# Patient Record
Sex: FEMALE | Race: WHITE | Employment: OTHER | URBAN - METROPOLITAN AREA
[De-identification: names, ages, dates, MRNs, and addresses within clinical notes are randomized per-mention and may not be internally consistent; named-entity substitution may affect disease eponyms.]

---

## 2017-01-05 ENCOUNTER — OFFICE VISIT (OUTPATIENT)
Dept: FAMILY MEDICINE CLINIC | Age: 72
End: 2017-01-05

## 2017-01-05 VITALS
HEIGHT: 60 IN | HEART RATE: 63 BPM | DIASTOLIC BLOOD PRESSURE: 82 MMHG | OXYGEN SATURATION: 90 % | SYSTOLIC BLOOD PRESSURE: 118 MMHG | WEIGHT: 170 LBS | BODY MASS INDEX: 33.38 KG/M2 | TEMPERATURE: 97.8 F

## 2017-01-05 DIAGNOSIS — I10 ESSENTIAL HYPERTENSION: Primary | ICD-10-CM

## 2017-01-05 DIAGNOSIS — M35.00 SJOGREN'S DISEASE (HCC): ICD-10-CM

## 2017-01-05 DIAGNOSIS — J06.9 UPPER RESPIRATORY TRACT INFECTION, UNSPECIFIED TYPE: ICD-10-CM

## 2017-01-05 DIAGNOSIS — B37.31 YEAST VAGINITIS: ICD-10-CM

## 2017-01-05 DIAGNOSIS — E55.9 VITAMIN D DEFICIENCY: ICD-10-CM

## 2017-01-05 DIAGNOSIS — E78.2 MIXED HYPERLIPIDEMIA: ICD-10-CM

## 2017-01-05 DIAGNOSIS — M17.12 PRIMARY OSTEOARTHRITIS OF LEFT KNEE: ICD-10-CM

## 2017-01-05 DIAGNOSIS — K21.9 GASTROESOPHAGEAL REFLUX DISEASE WITHOUT ESOPHAGITIS: ICD-10-CM

## 2017-01-05 PROCEDURE — 99214 OFFICE O/P EST MOD 30 MIN: CPT | Performed by: FAMILY MEDICINE

## 2017-01-05 RX ORDER — PANTOPRAZOLE SODIUM 40 MG/1
TABLET, DELAYED RELEASE ORAL
COMMUNITY
End: 2017-01-05 | Stop reason: SDUPTHER

## 2017-01-05 RX ORDER — FLUCONAZOLE 150 MG/1
TABLET ORAL
Qty: 2 TABLET | Refills: 1 | Status: SHIPPED | OUTPATIENT
Start: 2017-01-05 | End: 2017-10-26

## 2017-01-05 RX ORDER — AZITHROMYCIN 250 MG/1
TABLET, FILM COATED ORAL
Qty: 1 PACKET | Refills: 0 | Status: SHIPPED | OUTPATIENT
Start: 2017-01-05 | End: 2017-01-15

## 2017-01-05 RX ORDER — PANTOPRAZOLE SODIUM 40 MG/1
40 TABLET, DELAYED RELEASE ORAL DAILY
Qty: 90 TABLET | Refills: 1 | Status: SHIPPED | OUTPATIENT
Start: 2017-01-05 | End: 2019-11-15 | Stop reason: ALTCHOICE

## 2017-01-05 RX ORDER — ATORVASTATIN CALCIUM 10 MG/1
10 TABLET, FILM COATED ORAL NIGHTLY
Qty: 90 TABLET | Refills: 1 | Status: SHIPPED | OUTPATIENT
Start: 2017-01-05

## 2017-01-05 RX ORDER — LISINOPRIL AND HYDROCHLOROTHIAZIDE 20; 12.5 MG/1; MG/1
1 TABLET ORAL DAILY
Qty: 90 TABLET | Refills: 1 | Status: SHIPPED | OUTPATIENT
Start: 2017-01-05

## 2017-01-05 RX ORDER — CELECOXIB 200 MG/1
200 CAPSULE ORAL 2 TIMES DAILY
Qty: 180 CAPSULE | Refills: 1 | Status: SHIPPED | OUTPATIENT
Start: 2017-01-05 | End: 2018-11-14 | Stop reason: ALTCHOICE

## 2017-01-05 ASSESSMENT — ENCOUNTER SYMPTOMS
SORE THROAT: 1
GASTROINTESTINAL NEGATIVE: 1
COUGH: 1

## 2017-01-10 ENCOUNTER — HOSPITAL ENCOUNTER (OUTPATIENT)
Dept: LAB | Age: 72
Discharge: HOME | End: 2017-01-10
Attending: FAMILY MEDICINE | Admitting: FAMILY MEDICINE

## 2017-01-10 LAB
ALBUMIN SERPL-MCNC: 4 GM/DL (ref 3.4–5)
ALP BLD-CCNC: 73 IU/L (ref 40–129)
ALT SERPL-CCNC: 10 U/L (ref 10–40)
ANION GAP SERPL CALCULATED.3IONS-SCNC: 6 MMOL/L (ref 4–16)
AST SERPL-CCNC: 17 IU/L (ref 15–37)
BASOPHILS ABSOLUTE: 0 K/CU MM
BASOPHILS RELATIVE PERCENT: 1 % (ref 0–1)
BILIRUB SERPL-MCNC: 0.5 MG/DL (ref 0–1)
BUN BLDV-MCNC: 15 MG/DL (ref 6–23)
CALCIUM SERPL-MCNC: 9.5 MG/DL (ref 8.3–10.6)
CHLORIDE BLD-SCNC: 97 MMOL/L (ref 99–110)
CHOLESTEROL: 106 MG/DL
CO2: 33 MMOL/L (ref 21–32)
CREAT SERPL-MCNC: 0.7 MG/DL (ref 0.6–1.1)
DIFFERENTIAL TYPE: ABNORMAL
EOSINOPHILS ABSOLUTE: 0 K/CU MM
EOSINOPHILS RELATIVE PERCENT: 0 % (ref 0–3)
GFR AFRICAN AMERICAN: >60 ML/MIN/1.73M2
GFR NON-AFRICAN AMERICAN: >60 ML/MIN/1.73M2
GLUCOSE FASTING: 89 MG/DL (ref 70–99)
HCT VFR BLD CALC: 38.3 % (ref 37–47)
HDLC SERPL-MCNC: 47 MG/DL
HEMOGLOBIN: 12.5 GM/DL (ref 12.5–16)
IMMATURE NEUTROPHIL %: 0 % (ref 0–0.43)
LDL CHOLESTEROL DIRECT: 53 MG/DL
LYMPHOCYTES ABSOLUTE: 1.2 K/CU MM
LYMPHOCYTES RELATIVE PERCENT: 30.8 % (ref 24–44)
MCH RBC QN AUTO: 28.3 PG (ref 27–31)
MCHC RBC AUTO-ENTMCNC: 32.6 % (ref 32–36)
MCV RBC AUTO: 86.8 FL (ref 78–100)
MONOCYTES ABSOLUTE: 0.5 K/CU MM
MONOCYTES RELATIVE PERCENT: 11.6 % (ref 0–4)
PDW BLD-RTO: 14.4 % (ref 11.7–14.9)
PLATELET # BLD: 291 K/CU MM (ref 140–440)
PMV BLD AUTO: 8.7 FL (ref 7.5–11.1)
POTASSIUM SERPL-SCNC: 4 MMOL/L (ref 3.5–5.1)
RBC # BLD: 4.41 M/CU MM (ref 4.2–5.4)
SEGMENTED NEUTROPHILS ABSOLUTE COUNT: 2.2 K/CU MM
SEGMENTED NEUTROPHILS RELATIVE PERCENT: 56.6 % (ref 36–66)
SODIUM BLD-SCNC: 136 MMOL/L (ref 135–145)
TOTAL IMMATURE NEUTOROPHIL: 0 K/CU MM
TOTAL PROTEIN: 7.1 GM/DL (ref 6.4–8.2)
TRIGL SERPL-MCNC: 75 MG/DL
VITAMIN D 25-HYDROXY: 44.39 NG/ML
WBC # BLD: 4 K/CU MM (ref 4–10.5)

## 2017-03-31 ENCOUNTER — HOSPITAL ENCOUNTER (OUTPATIENT)
Dept: LAB | Age: 72
Discharge: HOME | End: 2017-03-31
Attending: INTERNAL MEDICINE | Admitting: INTERNAL MEDICINE

## 2017-03-31 LAB
ALBUMIN SERPL-MCNC: 3.8 GM/DL (ref 3.4–5)
ALP BLD-CCNC: 66 IU/L (ref 40–129)
ALT SERPL-CCNC: 11 U/L (ref 10–40)
ANION GAP SERPL CALCULATED.3IONS-SCNC: 7 MMOL/L (ref 4–16)
AST SERPL-CCNC: 19 IU/L (ref 15–37)
BASOPHILS ABSOLUTE: 0 K/CU MM
BASOPHILS RELATIVE PERCENT: 1 % (ref 0–1)
BILIRUB SERPL-MCNC: 0.7 MG/DL (ref 0–1)
BUN BLDV-MCNC: 13 MG/DL (ref 6–23)
CALCIUM SERPL-MCNC: 9.4 MG/DL (ref 8.3–10.6)
CHLORIDE BLD-SCNC: 101 MMOL/L (ref 99–110)
CO2: 31 MMOL/L (ref 21–32)
CREAT SERPL-MCNC: 0.7 MG/DL (ref 0.6–1.1)
DIFFERENTIAL TYPE: ABNORMAL
EOSINOPHILS ABSOLUTE: 0 K/CU MM
EOSINOPHILS RELATIVE PERCENT: 0 % (ref 0–3)
FERRITIN: 24 NG/ML (ref 15–150)
FOLATE: >20 NG/ML (ref 3.1–17.5)
GFR AFRICAN AMERICAN: >60 ML/MIN/1.73M2
GFR NON-AFRICAN AMERICAN: >60 ML/MIN/1.73M2
GLUCOSE BLD-MCNC: 89 MG/DL (ref 70–140)
HCT VFR BLD CALC: 36.2 % (ref 37–47)
HEMOGLOBIN: 11.9 GM/DL (ref 12.5–16)
IGA: 203 MG/DL (ref 69–382)
IGG,SERUM: 921 MG/DL (ref 723–1685)
IGM,SERUM: 100 MG/DL (ref 62–277)
IMMATURE NEUTROPHIL %: 0.3 % (ref 0–0.43)
IRON: 75 UG/DL (ref 37–145)
LACTATE DEHYDROGENASE: 176 IU/L (ref 120–246)
LYMPHOCYTES ABSOLUTE: 1.5 K/CU MM
LYMPHOCYTES RELATIVE PERCENT: 37.3 % (ref 24–44)
MCH RBC QN AUTO: 29.5 PG (ref 27–31)
MCHC RBC AUTO-ENTMCNC: 32.9 % (ref 32–36)
MCV RBC AUTO: 89.8 FL (ref 78–100)
MONOCYTES ABSOLUTE: 0.4 K/CU MM
MONOCYTES RELATIVE PERCENT: 10.2 % (ref 0–4)
PCT TRANSFERRIN: 26 % (ref 10–44)
PDW BLD-RTO: 13.1 % (ref 11.7–14.9)
PLATELET # BLD: 227 K/CU MM (ref 140–440)
PMV BLD AUTO: 10.1 FL (ref 7.5–11.1)
POTASSIUM SERPL-SCNC: 3.8 MMOL/L (ref 3.5–5.1)
RBC # BLD: 4.03 M/CU MM (ref 4.2–5.4)
RETICULOCYTE COUNT PCT: 1.6 % (ref 0.2–2.2)
SEGMENTED NEUTROPHILS ABSOLUTE COUNT: 2 K/CU MM
SEGMENTED NEUTROPHILS RELATIVE PERCENT: 51.2 % (ref 36–66)
SODIUM BLD-SCNC: 139 MMOL/L (ref 135–145)
TOTAL IMMATURE NEUTOROPHIL: 0.01 K/CU MM
TOTAL IRON BINDING CAPACITY: 290 UG/DL (ref 250–450)
TOTAL PROTEIN: 6.8 GM/DL (ref 6.4–8.2)
UNSATURATED IRON BINDING CAPACITY: 215 UG/DL (ref 110–370)
VITAMIN B-12: 378.6 PG/ML (ref 211–911)
WBC # BLD: 3.9 K/CU MM (ref 4–10.5)

## 2017-04-03 LAB
ALBUMIN ELP: 3.6 GM/DL (ref 3.2–5.6)
ALPHA-1-GLOBULIN: 0.2 GM/DL (ref 0.1–0.4)
ALPHA-2-GLOBULIN: 0.8 GM/DL (ref 0.4–1.2)
BETA GLOBULIN: 1 GM/DL (ref 0.5–1.3)
GAMMA GLOBULIN: 1.2 GM/DL (ref 0.5–1.6)
IMMUNOFIXATION ELECTROPHORESIS 1: NORMAL
TOTAL PROTEIN: 6.8 GM/DL (ref 6.4–8.2)

## 2017-05-11 ENCOUNTER — HOSPITAL ENCOUNTER (OUTPATIENT)
Dept: OTHER | Age: 72
Discharge: HOME | End: 2017-05-11
Attending: INTERNAL MEDICINE | Admitting: INTERNAL MEDICINE

## 2017-05-11 LAB
HEMOCCULT SP1 STL QL: NEGATIVE
OCCULT BLOOD 2: NEGATIVE
OCCULT BLOOD 3: NEGATIVE

## 2017-06-13 ENCOUNTER — TELEPHONE (OUTPATIENT)
Dept: PHYSICAL MEDICINE AND REHAB | Age: 72
End: 2017-06-13

## 2017-06-14 ENCOUNTER — HOSPITAL ENCOUNTER (OUTPATIENT)
Dept: LAB | Age: 72
Discharge: HOME | End: 2017-06-14
Attending: INTERNAL MEDICINE | Admitting: INTERNAL MEDICINE

## 2017-06-14 ENCOUNTER — OFFICE VISIT (OUTPATIENT)
Dept: PHYSICAL MEDICINE AND REHAB | Age: 72
End: 2017-06-14

## 2017-06-14 DIAGNOSIS — M79.601 PARESTHESIA AND PAIN OF BOTH UPPER EXTREMITIES: ICD-10-CM

## 2017-06-14 DIAGNOSIS — M79.602 PARESTHESIA AND PAIN OF BOTH UPPER EXTREMITIES: ICD-10-CM

## 2017-06-14 DIAGNOSIS — G56.03 BILATERAL CARPAL TUNNEL SYNDROME: Primary | ICD-10-CM

## 2017-06-14 DIAGNOSIS — G56.22 ULNAR NEUROPATHY AT WRIST, LEFT: ICD-10-CM

## 2017-06-14 DIAGNOSIS — R20.2 PARESTHESIA AND PAIN OF BOTH UPPER EXTREMITIES: ICD-10-CM

## 2017-06-14 LAB
ALBUMIN SERPL-MCNC: 3.9 GM/DL (ref 3.4–5)
ALP BLD-CCNC: 65 IU/L (ref 40–129)
ALT SERPL-CCNC: 10 U/L (ref 10–40)
ANION GAP SERPL CALCULATED.3IONS-SCNC: 8 MMOL/L (ref 4–16)
AST SERPL-CCNC: 17 IU/L (ref 15–37)
BASOPHILS ABSOLUTE: 0 K/CU MM
BASOPHILS RELATIVE PERCENT: 1 % (ref 0–1)
BILIRUB SERPL-MCNC: 0.7 MG/DL (ref 0–1)
BUN BLDV-MCNC: 15 MG/DL (ref 6–23)
C-REACTIVE PROTEIN, HIGH SENSITIVITY: 4.7 MG/L
CALCIUM SERPL-MCNC: 9.2 MG/DL (ref 8.3–10.6)
CHLORIDE BLD-SCNC: 101 MMOL/L (ref 99–110)
CO2: 30 MMOL/L (ref 21–32)
CREAT SERPL-MCNC: 0.8 MG/DL (ref 0.6–1.1)
DIFFERENTIAL TYPE: ABNORMAL
EOSINOPHILS ABSOLUTE: 0 K/CU MM
EOSINOPHILS RELATIVE PERCENT: 0 % (ref 0–3)
ERYTHROCYTE SEDIMENTATION RATE: 23 MM/HR (ref 0–30)
FERRITIN: 28 NG/ML (ref 15–150)
FOLATE: >20 NG/ML (ref 3.1–17.5)
GAMMA GLUTAMYL TRANSFERASE: 22 IU/L (ref 5–36)
GFR AFRICAN AMERICAN: >60 ML/MIN/1.73M2
GFR NON-AFRICAN AMERICAN: >60 ML/MIN/1.73M2
GLUCOSE BLD-MCNC: 97 MG/DL (ref 70–140)
HCT VFR BLD CALC: 37.9 % (ref 37–47)
HEMOGLOBIN: 12.3 GM/DL (ref 12.5–16)
IMMATURE NEUTROPHIL %: 0.2 % (ref 0–0.43)
IRON: 84 UG/DL (ref 37–145)
LACTATE DEHYDROGENASE: 181 IU/L (ref 120–246)
LYMPHOCYTES ABSOLUTE: 1.5 K/CU MM
LYMPHOCYTES RELATIVE PERCENT: 35.3 % (ref 24–44)
MCH RBC QN AUTO: 29 PG (ref 27–31)
MCHC RBC AUTO-ENTMCNC: 32.5 % (ref 32–36)
MCV RBC AUTO: 89.4 FL (ref 78–100)
MONOCYTES ABSOLUTE: 0.4 K/CU MM
MONOCYTES RELATIVE PERCENT: 10.3 % (ref 0–4)
PCT TRANSFERRIN: 30 % (ref 10–44)
PDW BLD-RTO: 13.3 % (ref 11.7–14.9)
PLATELET # BLD: 210 K/CU MM (ref 140–440)
PMV BLD AUTO: 9.2 FL (ref 7.5–11.1)
POTASSIUM SERPL-SCNC: 4 MMOL/L (ref 3.5–5.1)
RBC # BLD: 4.24 M/CU MM (ref 4.2–5.4)
RETICULOCYTE COUNT PCT: 1.3 % (ref 0.2–2.2)
RHEUMATOID FACTOR: NEGATIVE
SEGMENTED NEUTROPHILS ABSOLUTE COUNT: 2.2 K/CU MM
SEGMENTED NEUTROPHILS RELATIVE PERCENT: 53.2 % (ref 36–66)
SODIUM BLD-SCNC: 139 MMOL/L (ref 135–145)
TOTAL IMMATURE NEUTOROPHIL: 0.01 K/CU MM
TOTAL IRON BINDING CAPACITY: 283 UG/DL (ref 250–450)
TOTAL PROTEIN: 6.7 GM/DL (ref 6.4–8.2)
UNSATURATED IRON BINDING CAPACITY: 199 UG/DL (ref 110–370)
VITAMIN B-12: 372.4 PG/ML (ref 211–911)
WBC # BLD: 4.2 K/CU MM (ref 4–10.5)

## 2017-06-14 PROCEDURE — 95910 NRV CNDJ TEST 7-8 STUDIES: CPT | Performed by: PHYSICAL MEDICINE & REHABILITATION

## 2017-06-14 PROCEDURE — 95886 MUSC TEST DONE W/N TEST COMP: CPT | Performed by: PHYSICAL MEDICINE & REHABILITATION

## 2017-06-14 PROCEDURE — 1036F TOBACCO NON-USER: CPT | Performed by: PHYSICAL MEDICINE & REHABILITATION

## 2017-06-16 LAB
ANA TITER: NORMAL
ANTI-NUCLEAR ANTIBODY (ANA): DETECTED
RHEUMATOID FACTOR: NEGATIVE

## 2017-09-18 ENCOUNTER — HOSPITAL ENCOUNTER (OUTPATIENT)
Dept: LAB | Age: 72
Discharge: HOME | End: 2017-09-18
Attending: FAMILY MEDICINE | Admitting: FAMILY MEDICINE

## 2017-09-18 LAB
ALBUMIN SERPL-MCNC: 3.8 GM/DL (ref 3.4–5)
ALP BLD-CCNC: 59 IU/L (ref 40–129)
ALT SERPL-CCNC: 9 U/L (ref 10–40)
ANION GAP SERPL CALCULATED.3IONS-SCNC: 7 MMOL/L (ref 4–16)
AST SERPL-CCNC: 17 IU/L (ref 15–37)
BASOPHILS ABSOLUTE: 0 K/CU MM
BASOPHILS RELATIVE PERCENT: 1 % (ref 0–1)
BILIRUB SERPL-MCNC: 0.7 MG/DL (ref 0–1)
BUN BLDV-MCNC: 12 MG/DL (ref 6–23)
CALCIUM SERPL-MCNC: 9.1 MG/DL (ref 8.3–10.6)
CHLORIDE BLD-SCNC: 100 MMOL/L (ref 99–110)
CO2: 33 MMOL/L (ref 21–32)
CREAT SERPL-MCNC: 0.7 MG/DL (ref 0.6–1.1)
DIFFERENTIAL TYPE: ABNORMAL
EOSINOPHILS ABSOLUTE: 0 K/CU MM
EOSINOPHILS RELATIVE PERCENT: 0 % (ref 0–3)
GFR AFRICAN AMERICAN: >60 ML/MIN/1.73M2
GFR NON-AFRICAN AMERICAN: >60 ML/MIN/1.73M2
GLUCOSE FASTING: 93 MG/DL (ref 70–99)
HCT VFR BLD CALC: 37.7 % (ref 37–47)
HEMOGLOBIN: 12.6 GM/DL (ref 12.5–16)
IMMATURE NEUTROPHIL %: 0 % (ref 0–0.43)
LYMPHOCYTES ABSOLUTE: 1.4 K/CU MM
LYMPHOCYTES RELATIVE PERCENT: 35.8 % (ref 24–44)
MCH RBC QN AUTO: 30.4 PG (ref 27–31)
MCHC RBC AUTO-ENTMCNC: 33.4 % (ref 32–36)
MCV RBC AUTO: 90.8 FL (ref 78–100)
MONOCYTES ABSOLUTE: 0.4 K/CU MM
MONOCYTES RELATIVE PERCENT: 10.9 % (ref 0–4)
PDW BLD-RTO: 13 % (ref 11.7–14.9)
PLATELET # BLD: 209 K/CU MM (ref 140–440)
PMV BLD AUTO: 9.1 FL (ref 7.5–11.1)
POTASSIUM SERPL-SCNC: 4.1 MMOL/L (ref 3.5–5.1)
RBC # BLD: 4.15 M/CU MM (ref 4.2–5.4)
SEGMENTED NEUTROPHILS ABSOLUTE COUNT: 2 K/CU MM
SEGMENTED NEUTROPHILS RELATIVE PERCENT: 52.3 % (ref 36–66)
SODIUM BLD-SCNC: 140 MMOL/L (ref 135–145)
TOTAL IMMATURE NEUTOROPHIL: 0 K/CU MM
TOTAL PROTEIN: 6.8 GM/DL (ref 6.4–8.2)
WBC # BLD: 3.9 K/CU MM (ref 4–10.5)

## 2017-10-09 ENCOUNTER — HOSPITAL ENCOUNTER (OUTPATIENT)
Dept: GENERAL RADIOLOGY | Age: 72
Discharge: HOME | End: 2017-10-09
Attending: ORTHOPAEDIC SURGERY | Admitting: ORTHOPAEDIC SURGERY

## 2017-10-09 DIAGNOSIS — R52 PAIN: ICD-10-CM

## 2017-10-26 ENCOUNTER — HOSPITAL ENCOUNTER (OUTPATIENT)
Dept: INFUSION THERAPY | Age: 72
Discharge: HOME | End: 2017-10-26
Attending: FAMILY MEDICINE | Admitting: FAMILY MEDICINE

## 2017-10-26 VITALS
SYSTOLIC BLOOD PRESSURE: 123 MMHG | TEMPERATURE: 98 F | WEIGHT: 170 LBS | DIASTOLIC BLOOD PRESSURE: 66 MMHG | BODY MASS INDEX: 33.38 KG/M2 | HEIGHT: 60 IN | RESPIRATION RATE: 16 BRPM | HEART RATE: 72 BPM

## 2017-10-26 RX ORDER — FERROUS SULFATE 325(65) MG
325 TABLET ORAL
COMMUNITY
End: 2020-01-01

## 2017-10-26 RX ORDER — ZOLEDRONIC ACID 5 MG/100ML
5 INJECTION, SOLUTION INTRAVENOUS ONCE
Status: COMPLETED | OUTPATIENT
Start: 2017-10-26 | End: 2017-10-26

## 2017-10-26 RX ORDER — SODIUM CHLORIDE 0.9 % (FLUSH) 0.9 %
10 SYRINGE (ML) INJECTION PRN
Status: DISCONTINUED | OUTPATIENT
Start: 2017-10-26 | End: 2017-10-27 | Stop reason: HOSPADM

## 2017-10-26 RX ADMIN — ZOLEDRONIC ACID 5 MG: 5 INJECTION, SOLUTION INTRAVENOUS at 11:45

## 2017-10-26 RX ADMIN — Medication 10 ML: at 11:45

## 2017-10-26 RX ADMIN — Medication 10 ML: at 12:15

## 2017-10-26 ASSESSMENT — PAIN SCALES - GENERAL: PAINLEVEL_OUTOF10: 0

## 2017-10-26 NOTE — LETTER
[facility] INFUSION UNIT      Dear Liborio Lovett,     Your patient Tennille Courser,  1945, received Reclast on 10/26/2017 at the Infusion Unit.     Thank You,        Ernst García  TEAM  [phone]

## 2017-10-26 NOTE — PLAN OF CARE
Arrived at Outpatient Infusion Unit for Reclast. This is her 4th time. Oriented to room # 3 with understanding. Plan of care discussed and understood.

## 2017-10-26 NOTE — DISCHARGE SUMMARY
Tolerated Reclast well. Home instructions given with understanding. Discharged walking per self to the front entrance to leave per private auto.

## 2018-01-26 ENCOUNTER — HOSPITAL ENCOUNTER (OUTPATIENT)
Dept: LAB | Age: 73
Discharge: HOME | End: 2018-01-26
Attending: INTERNAL MEDICINE | Admitting: INTERNAL MEDICINE

## 2018-01-26 LAB
ALBUMIN SERPL-MCNC: 4 GM/DL (ref 3.4–5)
ALP BLD-CCNC: 64 IU/L (ref 40–129)
ALT SERPL-CCNC: 15 U/L (ref 10–40)
ANION GAP SERPL CALCULATED.3IONS-SCNC: 15 MMOL/L (ref 4–16)
AST SERPL-CCNC: 18 IU/L (ref 15–37)
BASOPHILS ABSOLUTE: 0.1 K/CU MM
BASOPHILS RELATIVE PERCENT: 0.6 % (ref 0–1)
BILIRUB SERPL-MCNC: 0.9 MG/DL (ref 0–1)
BUN BLDV-MCNC: 17 MG/DL (ref 6–23)
CALCIUM SERPL-MCNC: 9 MG/DL (ref 8.3–10.6)
CHLORIDE BLD-SCNC: 99 MMOL/L (ref 99–110)
CO2: 25 MMOL/L (ref 21–32)
CREAT SERPL-MCNC: 0.7 MG/DL (ref 0.6–1.1)
DIFFERENTIAL TYPE: ABNORMAL
EOSINOPHILS ABSOLUTE: 0 K/CU MM
EOSINOPHILS RELATIVE PERCENT: 0 % (ref 0–3)
FERRITIN: 54 NG/ML (ref 15–150)
FOLATE: >20 NG/ML (ref 3.1–17.5)
GFR AFRICAN AMERICAN: >60 ML/MIN/1.73M2
GFR NON-AFRICAN AMERICAN: >60 ML/MIN/1.73M2
GLUCOSE FASTING: 123 MG/DL (ref 70–99)
HCT VFR BLD CALC: 36.2 % (ref 37–47)
HEMOGLOBIN: 12.3 GM/DL (ref 12.5–16)
IMMATURE NEUTROPHIL %: 0.3 % (ref 0–0.43)
IRON: 19 UG/DL (ref 37–145)
LYMPHOCYTES ABSOLUTE: 1.2 K/CU MM
LYMPHOCYTES RELATIVE PERCENT: 15 % (ref 24–44)
MCH RBC QN AUTO: 30.7 PG (ref 27–31)
MCHC RBC AUTO-ENTMCNC: 34 % (ref 32–36)
MCV RBC AUTO: 90.3 FL (ref 78–100)
MONOCYTES ABSOLUTE: 0.5 K/CU MM
MONOCYTES RELATIVE PERCENT: 6.7 % (ref 0–4)
PCT TRANSFERRIN: 8 % (ref 10–44)
PDW BLD-RTO: 12.8 % (ref 11.7–14.9)
PLATELET # BLD: 219 K/CU MM (ref 140–440)
PMV BLD AUTO: 9.5 FL (ref 7.5–11.1)
POTASSIUM SERPL-SCNC: 4.1 MMOL/L (ref 3.5–5.1)
RBC # BLD: 4.01 M/CU MM (ref 4.2–5.4)
SEGMENTED NEUTROPHILS ABSOLUTE COUNT: 6 K/CU MM
SEGMENTED NEUTROPHILS RELATIVE PERCENT: 77.4 % (ref 36–66)
SODIUM BLD-SCNC: 139 MMOL/L (ref 135–145)
TOTAL IMMATURE NEUTOROPHIL: 0.02 K/CU MM
TOTAL IRON BINDING CAPACITY: 242 UG/DL (ref 250–450)
TOTAL PROTEIN: 6.8 GM/DL (ref 6.4–8.2)
UNSATURATED IRON BINDING CAPACITY: 223 UG/DL (ref 110–370)
VITAMIN B-12: 377.5 PG/ML (ref 211–911)
WBC # BLD: 7.8 K/CU MM (ref 4–10.5)

## 2018-03-16 ENCOUNTER — HOSPITAL ENCOUNTER (OUTPATIENT)
Dept: NUCLEAR MEDICINE | Age: 73
Discharge: HOME | End: 2018-03-16
Attending: ORTHOPAEDIC SURGERY | Admitting: ORTHOPAEDIC SURGERY

## 2018-03-16 DIAGNOSIS — M17.12 ARTHRITIS OF KNEE, LEFT: ICD-10-CM

## 2018-03-16 DIAGNOSIS — M17.12 PRIMARY OSTEOARTHRITIS OF LEFT KNEE: ICD-10-CM

## 2018-03-16 RX ORDER — TC 99M MEDRONATE 20 MG/10ML
25 INJECTION, POWDER, LYOPHILIZED, FOR SOLUTION INTRAVENOUS
Status: COMPLETED | OUTPATIENT
Start: 2018-03-16 | End: 2018-03-16

## 2018-03-16 RX ADMIN — TC 99M MEDRONATE 25 MILLICURIE: 20 INJECTION, POWDER, LYOPHILIZED, FOR SOLUTION INTRAVENOUS at 09:35

## 2018-03-30 ENCOUNTER — HOSPITAL ENCOUNTER (OUTPATIENT)
Dept: LAB | Age: 73
Discharge: HOME | End: 2018-03-30
Attending: FAMILY MEDICINE | Admitting: FAMILY MEDICINE

## 2018-03-30 LAB
ALBUMIN SERPL-MCNC: 4 GM/DL (ref 3.4–5)
ALP BLD-CCNC: 56 IU/L (ref 40–129)
ALT SERPL-CCNC: 14 U/L (ref 10–40)
ANION GAP SERPL CALCULATED.3IONS-SCNC: 11 MMOL/L (ref 4–16)
AST SERPL-CCNC: 17 IU/L (ref 15–37)
BASOPHILS ABSOLUTE: 0 K/CU MM
BASOPHILS RELATIVE PERCENT: 0.8 % (ref 0–1)
BILIRUB SERPL-MCNC: 0.8 MG/DL (ref 0–1)
BUN BLDV-MCNC: 20 MG/DL (ref 6–23)
CALCIUM SERPL-MCNC: 9.2 MG/DL (ref 8.3–10.6)
CHLORIDE BLD-SCNC: 100 MMOL/L (ref 99–110)
CHOLESTEROL, FASTING: 114 MG/DL
CO2: 30 MMOL/L (ref 21–32)
CREAT SERPL-MCNC: 0.8 MG/DL (ref 0.6–1.1)
DIFFERENTIAL TYPE: ABNORMAL
EOSINOPHILS ABSOLUTE: 0 K/CU MM
EOSINOPHILS RELATIVE PERCENT: 0 % (ref 0–3)
GFR AFRICAN AMERICAN: >60 ML/MIN/1.73M2
GFR NON-AFRICAN AMERICAN: >60 ML/MIN/1.73M2
GLUCOSE FASTING: 94 MG/DL (ref 70–99)
HCT VFR BLD CALC: 39.9 % (ref 37–47)
HDLC SERPL-MCNC: 64 MG/DL
HEMOGLOBIN: 13 GM/DL (ref 12.5–16)
IMMATURE NEUTROPHIL %: 0.3 % (ref 0–0.43)
IRON: 95 UG/DL (ref 37–145)
LDL CHOLESTEROL DIRECT: 52 MG/DL
LYMPHOCYTES ABSOLUTE: 1.2 K/CU MM
LYMPHOCYTES RELATIVE PERCENT: 31.8 % (ref 24–44)
MCH RBC QN AUTO: 30 PG (ref 27–31)
MCHC RBC AUTO-ENTMCNC: 32.6 % (ref 32–36)
MCV RBC AUTO: 92.1 FL (ref 78–100)
MONOCYTES ABSOLUTE: 0.4 K/CU MM
MONOCYTES RELATIVE PERCENT: 9.7 % (ref 0–4)
PDW BLD-RTO: 13.2 % (ref 11.7–14.9)
PLATELET # BLD: 214 K/CU MM (ref 140–440)
PMV BLD AUTO: 9.8 FL (ref 7.5–11.1)
POTASSIUM SERPL-SCNC: 4.4 MMOL/L (ref 3.5–5.1)
RBC # BLD: 4.33 M/CU MM (ref 4.2–5.4)
SEGMENTED NEUTROPHILS ABSOLUTE COUNT: 2.2 K/CU MM
SEGMENTED NEUTROPHILS RELATIVE PERCENT: 57.4 % (ref 36–66)
SODIUM BLD-SCNC: 141 MMOL/L (ref 135–145)
TOTAL IMMATURE NEUTOROPHIL: 0.01 K/CU MM
TOTAL PROTEIN: 6.5 GM/DL (ref 6.4–8.2)
TRIGLYCERIDE, FASTING: 49 MG/DL
WBC # BLD: 3.8 K/CU MM (ref 4–10.5)

## 2018-04-01 LAB
VITAMIN D 1,25-DIHYDROXY: 30.9
VITAMIN D 25-HYDROXY: 43.78 NG/ML

## 2018-07-10 ENCOUNTER — HOSPITAL ENCOUNTER (OUTPATIENT)
Dept: LAB | Age: 73
Discharge: HOME | End: 2018-07-10
Attending: FAMILY MEDICINE | Admitting: FAMILY MEDICINE

## 2018-07-10 LAB
ALBUMIN SERPL-MCNC: 4 GM/DL (ref 3.4–5)
ALP BLD-CCNC: 62 IU/L (ref 40–129)
ALT SERPL-CCNC: 16 U/L (ref 10–40)
ANION GAP SERPL CALCULATED.3IONS-SCNC: 11 MMOL/L (ref 4–16)
AST SERPL-CCNC: 20 IU/L (ref 15–37)
BASOPHILS ABSOLUTE: 0.1 K/CU MM
BASOPHILS RELATIVE PERCENT: 2.3 % (ref 0–1)
BILIRUB SERPL-MCNC: 0.5 MG/DL (ref 0–1)
BUN BLDV-MCNC: 16 MG/DL (ref 6–23)
CALCIUM SERPL-MCNC: 9.9 MG/DL (ref 8.3–10.6)
CHLORIDE BLD-SCNC: 100 MMOL/L (ref 99–110)
CHOLESTEROL, FASTING: 112 MG/DL
CO2: 30 MMOL/L (ref 21–32)
CREAT SERPL-MCNC: 0.7 MG/DL (ref 0.6–1.1)
DIFFERENTIAL TYPE: ABNORMAL
EOSINOPHILS ABSOLUTE: 0 K/CU MM
EOSINOPHILS RELATIVE PERCENT: 0 % (ref 0–3)
GFR AFRICAN AMERICAN: >60 ML/MIN/1.73M2
GFR NON-AFRICAN AMERICAN: >60 ML/MIN/1.73M2
GLUCOSE FASTING: 95 MG/DL (ref 70–99)
HCT VFR BLD CALC: 40 % (ref 37–47)
HDLC SERPL-MCNC: 58 MG/DL
HEMOGLOBIN: 13.2 GM/DL (ref 12.5–16)
IMMATURE NEUTROPHIL %: 0.3 % (ref 0–0.43)
IRON: 91 UG/DL (ref 37–145)
LDL CHOLESTEROL DIRECT: 54 MG/DL
LYMPHOCYTES ABSOLUTE: 1.1 K/CU MM
LYMPHOCYTES RELATIVE PERCENT: 34.1 % (ref 24–44)
MCH RBC QN AUTO: 30.8 PG (ref 27–31)
MCHC RBC AUTO-ENTMCNC: 33 % (ref 32–36)
MCV RBC AUTO: 93.2 FL (ref 78–100)
MONOCYTES ABSOLUTE: 0.4 K/CU MM
MONOCYTES RELATIVE PERCENT: 11.3 % (ref 0–4)
PDW BLD-RTO: 12.2 % (ref 11.7–14.9)
PLATELET # BLD: 218 K/CU MM (ref 140–440)
PMV BLD AUTO: 9.3 FL (ref 7.5–11.1)
POTASSIUM SERPL-SCNC: 4.4 MMOL/L (ref 3.5–5.1)
RBC # BLD: 4.29 M/CU MM (ref 4.2–5.4)
SEGMENTED NEUTROPHILS ABSOLUTE COUNT: 1.6 K/CU MM
SEGMENTED NEUTROPHILS RELATIVE PERCENT: 52 % (ref 36–66)
SODIUM BLD-SCNC: 141 MMOL/L (ref 135–145)
TOTAL IMMATURE NEUTOROPHIL: 0.01 K/CU MM
TOTAL PROTEIN: 6.5 GM/DL (ref 6.4–8.2)
TRIGLYCERIDE, FASTING: 67 MG/DL
VITAMIN D 25-HYDROXY: 46.97 NG/ML
WBC # BLD: 3.1 K/CU MM (ref 4–10.5)

## 2018-08-01 ENCOUNTER — HOSPITAL ENCOUNTER (OUTPATIENT)
Dept: LAB | Age: 73
Discharge: HOME | End: 2018-08-01

## 2018-08-01 LAB
ALBUMIN SERPL-MCNC: 4.1 GM/DL (ref 3.4–5)
ALP BLD-CCNC: 65 IU/L (ref 40–129)
ALT SERPL-CCNC: 15 U/L (ref 10–40)
ANION GAP SERPL CALCULATED.3IONS-SCNC: 12 MMOL/L (ref 4–16)
AST SERPL-CCNC: 18 IU/L (ref 15–37)
BACTERIA: ABNORMAL /HPF
BASOPHILS ABSOLUTE: 0 K/CU MM
BASOPHILS RELATIVE PERCENT: 0.9 % (ref 0–1)
BILIRUB SERPL-MCNC: 0.6 MG/DL (ref 0–1)
BILIRUBIN URINE: NEGATIVE MG/DL
BLOOD, URINE: NEGATIVE
BUN BLDV-MCNC: 17 MG/DL (ref 6–23)
CALCIUM SERPL-MCNC: 9.5 MG/DL (ref 8.3–10.6)
CAST TYPE: ABNORMAL /HPF
CHLORIDE BLD-SCNC: 98 MMOL/L (ref 99–110)
CLARITY: CLEAR
CO2: 30 MMOL/L (ref 21–32)
COLOR: YELLOW
CREAT SERPL-MCNC: 0.7 MG/DL (ref 0.6–1.1)
CRYSTAL TYPE: ABNORMAL /HPF
DIFFERENTIAL TYPE: ABNORMAL
EOSINOPHILS ABSOLUTE: 0 K/CU MM
EOSINOPHILS RELATIVE PERCENT: 0 % (ref 0–3)
EPITHELIAL CELLS, UA: ABNORMAL /HPF
FERRITIN: 391 NG/ML (ref 15–150)
FOLATE: >20 NG/ML (ref 3.1–17.5)
GFR AFRICAN AMERICAN: >60 ML/MIN/1.73M2
GFR NON-AFRICAN AMERICAN: >60 ML/MIN/1.73M2
GLUCOSE FASTING: 100 MG/DL (ref 70–99)
GLUCOSE, URINE: NEGATIVE MG/DL
HCT VFR BLD CALC: 41.4 % (ref 37–47)
HEMOCCULT SP1 STL QL: NEGATIVE
HEMOGLOBIN: 13.5 GM/DL (ref 12.5–16)
IMMATURE NEUTROPHIL %: 0.2 % (ref 0–0.43)
IRON: 77 UG/DL (ref 37–145)
KETONES, URINE: NEGATIVE MG/DL
LEUKOCYTE ESTERASE, URINE: NEGATIVE
LYMPHOCYTES ABSOLUTE: 1.1 K/CU MM
LYMPHOCYTES RELATIVE PERCENT: 24.3 % (ref 24–44)
MCH RBC QN AUTO: 30.8 PG (ref 27–31)
MCHC RBC AUTO-ENTMCNC: 32.6 % (ref 32–36)
MCV RBC AUTO: 94.3 FL (ref 78–100)
MONOCYTES ABSOLUTE: 0.6 K/CU MM
MONOCYTES RELATIVE PERCENT: 11.9 % (ref 0–4)
MUCUS: NEGATIVE HPF
NITRITE URINE, QUANTITATIVE: NEGATIVE
OCCULT BLOOD 2: NEGATIVE
OCCULT BLOOD 3: NEGATIVE
PCT TRANSFERRIN: 34 % (ref 10–44)
PDW BLD-RTO: 12.5 % (ref 11.7–14.9)
PH, URINE: 7 (ref 5–8)
PLATELET # BLD: 206 K/CU MM (ref 140–440)
PMV BLD AUTO: 9.5 FL (ref 7.5–11.1)
POTASSIUM SERPL-SCNC: 3.7 MMOL/L (ref 3.5–5.1)
PROTEIN UA: NEGATIVE MG/DL
RBC # BLD: 4.39 M/CU MM (ref 4.2–5.4)
RBC URINE: ABNORMAL /HPF (ref 0–6)
SEGMENTED NEUTROPHILS ABSOLUTE COUNT: 2.9 K/CU MM
SEGMENTED NEUTROPHILS RELATIVE PERCENT: 62.7 % (ref 36–66)
SODIUM BLD-SCNC: 140 MMOL/L (ref 135–145)
SPECIFIC GRAVITY UA: 1.01 (ref 1–1.03)
TOTAL IMMATURE NEUTOROPHIL: 0.01 K/CU MM
TOTAL IRON BINDING CAPACITY: 226 UG/DL (ref 250–450)
TOTAL PROTEIN: 6.8 GM/DL (ref 6.4–8.2)
UNSATURATED IRON BINDING CAPACITY: 149 UG/DL (ref 110–370)
UROBILINOGEN, URINE: 0.2 MG/DL (ref 0.2–1)
VITAMIN B-12: 1253 PG/ML (ref 211–911)
VOLUME, (UVOL): 12 ML (ref 10–12)
WBC # BLD: 4.6 K/CU MM (ref 4–10.5)
WBC UA: ABNORMAL /HPF (ref 0–5)

## 2018-11-14 ENCOUNTER — HOSPITAL ENCOUNTER (OUTPATIENT)
Dept: INFUSION THERAPY | Age: 73
Setting detail: INFUSION SERIES
Discharge: HOME | End: 2018-11-14
Payer: MEDICARE

## 2018-11-14 VITALS
HEIGHT: 59 IN | SYSTOLIC BLOOD PRESSURE: 147 MMHG | DIASTOLIC BLOOD PRESSURE: 72 MMHG | HEART RATE: 70 BPM | WEIGHT: 165 LBS | BODY MASS INDEX: 33.26 KG/M2 | TEMPERATURE: 98.4 F | RESPIRATION RATE: 16 BRPM

## 2018-11-14 PROCEDURE — 99211 OFF/OP EST MAY X REQ PHY/QHP: CPT

## 2018-11-14 PROCEDURE — 2580000003 HC RX 258: Performed by: FAMILY MEDICINE

## 2018-11-14 PROCEDURE — 96365 THER/PROPH/DIAG IV INF INIT: CPT

## 2018-11-14 PROCEDURE — 6360000002 HC RX W HCPCS: Performed by: FAMILY MEDICINE

## 2018-11-14 RX ORDER — 0.9 % SODIUM CHLORIDE 0.9 %
10 VIAL (ML) INJECTION PRN
Status: DISCONTINUED | OUTPATIENT
Start: 2018-11-14 | End: 2018-11-15 | Stop reason: HOSPADM

## 2018-11-14 RX ORDER — ZOLEDRONIC ACID 5 MG/100ML
5 INJECTION, SOLUTION INTRAVENOUS ONCE
Status: COMPLETED | OUTPATIENT
Start: 2018-11-14 | End: 2018-11-14

## 2018-11-14 RX ADMIN — SODIUM CHLORIDE, PRESERVATIVE FREE 10 ML: 5 INJECTION INTRAVENOUS at 11:35

## 2018-11-14 RX ADMIN — ZOLEDRONIC ACID 5 MG: 5 INJECTION, SOLUTION INTRAVENOUS at 11:05

## 2018-11-14 RX ADMIN — SODIUM CHLORIDE, PRESERVATIVE FREE 10 ML: 5 INJECTION INTRAVENOUS at 11:05

## 2018-11-14 NOTE — PLAN OF CARE
Ambulatory to unit room 1 for Reclast.Orientated to unit. Procedure and plan of care explained. Questions answered. Understanding verbalized.

## 2018-11-14 NOTE — PLAN OF CARE
Tolerated Reclast well. Discharge instructions explained written copy given. Understanding verbalized. Discharged home. Down to private auto per self.

## 2019-01-11 ENCOUNTER — HOSPITAL ENCOUNTER (OUTPATIENT)
Age: 74
Discharge: HOME | End: 2019-01-11
Payer: MEDICARE

## 2019-01-11 LAB
ALBUMIN SERPL-MCNC: 4.2 GM/DL (ref 3.4–5)
ALP BLD-CCNC: 57 IU/L (ref 40–129)
ALT SERPL-CCNC: 28 U/L (ref 10–40)
ANION GAP SERPL CALCULATED.3IONS-SCNC: 9 MMOL/L (ref 4–16)
AST SERPL-CCNC: 24 IU/L (ref 15–37)
BASOPHILS ABSOLUTE: 0 K/CU MM
BASOPHILS RELATIVE PERCENT: 0.8 % (ref 0–1)
BILIRUB SERPL-MCNC: 0.7 MG/DL (ref 0–1)
BUN BLDV-MCNC: 12 MG/DL (ref 6–23)
CALCIUM SERPL-MCNC: 9.9 MG/DL (ref 8.3–10.6)
CHLORIDE BLD-SCNC: 100 MMOL/L (ref 99–110)
CO2: 31 MMOL/L (ref 21–32)
CREAT SERPL-MCNC: 0.7 MG/DL (ref 0.6–1.1)
DIFFERENTIAL TYPE: ABNORMAL
EOSINOPHILS ABSOLUTE: 0 K/CU MM
EOSINOPHILS RELATIVE PERCENT: 0 % (ref 0–3)
ERYTHROCYTE SEDIMENTATION RATE: 11 MM/HR (ref 0–30)
GFR AFRICAN AMERICAN: >60 ML/MIN/1.73M2
GFR NON-AFRICAN AMERICAN: >60 ML/MIN/1.73M2
GLUCOSE FASTING: 97 MG/DL (ref 70–99)
HCT VFR BLD CALC: 39.4 % (ref 37–47)
HEMOGLOBIN: 13 GM/DL (ref 12.5–16)
HIGH SENSITIVE C-REACTIVE PROTEIN: 2.5 MG/L
IMMATURE NEUTROPHIL %: 0 % (ref 0–0.43)
LYMPHOCYTES ABSOLUTE: 1.2 K/CU MM
LYMPHOCYTES RELATIVE PERCENT: 34 % (ref 24–44)
MCH RBC QN AUTO: 30.7 PG (ref 27–31)
MCHC RBC AUTO-ENTMCNC: 33 % (ref 32–36)
MCV RBC AUTO: 93.1 FL (ref 78–100)
MONOCYTES ABSOLUTE: 0.3 K/CU MM
MONOCYTES RELATIVE PERCENT: 9.1 % (ref 0–4)
PDW BLD-RTO: 13.2 % (ref 11.7–14.9)
PLATELET # BLD: 216 K/CU MM (ref 140–440)
PMV BLD AUTO: 8.8 FL (ref 7.5–11.1)
POTASSIUM SERPL-SCNC: 3.8 MMOL/L (ref 3.5–5.1)
RBC # BLD: 4.23 M/CU MM (ref 4.2–5.4)
SEGMENTED NEUTROPHILS ABSOLUTE COUNT: 2 K/CU MM
SEGMENTED NEUTROPHILS RELATIVE PERCENT: 56.1 % (ref 36–66)
SODIUM BLD-SCNC: 140 MMOL/L (ref 135–145)
TOTAL IMMATURE NEUTOROPHIL: 0 K/CU MM
TOTAL PROTEIN: 6.5 GM/DL (ref 6.4–8.2)
WBC # BLD: 3.6 K/CU MM (ref 4–10.5)

## 2019-01-11 PROCEDURE — 85025 COMPLETE CBC W/AUTO DIFF WBC: CPT

## 2019-01-11 PROCEDURE — 80053 COMPREHEN METABOLIC PANEL: CPT

## 2019-01-11 PROCEDURE — 36415 COLL VENOUS BLD VENIPUNCTURE: CPT

## 2019-01-11 PROCEDURE — 85652 RBC SED RATE AUTOMATED: CPT

## 2019-01-11 PROCEDURE — 86141 C-REACTIVE PROTEIN HS: CPT

## 2019-02-12 ENCOUNTER — HOSPITAL ENCOUNTER (OUTPATIENT)
Age: 74
Discharge: HOME | End: 2019-02-12
Payer: MEDICARE

## 2019-02-12 LAB
ALBUMIN SERPL-MCNC: 4.2 GM/DL (ref 3.4–5)
ALP BLD-CCNC: 52 IU/L (ref 40–129)
ALT SERPL-CCNC: 13 U/L (ref 10–40)
ANION GAP SERPL CALCULATED.3IONS-SCNC: 10 MMOL/L (ref 4–16)
AST SERPL-CCNC: 17 IU/L (ref 15–37)
BASOPHILS ABSOLUTE: 0 K/CU MM
BASOPHILS RELATIVE PERCENT: 1 % (ref 0–1)
BILIRUB SERPL-MCNC: 0.6 MG/DL (ref 0–1)
BUN BLDV-MCNC: 14 MG/DL (ref 6–23)
CALCIUM SERPL-MCNC: 9.5 MG/DL (ref 8.3–10.6)
CHLORIDE BLD-SCNC: 100 MMOL/L (ref 99–110)
CO2: 31 MMOL/L (ref 21–32)
CREAT SERPL-MCNC: 0.7 MG/DL (ref 0.6–1.1)
DIFFERENTIAL TYPE: ABNORMAL
EOSINOPHILS ABSOLUTE: 0 K/CU MM
EOSINOPHILS RELATIVE PERCENT: 0 % (ref 0–3)
FOLATE: >20 NG/ML (ref 3.1–17.5)
GFR AFRICAN AMERICAN: >60 ML/MIN/1.73M2
GFR NON-AFRICAN AMERICAN: >60 ML/MIN/1.73M2
GLUCOSE FASTING: 90 MG/DL (ref 70–99)
HCT VFR BLD CALC: 38.9 % (ref 37–47)
HEMOGLOBIN: 12.8 GM/DL (ref 12.5–16)
IMMATURE NEUTROPHIL %: 0 % (ref 0–0.43)
IRON: 90 UG/DL (ref 37–145)
LACTATE DEHYDROGENASE: 174 IU/L (ref 120–246)
LYMPHOCYTES ABSOLUTE: 0.9 K/CU MM
LYMPHOCYTES RELATIVE PERCENT: 32.8 % (ref 24–44)
MCH RBC QN AUTO: 31.3 PG (ref 27–31)
MCHC RBC AUTO-ENTMCNC: 32.9 % (ref 32–36)
MCV RBC AUTO: 95.1 FL (ref 78–100)
MONOCYTES ABSOLUTE: 0.3 K/CU MM
MONOCYTES RELATIVE PERCENT: 11.5 % (ref 0–4)
PCT TRANSFERRIN: 44 % (ref 10–44)
PDW BLD-RTO: 13.7 % (ref 11.7–14.9)
PLATELET # BLD: 201 K/CU MM (ref 140–440)
PMV BLD AUTO: 9.3 FL (ref 7.5–11.1)
POTASSIUM SERPL-SCNC: 4.2 MMOL/L (ref 3.5–5.1)
RBC # BLD: 4.09 M/CU MM (ref 4.2–5.4)
SEGMENTED NEUTROPHILS ABSOLUTE COUNT: 1.6 K/CU MM
SEGMENTED NEUTROPHILS RELATIVE PERCENT: 54.7 % (ref 36–66)
SODIUM BLD-SCNC: 141 MMOL/L (ref 135–145)
TOTAL IMMATURE NEUTOROPHIL: 0 K/CU MM
TOTAL IRON BINDING CAPACITY: 203 UG/DL (ref 250–450)
TOTAL PROTEIN: 6.7 GM/DL (ref 6.4–8.2)
UNSATURATED IRON BINDING CAPACITY: 113 UG/DL (ref 110–370)
VITAMIN B-12: 931.2 PG/ML (ref 211–911)
WBC # BLD: 2.9 K/CU MM (ref 4–10.5)

## 2019-02-12 PROCEDURE — 82746 ASSAY OF FOLIC ACID SERUM: CPT

## 2019-02-12 PROCEDURE — 80053 COMPREHEN METABOLIC PANEL: CPT

## 2019-02-12 PROCEDURE — 85025 COMPLETE CBC W/AUTO DIFF WBC: CPT

## 2019-02-12 PROCEDURE — 82607 VITAMIN B-12: CPT

## 2019-02-12 PROCEDURE — 83550 IRON BINDING TEST: CPT

## 2019-02-12 PROCEDURE — 36415 COLL VENOUS BLD VENIPUNCTURE: CPT

## 2019-02-12 PROCEDURE — 83615 LACTATE (LD) (LDH) ENZYME: CPT

## 2019-02-12 PROCEDURE — 83540 ASSAY OF IRON: CPT

## 2019-03-27 ENCOUNTER — HOSPITAL ENCOUNTER (OUTPATIENT)
Age: 74
Discharge: HOME | End: 2019-03-27
Payer: MEDICARE

## 2019-03-27 LAB
ALBUMIN SERPL-MCNC: 4.1 GM/DL (ref 3.4–5)
ALP BLD-CCNC: 53 IU/L (ref 40–129)
ALT SERPL-CCNC: 17 U/L (ref 10–40)
ANION GAP SERPL CALCULATED.3IONS-SCNC: 8 MMOL/L (ref 4–16)
AST SERPL-CCNC: 18 IU/L (ref 15–37)
BASOPHILS ABSOLUTE: 0 K/CU MM
BASOPHILS RELATIVE PERCENT: 1 % (ref 0–1)
BILIRUB SERPL-MCNC: 0.7 MG/DL (ref 0–1)
BUN BLDV-MCNC: 19 MG/DL (ref 6–23)
CALCIUM SERPL-MCNC: 9.3 MG/DL (ref 8.3–10.6)
CHLORIDE BLD-SCNC: 100 MMOL/L (ref 99–110)
CHOLESTEROL, FASTING: 102 MG/DL
CO2: 33 MMOL/L (ref 21–32)
CREAT SERPL-MCNC: 0.8 MG/DL (ref 0.6–1.1)
DIFFERENTIAL TYPE: ABNORMAL
EOSINOPHILS ABSOLUTE: 0 K/CU MM
EOSINOPHILS RELATIVE PERCENT: 0 % (ref 0–3)
FOLATE: >20 NG/ML (ref 3.1–17.5)
GFR AFRICAN AMERICAN: >60 ML/MIN/1.73M2
GFR NON-AFRICAN AMERICAN: >60 ML/MIN/1.73M2
GLUCOSE FASTING: 88 MG/DL (ref 70–99)
HCT VFR BLD CALC: 36.4 % (ref 37–47)
HDLC SERPL-MCNC: 53 MG/DL
HEMOGLOBIN: 11.9 GM/DL (ref 12.5–16)
IMMATURE NEUTROPHIL %: 0.3 % (ref 0–0.43)
IRON: 84 UG/DL (ref 37–145)
LDL CHOLESTEROL DIRECT: 51 MG/DL
LYMPHOCYTES ABSOLUTE: 1 K/CU MM
LYMPHOCYTES RELATIVE PERCENT: 32.4 % (ref 24–44)
MCH RBC QN AUTO: 31.6 PG (ref 27–31)
MCHC RBC AUTO-ENTMCNC: 32.7 % (ref 32–36)
MCV RBC AUTO: 96.6 FL (ref 78–100)
MONOCYTES ABSOLUTE: 0.3 K/CU MM
MONOCYTES RELATIVE PERCENT: 10.8 % (ref 0–4)
PDW BLD-RTO: 13.1 % (ref 11.7–14.9)
PLATELET # BLD: 177 K/CU MM (ref 140–440)
PMV BLD AUTO: 9.6 FL (ref 7.5–11.1)
POTASSIUM SERPL-SCNC: 4.1 MMOL/L (ref 3.5–5.1)
RBC # BLD: 3.77 M/CU MM (ref 4.2–5.4)
SEGMENTED NEUTROPHILS ABSOLUTE COUNT: 1.7 K/CU MM
SEGMENTED NEUTROPHILS RELATIVE PERCENT: 55.5 % (ref 36–66)
SODIUM BLD-SCNC: 141 MMOL/L (ref 135–145)
TOTAL IMMATURE NEUTOROPHIL: 0.01 K/CU MM
TOTAL PROTEIN: 6.4 GM/DL (ref 6.4–8.2)
TRIGLYCERIDE, FASTING: 49 MG/DL
VITAMIN B-12: 820.3 PG/ML (ref 211–911)
VITAMIN D 25-HYDROXY: 38.84 NG/ML
WBC # BLD: 3.1 K/CU MM (ref 4–10.5)

## 2019-03-27 PROCEDURE — 82306 VITAMIN D 25 HYDROXY: CPT

## 2019-03-27 PROCEDURE — 80053 COMPREHEN METABOLIC PANEL: CPT

## 2019-03-27 PROCEDURE — 83540 ASSAY OF IRON: CPT

## 2019-03-27 PROCEDURE — 36415 COLL VENOUS BLD VENIPUNCTURE: CPT

## 2019-03-27 PROCEDURE — 82607 VITAMIN B-12: CPT

## 2019-03-27 PROCEDURE — 80061 LIPID PANEL: CPT

## 2019-03-27 PROCEDURE — 85025 COMPLETE CBC W/AUTO DIFF WBC: CPT

## 2019-03-27 PROCEDURE — 82746 ASSAY OF FOLIC ACID SERUM: CPT

## 2019-04-17 ENCOUNTER — HOSPITAL ENCOUNTER (OUTPATIENT)
Age: 74
Discharge: HOME | End: 2019-04-17
Payer: MEDICARE

## 2019-04-17 LAB
ALBUMIN SERPL-MCNC: 4.2 GM/DL (ref 3.4–5)
ALP BLD-CCNC: 54 IU/L (ref 40–129)
ALT SERPL-CCNC: 13 U/L (ref 10–40)
ANION GAP SERPL CALCULATED.3IONS-SCNC: 10 MMOL/L (ref 4–16)
AST SERPL-CCNC: 18 IU/L (ref 15–37)
BASOPHILS ABSOLUTE: 0 K/CU MM
BASOPHILS RELATIVE PERCENT: 1.1 % (ref 0–1)
BILIRUB SERPL-MCNC: 0.7 MG/DL (ref 0–1)
BUN BLDV-MCNC: 14 MG/DL (ref 6–23)
CALCIUM SERPL-MCNC: 9.5 MG/DL (ref 8.3–10.6)
CHLORIDE BLD-SCNC: 99 MMOL/L (ref 99–110)
CO2: 30 MMOL/L (ref 21–32)
CREAT SERPL-MCNC: 0.8 MG/DL (ref 0.6–1.1)
DIFFERENTIAL TYPE: ABNORMAL
EOSINOPHILS ABSOLUTE: 0 K/CU MM
EOSINOPHILS RELATIVE PERCENT: 0.4 % (ref 0–3)
GFR AFRICAN AMERICAN: >60 ML/MIN/1.73M2
GFR NON-AFRICAN AMERICAN: >60 ML/MIN/1.73M2
GLUCOSE FASTING: 92 MG/DL (ref 70–99)
HCT VFR BLD CALC: 38.4 % (ref 37–47)
HEMOGLOBIN: 12.8 GM/DL (ref 12.5–16)
IMMATURE NEUTROPHIL %: 0 % (ref 0–0.43)
LYMPHOCYTES ABSOLUTE: 0.8 K/CU MM
LYMPHOCYTES RELATIVE PERCENT: 28.8 % (ref 24–44)
MCH RBC QN AUTO: 32.3 PG (ref 27–31)
MCHC RBC AUTO-ENTMCNC: 33.3 % (ref 32–36)
MCV RBC AUTO: 97 FL (ref 78–100)
MONOCYTES ABSOLUTE: 0.3 K/CU MM
MONOCYTES RELATIVE PERCENT: 11.9 % (ref 0–4)
PDW BLD-RTO: 13 % (ref 11.7–14.9)
PLATELET # BLD: 193 K/CU MM (ref 140–440)
PMV BLD AUTO: 9.5 FL (ref 7.5–11.1)
POTASSIUM SERPL-SCNC: 3.9 MMOL/L (ref 3.5–5.1)
RBC # BLD: 3.96 M/CU MM (ref 4.2–5.4)
SEGMENTED NEUTROPHILS ABSOLUTE COUNT: 1.7 K/CU MM
SEGMENTED NEUTROPHILS RELATIVE PERCENT: 57.8 % (ref 36–66)
SODIUM BLD-SCNC: 139 MMOL/L (ref 135–145)
TOTAL IMMATURE NEUTOROPHIL: 0 K/CU MM
TOTAL PROTEIN: 6.6 GM/DL (ref 6.4–8.2)
WBC # BLD: 2.9 K/CU MM (ref 4–10.5)

## 2019-04-17 PROCEDURE — 80053 COMPREHEN METABOLIC PANEL: CPT

## 2019-04-17 PROCEDURE — 85025 COMPLETE CBC W/AUTO DIFF WBC: CPT

## 2019-04-17 PROCEDURE — 36415 COLL VENOUS BLD VENIPUNCTURE: CPT

## 2019-06-15 ENCOUNTER — HOSPITAL ENCOUNTER (OUTPATIENT)
Age: 74
Discharge: HOME | End: 2019-06-15
Payer: MEDICARE

## 2019-06-15 LAB
ANION GAP SERPL CALCULATED.3IONS-SCNC: 2 MMOL/L (ref 4–16)
BUN BLDV-MCNC: 16 MG/DL (ref 6–23)
CALCIUM SERPL-MCNC: 9.6 MG/DL (ref 8.3–10.6)
CHLORIDE BLD-SCNC: 102 MMOL/L (ref 99–110)
CO2: 37 MMOL/L (ref 21–32)
CREAT SERPL-MCNC: 0.7 MG/DL (ref 0.6–1.1)
GFR AFRICAN AMERICAN: >60 ML/MIN/1.73M2
GFR NON-AFRICAN AMERICAN: >60 ML/MIN/1.73M2
GLUCOSE FASTING: 102 MG/DL (ref 70–99)
POTASSIUM SERPL-SCNC: 4.2 MMOL/L (ref 3.5–5.1)
SODIUM BLD-SCNC: 141 MMOL/L (ref 135–145)

## 2019-06-15 PROCEDURE — 36415 COLL VENOUS BLD VENIPUNCTURE: CPT

## 2019-06-15 PROCEDURE — 80048 BASIC METABOLIC PNL TOTAL CA: CPT

## 2019-06-27 ENCOUNTER — HOSPITAL ENCOUNTER (OUTPATIENT)
Age: 74
Discharge: HOME | End: 2019-06-27
Payer: MEDICARE

## 2019-06-27 LAB
ALBUMIN SERPL-MCNC: 4.3 GM/DL (ref 3.4–5)
ALP BLD-CCNC: 61 IU/L (ref 40–129)
ALT SERPL-CCNC: 15 U/L (ref 10–40)
ANION GAP SERPL CALCULATED.3IONS-SCNC: 9 MMOL/L (ref 4–16)
AST SERPL-CCNC: 18 IU/L (ref 15–37)
BASOPHILS ABSOLUTE: 0 K/CU MM
BASOPHILS RELATIVE PERCENT: 0.5 % (ref 0–1)
BILIRUB SERPL-MCNC: 0.7 MG/DL (ref 0–1)
BUN BLDV-MCNC: 17 MG/DL (ref 6–23)
CALCIUM SERPL-MCNC: 9.8 MG/DL (ref 8.3–10.6)
CHLORIDE BLD-SCNC: 99 MMOL/L (ref 99–110)
CHOLESTEROL, FASTING: 121 MG/DL
CO2: 32 MMOL/L (ref 21–32)
CREAT SERPL-MCNC: 0.7 MG/DL (ref 0.6–1.1)
DIFFERENTIAL TYPE: ABNORMAL
EOSINOPHILS ABSOLUTE: 0 K/CU MM
EOSINOPHILS RELATIVE PERCENT: 0.7 % (ref 0–3)
FERRITIN: 331 NG/ML (ref 15–150)
FOLATE: >20 NG/ML (ref 3.1–17.5)
GFR AFRICAN AMERICAN: >60 ML/MIN/1.73M2
GFR NON-AFRICAN AMERICAN: >60 ML/MIN/1.73M2
GLUCOSE FASTING: 103 MG/DL (ref 70–99)
HCT VFR BLD CALC: 40 % (ref 37–47)
HDLC SERPL-MCNC: 58 MG/DL
HEMOGLOBIN: 13.3 GM/DL (ref 12.5–16)
IMMATURE NEUTROPHIL %: 0 % (ref 0–0.43)
IRON: 140 UG/DL (ref 37–145)
LDL CHOLESTEROL DIRECT: 55 MG/DL
LYMPHOCYTES ABSOLUTE: 1.3 K/CU MM
LYMPHOCYTES RELATIVE PERCENT: 30.8 % (ref 24–44)
MCH RBC QN AUTO: 32 PG (ref 27–31)
MCHC RBC AUTO-ENTMCNC: 33.3 % (ref 32–36)
MCV RBC AUTO: 96.4 FL (ref 78–100)
MONOCYTES ABSOLUTE: 0.3 K/CU MM
MONOCYTES RELATIVE PERCENT: 7.5 % (ref 0–4)
PCT TRANSFERRIN: 56 % (ref 10–44)
PDW BLD-RTO: 13.1 % (ref 11.7–14.9)
PLATELET # BLD: 215 K/CU MM (ref 140–440)
PMV BLD AUTO: 9.6 FL (ref 7.5–11.1)
POTASSIUM SERPL-SCNC: 3.7 MMOL/L (ref 3.5–5.1)
RBC # BLD: 4.15 M/CU MM (ref 4.2–5.4)
RETICULOCYTE COUNT PCT: 2.2 % (ref 0.2–2.2)
SEGMENTED NEUTROPHILS ABSOLUTE COUNT: 2.5 K/CU MM
SEGMENTED NEUTROPHILS RELATIVE PERCENT: 60.5 % (ref 36–66)
SODIUM BLD-SCNC: 140 MMOL/L (ref 135–145)
T3 FREE: 3 PG/ML (ref 2.3–4.2)
T4 FREE: 1.65 NG/DL (ref 0.9–1.8)
TOTAL IMMATURE NEUTOROPHIL: 0 K/CU MM
TOTAL IRON BINDING CAPACITY: 251 UG/DL (ref 250–450)
TOTAL PROTEIN: 7 GM/DL (ref 6.4–8.2)
TRIGLYCERIDE, FASTING: 69 MG/DL
TSH HIGH SENSITIVITY: 1.22 UIU/ML (ref 0.27–4.2)
UNSATURATED IRON BINDING CAPACITY: 111 UG/DL (ref 110–370)
VITAMIN B-12: 1059 PG/ML (ref 211–911)
VITAMIN D 25-HYDROXY: 42.09 NG/ML
WBC # BLD: 4.2 K/CU MM (ref 4–10.5)

## 2019-06-27 PROCEDURE — 80061 LIPID PANEL: CPT

## 2019-06-27 PROCEDURE — 82746 ASSAY OF FOLIC ACID SERUM: CPT

## 2019-06-27 PROCEDURE — 84439 ASSAY OF FREE THYROXINE: CPT

## 2019-06-27 PROCEDURE — 82607 VITAMIN B-12: CPT

## 2019-06-27 PROCEDURE — 82728 ASSAY OF FERRITIN: CPT

## 2019-06-27 PROCEDURE — 85025 COMPLETE CBC W/AUTO DIFF WBC: CPT

## 2019-06-27 PROCEDURE — 85045 AUTOMATED RETICULOCYTE COUNT: CPT

## 2019-06-27 PROCEDURE — 84481 FREE ASSAY (FT-3): CPT

## 2019-06-27 PROCEDURE — 84443 ASSAY THYROID STIM HORMONE: CPT

## 2019-06-27 PROCEDURE — 82306 VITAMIN D 25 HYDROXY: CPT

## 2019-06-27 PROCEDURE — 83540 ASSAY OF IRON: CPT

## 2019-06-27 PROCEDURE — 83550 IRON BINDING TEST: CPT

## 2019-06-27 PROCEDURE — 80053 COMPREHEN METABOLIC PANEL: CPT

## 2019-06-27 PROCEDURE — 36415 COLL VENOUS BLD VENIPUNCTURE: CPT

## 2019-08-09 ENCOUNTER — HOSPITAL ENCOUNTER (OUTPATIENT)
Age: 74
Discharge: HOME | End: 2019-08-09
Payer: MEDICARE

## 2019-08-09 LAB
ALBUMIN SERPL-MCNC: 4 GM/DL (ref 3.4–5)
ALP BLD-CCNC: 63 IU/L (ref 40–129)
ALT SERPL-CCNC: 17 U/L (ref 10–40)
ANION GAP SERPL CALCULATED.3IONS-SCNC: 9 MMOL/L (ref 4–16)
AST SERPL-CCNC: 21 IU/L (ref 15–37)
BASOPHILS ABSOLUTE: 0 K/CU MM
BASOPHILS RELATIVE PERCENT: 0.7 % (ref 0–1)
BILIRUB SERPL-MCNC: 0.7 MG/DL (ref 0–1)
BUN BLDV-MCNC: 14 MG/DL (ref 6–23)
CALCIUM SERPL-MCNC: 9.7 MG/DL (ref 8.3–10.6)
CHLORIDE BLD-SCNC: 102 MMOL/L (ref 99–110)
CO2: 29 MMOL/L (ref 21–32)
CREAT SERPL-MCNC: 0.8 MG/DL (ref 0.6–1.1)
DIFFERENTIAL TYPE: ABNORMAL
EOSINOPHILS ABSOLUTE: 0.2 K/CU MM
EOSINOPHILS RELATIVE PERCENT: 3.7 % (ref 0–3)
FOLATE: 18.5 NG/ML (ref 3.1–17.5)
GFR AFRICAN AMERICAN: >60 ML/MIN/1.73M2
GFR NON-AFRICAN AMERICAN: >60 ML/MIN/1.73M2
GLUCOSE FASTING: 85 MG/DL (ref 70–99)
HCT VFR BLD CALC: 36 % (ref 37–47)
HEMOGLOBIN: 11.7 GM/DL (ref 12.5–16)
IMMATURE NEUTROPHIL %: 0.2 % (ref 0–0.43)
IRON: 77 UG/DL (ref 37–145)
LACTATE DEHYDROGENASE: 217 IU/L (ref 120–246)
LYMPHOCYTES ABSOLUTE: 0.9 K/CU MM
LYMPHOCYTES RELATIVE PERCENT: 23.3 % (ref 24–44)
MCH RBC QN AUTO: 31.6 PG (ref 27–31)
MCHC RBC AUTO-ENTMCNC: 32.5 % (ref 32–36)
MCV RBC AUTO: 97.3 FL (ref 78–100)
MONOCYTES ABSOLUTE: 0.5 K/CU MM
MONOCYTES RELATIVE PERCENT: 11.9 % (ref 0–4)
PCT TRANSFERRIN: 36 % (ref 10–44)
PDW BLD-RTO: 13.4 % (ref 11.7–14.9)
PLATELET # BLD: 210 K/CU MM (ref 140–440)
PMV BLD AUTO: 8.6 FL (ref 7.5–11.1)
POTASSIUM SERPL-SCNC: 4.4 MMOL/L (ref 3.5–5.1)
RBC # BLD: 3.7 M/CU MM (ref 4.2–5.4)
SEGMENTED NEUTROPHILS ABSOLUTE COUNT: 2.4 K/CU MM
SEGMENTED NEUTROPHILS RELATIVE PERCENT: 60.2 % (ref 36–66)
SODIUM BLD-SCNC: 140 MMOL/L (ref 135–145)
TOTAL IMMATURE NEUTOROPHIL: 0.01 K/CU MM
TOTAL IRON BINDING CAPACITY: 215 UG/DL (ref 250–450)
TOTAL PROTEIN: 6.7 GM/DL (ref 6.4–8.2)
UNSATURATED IRON BINDING CAPACITY: 138 UG/DL (ref 110–370)
VITAMIN B-12: 1359 PG/ML (ref 211–911)
WBC # BLD: 4 K/CU MM (ref 4–10.5)

## 2019-08-09 PROCEDURE — 88185 FLOWCYTOMETRY/TC ADD-ON: CPT

## 2019-08-09 PROCEDURE — 88182 CELL MARKER STUDY: CPT

## 2019-08-09 PROCEDURE — 85025 COMPLETE CBC W/AUTO DIFF WBC: CPT

## 2019-08-09 PROCEDURE — 83615 LACTATE (LD) (LDH) ENZYME: CPT

## 2019-08-09 PROCEDURE — 36415 COLL VENOUS BLD VENIPUNCTURE: CPT

## 2019-08-09 PROCEDURE — 88184 FLOWCYTOMETRY/ TC 1 MARKER: CPT

## 2019-08-09 PROCEDURE — 80053 COMPREHEN METABOLIC PANEL: CPT

## 2019-08-09 PROCEDURE — 83540 ASSAY OF IRON: CPT

## 2019-08-09 PROCEDURE — 82607 VITAMIN B-12: CPT

## 2019-08-09 PROCEDURE — 83550 IRON BINDING TEST: CPT

## 2019-08-09 PROCEDURE — 82746 ASSAY OF FOLIC ACID SERUM: CPT

## 2019-08-13 LAB — COMMENT: NORMAL

## 2019-10-29 ENCOUNTER — HOSPITAL ENCOUNTER (OUTPATIENT)
Age: 74
Discharge: HOME | End: 2019-10-29
Payer: MEDICARE

## 2019-10-29 LAB
ALBUMIN SERPL-MCNC: 4 GM/DL (ref 3.4–5)
ALP BLD-CCNC: 53 IU/L (ref 40–129)
ALT SERPL-CCNC: 17 U/L (ref 10–40)
ANION GAP SERPL CALCULATED.3IONS-SCNC: 8 MMOL/L (ref 4–16)
AST SERPL-CCNC: 18 IU/L (ref 15–37)
BASOPHILS ABSOLUTE: 0 K/CU MM
BASOPHILS RELATIVE PERCENT: 1.1 % (ref 0–1)
BILIRUB SERPL-MCNC: 0.5 MG/DL (ref 0–1)
BUN BLDV-MCNC: 19 MG/DL (ref 6–23)
CALCIUM SERPL-MCNC: 9.4 MG/DL (ref 8.3–10.6)
CHLORIDE BLD-SCNC: 100 MMOL/L (ref 99–110)
CHOLESTEROL, FASTING: 101 MG/DL
CO2: 32 MMOL/L (ref 21–32)
CREAT SERPL-MCNC: 0.7 MG/DL (ref 0.6–1.1)
DIFFERENTIAL TYPE: ABNORMAL
EOSINOPHILS ABSOLUTE: 0.3 K/CU MM
EOSINOPHILS RELATIVE PERCENT: 7.9 % (ref 0–3)
FERRITIN: 262 NG/ML (ref 15–150)
GFR AFRICAN AMERICAN: >60 ML/MIN/1.73M2
GFR NON-AFRICAN AMERICAN: >60 ML/MIN/1.73M2
GLUCOSE FASTING: 88 MG/DL (ref 70–99)
HCT VFR BLD CALC: 37.4 % (ref 37–47)
HDLC SERPL-MCNC: 53 MG/DL
HEMOGLOBIN: 12.3 GM/DL (ref 12.5–16)
IMMATURE NEUTROPHIL %: 0 % (ref 0–0.43)
IRON: 53 UG/DL (ref 37–145)
LDL CHOLESTEROL DIRECT: 45 MG/DL
LYMPHOCYTES ABSOLUTE: 1.1 K/CU MM
LYMPHOCYTES RELATIVE PERCENT: 28.8 % (ref 24–44)
MCH RBC QN AUTO: 32.5 PG (ref 27–31)
MCHC RBC AUTO-ENTMCNC: 32.9 % (ref 32–36)
MCV RBC AUTO: 98.9 FL (ref 78–100)
MONOCYTES ABSOLUTE: 0.4 K/CU MM
MONOCYTES RELATIVE PERCENT: 11.3 % (ref 0–4)
PCT TRANSFERRIN: 21 % (ref 10–44)
PDW BLD-RTO: 12.7 % (ref 11.7–14.9)
PLATELET # BLD: 185 K/CU MM (ref 140–440)
PMV BLD AUTO: 9.1 FL (ref 7.5–11.1)
POTASSIUM SERPL-SCNC: 4.2 MMOL/L (ref 3.5–5.1)
RBC # BLD: 3.78 M/CU MM (ref 4.2–5.4)
RETICULOCYTE COUNT PCT: 1.9 % (ref 0.2–2.2)
SEGMENTED NEUTROPHILS ABSOLUTE COUNT: 1.9 K/CU MM
SEGMENTED NEUTROPHILS RELATIVE PERCENT: 50.9 % (ref 36–66)
SODIUM BLD-SCNC: 140 MMOL/L (ref 135–145)
TOTAL IMMATURE NEUTOROPHIL: 0 K/CU MM
TOTAL IRON BINDING CAPACITY: 247 UG/DL (ref 250–450)
TOTAL PROTEIN: 6.5 GM/DL (ref 6.4–8.2)
TRANSFERRIN: 191.5 MG/DL (ref 200–360)
TRIGLYCERIDE, FASTING: 63 MG/DL
TSH HIGH SENSITIVITY: 0.98 UIU/ML (ref 0.27–4.2)
UNSATURATED IRON BINDING CAPACITY: 194 UG/DL (ref 110–370)
VITAMIN D 25-HYDROXY: 34.15 NG/ML
WBC # BLD: 3.8 K/CU MM (ref 4–10.5)

## 2019-10-29 PROCEDURE — 84466 ASSAY OF TRANSFERRIN: CPT

## 2019-10-29 PROCEDURE — 36415 COLL VENOUS BLD VENIPUNCTURE: CPT

## 2019-10-29 PROCEDURE — 85025 COMPLETE CBC W/AUTO DIFF WBC: CPT

## 2019-10-29 PROCEDURE — 80053 COMPREHEN METABOLIC PANEL: CPT

## 2019-10-29 PROCEDURE — 85045 AUTOMATED RETICULOCYTE COUNT: CPT

## 2019-10-29 PROCEDURE — 84443 ASSAY THYROID STIM HORMONE: CPT

## 2019-10-29 PROCEDURE — 80061 LIPID PANEL: CPT

## 2019-10-29 PROCEDURE — 82607 VITAMIN B-12: CPT

## 2019-10-29 PROCEDURE — 83540 ASSAY OF IRON: CPT

## 2019-10-29 PROCEDURE — 82746 ASSAY OF FOLIC ACID SERUM: CPT

## 2019-10-29 PROCEDURE — 82306 VITAMIN D 25 HYDROXY: CPT

## 2019-10-29 PROCEDURE — 82728 ASSAY OF FERRITIN: CPT

## 2019-10-31 LAB
FOLATE: >20 NG/ML (ref 3.1–17.5)
VITAMIN B-12: 962.9 PG/ML (ref 211–911)

## 2019-11-15 ENCOUNTER — HOSPITAL ENCOUNTER (OUTPATIENT)
Dept: INFUSION THERAPY | Age: 74
Setting detail: INFUSION SERIES
Discharge: HOME | End: 2019-11-15
Payer: MEDICARE

## 2019-11-15 VITALS
HEIGHT: 59 IN | RESPIRATION RATE: 16 BRPM | WEIGHT: 164 LBS | TEMPERATURE: 98.4 F | BODY MASS INDEX: 33.06 KG/M2 | DIASTOLIC BLOOD PRESSURE: 64 MMHG | SYSTOLIC BLOOD PRESSURE: 139 MMHG | HEART RATE: 72 BPM | OXYGEN SATURATION: 97 %

## 2019-11-15 PROCEDURE — 99211 OFF/OP EST MAY X REQ PHY/QHP: CPT

## 2019-11-15 PROCEDURE — 6360000002 HC RX W HCPCS: Performed by: FAMILY MEDICINE

## 2019-11-15 PROCEDURE — 96365 THER/PROPH/DIAG IV INF INIT: CPT

## 2019-11-15 RX ORDER — FOLIC ACID 1 MG/1
1 TABLET ORAL DAILY
COMMUNITY
End: 2020-01-01

## 2019-11-15 RX ORDER — ZOLEDRONIC ACID 5 MG/100ML
5 INJECTION, SOLUTION INTRAVENOUS ONCE
Status: COMPLETED | OUTPATIENT
Start: 2019-11-15 | End: 2019-11-15

## 2019-11-15 RX ADMIN — ZOLEDRONIC ACID 5 MG: 5 INJECTION INTRAVENOUS at 10:30

## 2020-01-01 ENCOUNTER — HOSPITAL ENCOUNTER (OUTPATIENT)
Age: 75
Discharge: HOME | End: 2020-02-26
Payer: MEDICARE

## 2020-01-01 ENCOUNTER — APPOINTMENT (OUTPATIENT)
Dept: GENERAL RADIOLOGY | Age: 75
DRG: 329 | End: 2020-01-01
Payer: MEDICARE

## 2020-01-01 ENCOUNTER — HOSPITAL ENCOUNTER (OUTPATIENT)
Dept: INFUSION THERAPY | Age: 75
Setting detail: INFUSION SERIES
Discharge: HOME | End: 2020-12-29
Payer: MEDICARE

## 2020-01-01 ENCOUNTER — HOSPITAL ENCOUNTER (OUTPATIENT)
Age: 75
Discharge: HOME | End: 2020-06-02
Payer: MEDICARE

## 2020-01-01 ENCOUNTER — ANESTHESIA (OUTPATIENT)
Dept: OPERATING ROOM | Age: 75
DRG: 329 | End: 2020-01-01
Payer: MEDICARE

## 2020-01-01 ENCOUNTER — ANESTHESIA EVENT (OUTPATIENT)
Dept: OPERATING ROOM | Age: 75
DRG: 329 | End: 2020-01-01
Payer: MEDICARE

## 2020-01-01 ENCOUNTER — HOSPITAL ENCOUNTER (INPATIENT)
Age: 75
LOS: 7 days | DRG: 329 | End: 2021-01-06
Attending: INTERNAL MEDICINE | Admitting: INTERNAL MEDICINE
Payer: MEDICARE

## 2020-01-01 ENCOUNTER — HOSPITAL ENCOUNTER (OUTPATIENT)
Age: 75
Discharge: HOME | End: 2020-11-12
Payer: MEDICARE

## 2020-01-01 ENCOUNTER — APPOINTMENT (OUTPATIENT)
Dept: CT IMAGING | Age: 75
DRG: 329 | End: 2020-01-01
Payer: MEDICARE

## 2020-01-01 ENCOUNTER — HOSPITAL ENCOUNTER (OUTPATIENT)
Age: 75
Discharge: HOME | End: 2020-06-25
Payer: MEDICARE

## 2020-01-01 VITALS
OXYGEN SATURATION: 100 % | SYSTOLIC BLOOD PRESSURE: 134 MMHG | DIASTOLIC BLOOD PRESSURE: 92 MMHG | RESPIRATION RATE: 20 BRPM | TEMPERATURE: 98.6 F

## 2020-01-01 DIAGNOSIS — R11.2 NON-INTRACTABLE VOMITING WITH NAUSEA, UNSPECIFIED VOMITING TYPE: ICD-10-CM

## 2020-01-01 DIAGNOSIS — M81.0 AGE-RELATED OSTEOPOROSIS WITHOUT CURRENT PATHOLOGICAL FRACTURE: Primary | ICD-10-CM

## 2020-01-01 DIAGNOSIS — R10.30 LOWER ABDOMINAL PAIN: Primary | ICD-10-CM

## 2020-01-01 DIAGNOSIS — D72.829 LEUKOCYTOSIS, UNSPECIFIED TYPE: ICD-10-CM

## 2020-01-01 LAB
ALBUMIN SERPL-MCNC: 4 GM/DL (ref 3.4–5)
ALBUMIN SERPL-MCNC: 4.1 GM/DL (ref 3.4–5)
ALBUMIN SERPL-MCNC: 4.1 GM/DL (ref 3.4–5)
ALBUMIN SERPL-MCNC: 4.2 GM/DL (ref 3.4–5)
ALP BLD-CCNC: 58 IU/L (ref 40–129)
ALP BLD-CCNC: 65 IU/L (ref 40–129)
ALP BLD-CCNC: 68 IU/L (ref 40–128)
ALP BLD-CCNC: 74 IU/L (ref 40–129)
ALT SERPL-CCNC: 12 U/L (ref 10–40)
ALT SERPL-CCNC: 13 U/L (ref 10–40)
ALT SERPL-CCNC: 17 U/L (ref 10–40)
ALT SERPL-CCNC: 17 U/L (ref 10–40)
ANION GAP SERPL CALCULATED.3IONS-SCNC: 10 MMOL/L (ref 4–16)
ANION GAP SERPL CALCULATED.3IONS-SCNC: 11 MMOL/L (ref 4–16)
ANION GAP SERPL CALCULATED.3IONS-SCNC: 11 MMOL/L (ref 4–16)
ANION GAP SERPL CALCULATED.3IONS-SCNC: 12 MMOL/L (ref 4–16)
ANION GAP SERPL CALCULATED.3IONS-SCNC: 5 MMOL/L (ref 4–16)
ANION GAP SERPL CALCULATED.3IONS-SCNC: 8 MMOL/L (ref 4–16)
AST SERPL-CCNC: 19 IU/L (ref 15–37)
AST SERPL-CCNC: 23 IU/L (ref 15–37)
BACTERIA: NEGATIVE /HPF
BASOPHILS ABSOLUTE: 0 K/CU MM
BASOPHILS ABSOLUTE: 0.1 K/CU MM
BASOPHILS ABSOLUTE: 0.1 K/CU MM
BASOPHILS RELATIVE PERCENT: 0.1 % (ref 0–1)
BASOPHILS RELATIVE PERCENT: 0.2 % (ref 0–1)
BASOPHILS RELATIVE PERCENT: 0.2 % (ref 0–1)
BASOPHILS RELATIVE PERCENT: 0.7 % (ref 0–1)
BASOPHILS RELATIVE PERCENT: 0.9 % (ref 0–1)
BASOPHILS RELATIVE PERCENT: 1.2 % (ref 0–1)
BASOPHILS RELATIVE PERCENT: 1.2 % (ref 0–1)
BILIRUB SERPL-MCNC: 0.5 MG/DL (ref 0–1)
BILIRUB SERPL-MCNC: 0.6 MG/DL (ref 0–1)
BILIRUB SERPL-MCNC: 0.7 MG/DL (ref 0–1)
BILIRUB SERPL-MCNC: 0.9 MG/DL (ref 0–1)
BILIRUBIN URINE: NEGATIVE MG/DL
BLOOD, URINE: ABNORMAL
BUN BLDV-MCNC: 13 MG/DL (ref 6–23)
BUN BLDV-MCNC: 14 MG/DL (ref 6–23)
BUN BLDV-MCNC: 15 MG/DL (ref 6–23)
BUN BLDV-MCNC: 16 MG/DL (ref 6–23)
BUN BLDV-MCNC: 18 MG/DL (ref 6–23)
BUN BLDV-MCNC: 22 MG/DL (ref 6–23)
CALCIUM SERPL-MCNC: 8.3 MG/DL (ref 8.3–10.6)
CALCIUM SERPL-MCNC: 8.3 MG/DL (ref 8.3–10.6)
CALCIUM SERPL-MCNC: 9.2 MG/DL (ref 8.3–10.6)
CALCIUM SERPL-MCNC: 9.4 MG/DL (ref 8.3–10.6)
CALCIUM SERPL-MCNC: 9.4 MG/DL (ref 8.3–10.6)
CALCIUM SERPL-MCNC: 9.8 MG/DL (ref 8.3–10.6)
CHLORIDE BLD-SCNC: 100 MMOL/L (ref 99–110)
CHLORIDE BLD-SCNC: 101 MMOL/L (ref 99–110)
CHLORIDE BLD-SCNC: 102 MMOL/L (ref 99–110)
CHLORIDE BLD-SCNC: 104 MMOL/L (ref 99–110)
CHLORIDE BLD-SCNC: 98 MMOL/L (ref 99–110)
CHLORIDE BLD-SCNC: 99 MMOL/L (ref 99–110)
CHOLESTEROL, FASTING: 108 MG/DL
CHOLESTEROL, FASTING: 113 MG/DL
CLARITY: CLEAR
CO2: 24 MMOL/L (ref 21–32)
CO2: 24 MMOL/L (ref 21–32)
CO2: 25 MMOL/L (ref 21–32)
CO2: 30 MMOL/L (ref 21–32)
CO2: 31 MMOL/L (ref 21–32)
CO2: 34 MMOL/L (ref 21–32)
COLOR: YELLOW
CREAT SERPL-MCNC: 0.6 MG/DL (ref 0.6–1.1)
CREAT SERPL-MCNC: 0.7 MG/DL (ref 0.6–1.1)
DIFFERENTIAL TYPE: ABNORMAL
EKG ATRIAL RATE: 82 BPM
EKG DIAGNOSIS: NORMAL
EKG P AXIS: 47 DEGREES
EKG P-R INTERVAL: 164 MS
EKG Q-T INTERVAL: 394 MS
EKG QRS DURATION: 92 MS
EKG QTC CALCULATION (BAZETT): 460 MS
EKG R AXIS: 3 DEGREES
EKG T AXIS: 36 DEGREES
EKG VENTRICULAR RATE: 82 BPM
EOSINOPHILS ABSOLUTE: 0 K/CU MM
EOSINOPHILS ABSOLUTE: 0.3 K/CU MM
EOSINOPHILS RELATIVE PERCENT: 0 % (ref 0–3)
EOSINOPHILS RELATIVE PERCENT: 0.1 % (ref 0–3)
EOSINOPHILS RELATIVE PERCENT: 0.1 % (ref 0–3)
EOSINOPHILS RELATIVE PERCENT: 5.9 % (ref 0–3)
EOSINOPHILS RELATIVE PERCENT: 6.8 % (ref 0–3)
EOSINOPHILS RELATIVE PERCENT: 7.5 % (ref 0–3)
EOSINOPHILS RELATIVE PERCENT: 7.8 % (ref 0–3)
ERYTHROCYTE SEDIMENTATION RATE: 17 MM/HR (ref 0–30)
FERRITIN: 233 NG/ML (ref 15–150)
FERRITIN: 255 NG/ML (ref 15–150)
FERRITIN: 261 NG/ML (ref 15–150)
FOLATE: 19.7 NG/ML (ref 3.1–17.5)
FOLATE: >20 NG/ML (ref 3.1–17.5)
GFR AFRICAN AMERICAN: >60 ML/MIN/1.73M2
GFR NON-AFRICAN AMERICAN: >60 ML/MIN/1.73M2
GLUCOSE BLD-MCNC: 129 MG/DL (ref 70–99)
GLUCOSE BLD-MCNC: 219 MG/DL (ref 70–99)
GLUCOSE BLD-MCNC: 98 MG/DL (ref 70–99)
GLUCOSE FASTING: 101 MG/DL (ref 70–99)
GLUCOSE FASTING: 92 MG/DL (ref 70–99)
GLUCOSE FASTING: 98 MG/DL (ref 70–99)
GLUCOSE, URINE: NEGATIVE MG/DL
HCT VFR BLD CALC: 35.5 % (ref 37–47)
HCT VFR BLD CALC: 36.7 % (ref 37–47)
HCT VFR BLD CALC: 39.2 % (ref 37–47)
HCT VFR BLD CALC: 40.2 % (ref 37–47)
HCT VFR BLD CALC: 40.9 % (ref 37–47)
HCT VFR BLD CALC: 40.9 % (ref 37–47)
HCT VFR BLD CALC: 43.9 % (ref 37–47)
HDLC SERPL-MCNC: 55 MG/DL
HDLC SERPL-MCNC: 62 MG/DL
HEMOGLOBIN: 11.7 GM/DL (ref 12.5–16)
HEMOGLOBIN: 12.3 GM/DL (ref 12.5–16)
HEMOGLOBIN: 12.6 GM/DL (ref 12.5–16)
HEMOGLOBIN: 12.9 GM/DL (ref 12.5–16)
HEMOGLOBIN: 13.1 GM/DL (ref 12.5–16)
HEMOGLOBIN: 13.1 GM/DL (ref 12.5–16)
HEMOGLOBIN: 14.2 GM/DL (ref 12.5–16)
HIGH SENSITIVE C-REACTIVE PROTEIN: 439.6 MG/L
IMMATURE NEUTROPHIL %: 0 % (ref 0–0.43)
IMMATURE NEUTROPHIL %: 0.3 % (ref 0–0.43)
IMMATURE NEUTROPHIL %: 0.5 % (ref 0–0.43)
IMMATURE NEUTROPHIL %: 0.7 % (ref 0–0.43)
IRON: 57 UG/DL (ref 37–145)
IRON: 80 UG/DL (ref 37–145)
IRON: 98 UG/DL (ref 37–145)
KETONES, URINE: NEGATIVE MG/DL
LACTATE DEHYDROGENASE: 184 IU/L (ref 120–246)
LACTATE: 1.6 MMOL/L (ref 0.4–2)
LACTIC ACID, SEPSIS: 0.7 MMOL/L (ref 0.5–1.9)
LDL CHOLESTEROL DIRECT: 46 MG/DL
LDL CHOLESTEROL DIRECT: 49 MG/DL
LEUKOCYTE ESTERASE, URINE: NEGATIVE
LIPASE: 40 IU/L (ref 13–60)
LYMPHOCYTES ABSOLUTE: 0.5 K/CU MM
LYMPHOCYTES ABSOLUTE: 0.7 K/CU MM
LYMPHOCYTES ABSOLUTE: 1.1 K/CU MM
LYMPHOCYTES ABSOLUTE: 1.1 K/CU MM
LYMPHOCYTES ABSOLUTE: 1.2 K/CU MM
LYMPHOCYTES ABSOLUTE: 1.3 K/CU MM
LYMPHOCYTES ABSOLUTE: 1.3 K/CU MM
LYMPHOCYTES RELATIVE PERCENT: 2.5 % (ref 24–44)
LYMPHOCYTES RELATIVE PERCENT: 26.2 % (ref 24–44)
LYMPHOCYTES RELATIVE PERCENT: 27.5 % (ref 24–44)
LYMPHOCYTES RELATIVE PERCENT: 28.8 % (ref 24–44)
LYMPHOCYTES RELATIVE PERCENT: 30.8 % (ref 24–44)
LYMPHOCYTES RELATIVE PERCENT: 4.6 % (ref 24–44)
LYMPHOCYTES RELATIVE PERCENT: 7.8 % (ref 24–44)
MAGNESIUM: 1.8 MG/DL (ref 1.8–2.4)
MAGNESIUM: 1.9 MG/DL (ref 1.8–2.4)
MCH RBC QN AUTO: 31.3 PG (ref 27–31)
MCH RBC QN AUTO: 31.3 PG (ref 27–31)
MCH RBC QN AUTO: 31.5 PG (ref 27–31)
MCH RBC QN AUTO: 31.8 PG (ref 27–31)
MCH RBC QN AUTO: 32.2 PG (ref 27–31)
MCH RBC QN AUTO: 32.3 PG (ref 27–31)
MCH RBC QN AUTO: 32.3 PG (ref 27–31)
MCHC RBC AUTO-ENTMCNC: 32 % (ref 32–36)
MCHC RBC AUTO-ENTMCNC: 32 % (ref 32–36)
MCHC RBC AUTO-ENTMCNC: 32.1 % (ref 32–36)
MCHC RBC AUTO-ENTMCNC: 32.1 % (ref 32–36)
MCHC RBC AUTO-ENTMCNC: 32.3 % (ref 32–36)
MCHC RBC AUTO-ENTMCNC: 33 % (ref 32–36)
MCHC RBC AUTO-ENTMCNC: 33.5 % (ref 32–36)
MCV RBC AUTO: 96.3 FL (ref 78–100)
MCV RBC AUTO: 97.3 FL (ref 78–100)
MCV RBC AUTO: 97.6 FL (ref 78–100)
MCV RBC AUTO: 97.8 FL (ref 78–100)
MCV RBC AUTO: 98.3 FL (ref 78–100)
MCV RBC AUTO: 99 FL (ref 78–100)
MCV RBC AUTO: 99.8 FL (ref 78–100)
MONOCYTES ABSOLUTE: 0.4 K/CU MM
MONOCYTES ABSOLUTE: 0.4 K/CU MM
MONOCYTES ABSOLUTE: 0.5 K/CU MM
MONOCYTES ABSOLUTE: 0.5 K/CU MM
MONOCYTES ABSOLUTE: 0.6 K/CU MM
MONOCYTES ABSOLUTE: 0.7 K/CU MM
MONOCYTES ABSOLUTE: 0.8 K/CU MM
MONOCYTES RELATIVE PERCENT: 10.9 % (ref 0–4)
MONOCYTES RELATIVE PERCENT: 11.1 % (ref 0–4)
MONOCYTES RELATIVE PERCENT: 3.7 % (ref 0–4)
MONOCYTES RELATIVE PERCENT: 4.2 % (ref 0–4)
MONOCYTES RELATIVE PERCENT: 5.1 % (ref 0–4)
MONOCYTES RELATIVE PERCENT: 8.4 % (ref 0–4)
MONOCYTES RELATIVE PERCENT: 8.8 % (ref 0–4)
NITRITE URINE, QUANTITATIVE: NEGATIVE
NUCLEATED RBC %: 0 %
PCT TRANSFERRIN: 23 % (ref 10–44)
PCT TRANSFERRIN: 41 % (ref 10–44)
PDW BLD-RTO: 12.9 % (ref 11.7–14.9)
PDW BLD-RTO: 13 % (ref 11.7–14.9)
PDW BLD-RTO: 13.2 % (ref 11.7–14.9)
PDW BLD-RTO: 13.2 % (ref 11.7–14.9)
PDW BLD-RTO: 13.5 % (ref 11.7–14.9)
PDW BLD-RTO: 13.5 % (ref 11.7–14.9)
PDW BLD-RTO: 13.6 % (ref 11.7–14.9)
PH, URINE: 5 (ref 5–8)
PLATELET # BLD: 178 K/CU MM (ref 140–440)
PLATELET # BLD: 192 K/CU MM (ref 140–440)
PLATELET # BLD: 200 K/CU MM (ref 140–440)
PLATELET # BLD: 207 K/CU MM (ref 140–440)
PLATELET # BLD: 212 K/CU MM (ref 140–440)
PLATELET # BLD: 222 K/CU MM (ref 140–440)
PLATELET # BLD: 263 K/CU MM (ref 140–440)
PMV BLD AUTO: 8.8 FL (ref 7.5–11.1)
PMV BLD AUTO: 9.2 FL (ref 7.5–11.1)
PMV BLD AUTO: 9.4 FL (ref 7.5–11.1)
POTASSIUM SERPL-SCNC: 3.4 MMOL/L (ref 3.5–5.1)
POTASSIUM SERPL-SCNC: 3.4 MMOL/L (ref 3.5–5.1)
POTASSIUM SERPL-SCNC: 3.8 MMOL/L (ref 3.5–5.1)
POTASSIUM SERPL-SCNC: 3.8 MMOL/L (ref 3.5–5.1)
POTASSIUM SERPL-SCNC: 4 MMOL/L (ref 3.5–5.1)
POTASSIUM SERPL-SCNC: 4.1 MMOL/L (ref 3.5–5.1)
PRO-BNP: 172.4 PG/ML
PROTEIN UA: NEGATIVE MG/DL
RBC # BLD: 3.63 M/CU MM (ref 4.2–5.4)
RBC # BLD: 3.81 M/CU MM (ref 4.2–5.4)
RBC # BLD: 4.03 M/CU MM (ref 4.2–5.4)
RBC # BLD: 4.06 M/CU MM (ref 4.2–5.4)
RBC # BLD: 4.16 M/CU MM (ref 4.2–5.4)
RBC # BLD: 4.19 M/CU MM (ref 4.2–5.4)
RBC # BLD: 4.4 M/CU MM (ref 4.2–5.4)
RBC URINE: 3 /HPF (ref 0–6)
RETICULOCYTE COUNT PCT: 1.6 % (ref 0.2–2.2)
RETICULOCYTE COUNT PCT: 1.9 % (ref 0.2–2.2)
SARS-COV-2, NAAT: NOT DETECTED
SEGMENTED NEUTROPHILS ABSOLUTE COUNT: 11.9 K/CU MM
SEGMENTED NEUTROPHILS ABSOLUTE COUNT: 13.8 K/CU MM
SEGMENTED NEUTROPHILS ABSOLUTE COUNT: 18.5 K/CU MM
SEGMENTED NEUTROPHILS ABSOLUTE COUNT: 2.3 K/CU MM
SEGMENTED NEUTROPHILS ABSOLUTE COUNT: 2.4 K/CU MM
SEGMENTED NEUTROPHILS RELATIVE PERCENT: 52.8 % (ref 36–66)
SEGMENTED NEUTROPHILS RELATIVE PERCENT: 53.7 % (ref 36–66)
SEGMENTED NEUTROPHILS RELATIVE PERCENT: 54 % (ref 36–66)
SEGMENTED NEUTROPHILS RELATIVE PERCENT: 55 % (ref 36–66)
SEGMENTED NEUTROPHILS RELATIVE PERCENT: 86.1 % (ref 36–66)
SEGMENTED NEUTROPHILS RELATIVE PERCENT: 91.3 % (ref 36–66)
SEGMENTED NEUTROPHILS RELATIVE PERCENT: 92.5 % (ref 36–66)
SODIUM BLD-SCNC: 133 MMOL/L (ref 135–145)
SODIUM BLD-SCNC: 137 MMOL/L (ref 135–145)
SODIUM BLD-SCNC: 139 MMOL/L (ref 135–145)
SODIUM BLD-SCNC: 140 MMOL/L (ref 135–145)
SOURCE: NORMAL
SPECIFIC GRAVITY UA: 1.03 (ref 1–1.03)
SQUAMOUS EPITHELIAL: 7 /HPF
T3 FREE: 3.6 PG/ML (ref 2.3–4.2)
T4 FREE: 1.51 NG/DL (ref 0.9–1.8)
TOTAL IMMATURE NEUTOROPHIL: 0 K/CU MM
TOTAL IMMATURE NEUTOROPHIL: 0.05 K/CU MM
TOTAL IMMATURE NEUTOROPHIL: 0.09 K/CU MM
TOTAL IMMATURE NEUTOROPHIL: 0.1 K/CU MM
TOTAL IRON BINDING CAPACITY: 238 UG/DL (ref 250–450)
TOTAL IRON BINDING CAPACITY: 245 UG/DL (ref 250–450)
TOTAL NUCLEATED RBC: 0 K/CU MM
TOTAL PROTEIN: 6.6 GM/DL (ref 6.4–8.2)
TOTAL PROTEIN: 6.6 GM/DL (ref 6.4–8.2)
TOTAL PROTEIN: 6.9 GM/DL (ref 6.4–8.2)
TOTAL PROTEIN: 6.9 GM/DL (ref 6.4–8.2)
TRANSFERRIN: 190.8 MG/DL (ref 200–360)
TRANSITIONAL EPITHELIAL: <1 /HPF
TRICHOMONAS: ABNORMAL /HPF
TRIGLYCERIDE, FASTING: 52 MG/DL
TRIGLYCERIDE, FASTING: 61 MG/DL
TROPONIN T: <0.01 NG/ML
TSH HIGH SENSITIVITY: 0.88 UIU/ML (ref 0.27–4.2)
UNSATURATED IRON BINDING CAPACITY: 140 UG/DL (ref 110–370)
UNSATURATED IRON BINDING CAPACITY: 188 UG/DL (ref 110–370)
UROBILINOGEN, URINE: NORMAL MG/DL (ref 0.2–1)
VITAMIN B-12: 1032 PG/ML (ref 211–911)
VITAMIN B-12: 1041 PG/ML (ref 211–911)
VITAMIN B-12: 1095 PG/ML (ref 211–911)
VITAMIN B-12: 708.5 PG/ML (ref 211–911)
VITAMIN D 25-HYDROXY: 29 NG/ML
VITAMIN D 25-HYDROXY: 30.68 NG/ML
VITAMIN D 25-HYDROXY: 38.97 NG/ML
WBC # BLD: 13.8 K/CU MM (ref 4–10.5)
WBC # BLD: 15.1 K/CU MM (ref 4–10.5)
WBC # BLD: 20 K/CU MM (ref 4–10.5)
WBC # BLD: 4.2 K/CU MM (ref 4–10.5)
WBC # BLD: 4.3 K/CU MM (ref 4–10.5)
WBC # BLD: 4.3 K/CU MM (ref 4–10.5)
WBC # BLD: 4.4 K/CU MM (ref 4–10.5)
WBC UA: 1 /HPF (ref 0–5)

## 2020-01-01 PROCEDURE — 84481 FREE ASSAY (FT-3): CPT

## 2020-01-01 PROCEDURE — 6370000000 HC RX 637 (ALT 250 FOR IP): Performed by: INTERNAL MEDICINE

## 2020-01-01 PROCEDURE — 96374 THER/PROPH/DIAG INJ IV PUSH: CPT

## 2020-01-01 PROCEDURE — 6370000000 HC RX 637 (ALT 250 FOR IP): Performed by: SURGERY

## 2020-01-01 PROCEDURE — 2700000000 HC OXYGEN THERAPY PER DAY

## 2020-01-01 PROCEDURE — 2580000003 HC RX 258: Performed by: SURGERY

## 2020-01-01 PROCEDURE — 82306 VITAMIN D 25 HYDROXY: CPT

## 2020-01-01 PROCEDURE — 80061 LIPID PANEL: CPT

## 2020-01-01 PROCEDURE — 82746 ASSAY OF FOLIC ACID SERUM: CPT

## 2020-01-01 PROCEDURE — 97116 GAIT TRAINING THERAPY: CPT

## 2020-01-01 PROCEDURE — 84443 ASSAY THYROID STIM HORMONE: CPT

## 2020-01-01 PROCEDURE — 82728 ASSAY OF FERRITIN: CPT

## 2020-01-01 PROCEDURE — 1200000000 HC SEMI PRIVATE

## 2020-01-01 PROCEDURE — 97530 THERAPEUTIC ACTIVITIES: CPT

## 2020-01-01 PROCEDURE — 36415 COLL VENOUS BLD VENIPUNCTURE: CPT

## 2020-01-01 PROCEDURE — 3600000003 HC SURGERY LEVEL 3 BASE: Performed by: SURGERY

## 2020-01-01 PROCEDURE — 74177 CT ABD & PELVIS W/CONTRAST: CPT

## 2020-01-01 PROCEDURE — 93010 ELECTROCARDIOGRAM REPORT: CPT | Performed by: INTERNAL MEDICINE

## 2020-01-01 PROCEDURE — 99024 POSTOP FOLLOW-UP VISIT: CPT | Performed by: SURGERY

## 2020-01-01 PROCEDURE — 83690 ASSAY OF LIPASE: CPT

## 2020-01-01 PROCEDURE — 96375 TX/PRO/DX INJ NEW DRUG ADDON: CPT

## 2020-01-01 PROCEDURE — 83540 ASSAY OF IRON: CPT

## 2020-01-01 PROCEDURE — 2580000003 HC RX 258: Performed by: INTERNAL MEDICINE

## 2020-01-01 PROCEDURE — 82607 VITAMIN B-12: CPT

## 2020-01-01 PROCEDURE — 85025 COMPLETE CBC W/AUTO DIFF WBC: CPT

## 2020-01-01 PROCEDURE — 80053 COMPREHEN METABOLIC PANEL: CPT

## 2020-01-01 PROCEDURE — 6360000002 HC RX W HCPCS: Performed by: NURSE ANESTHETIST, CERTIFIED REGISTERED

## 2020-01-01 PROCEDURE — G0378 HOSPITAL OBSERVATION PER HR: HCPCS

## 2020-01-01 PROCEDURE — 83880 ASSAY OF NATRIURETIC PEPTIDE: CPT

## 2020-01-01 PROCEDURE — 6360000002 HC RX W HCPCS

## 2020-01-01 PROCEDURE — 85652 RBC SED RATE AUTOMATED: CPT

## 2020-01-01 PROCEDURE — 6360000002 HC RX W HCPCS: Performed by: SURGERY

## 2020-01-01 PROCEDURE — 85045 AUTOMATED RETICULOCYTE COUNT: CPT

## 2020-01-01 PROCEDURE — 44145 PARTIAL REMOVAL OF COLON: CPT | Performed by: NURSE PRACTITIONER

## 2020-01-01 PROCEDURE — 80048 BASIC METABOLIC PNL TOTAL CA: CPT

## 2020-01-01 PROCEDURE — 3700000000 HC ANESTHESIA ATTENDED CARE: Performed by: SURGERY

## 2020-01-01 PROCEDURE — 6360000002 HC RX W HCPCS: Performed by: PHYSICIAN ASSISTANT

## 2020-01-01 PROCEDURE — 6360000002 HC RX W HCPCS: Performed by: ANESTHESIOLOGY

## 2020-01-01 PROCEDURE — 97162 PT EVAL MOD COMPLEX 30 MIN: CPT

## 2020-01-01 PROCEDURE — C9113 INJ PANTOPRAZOLE SODIUM, VIA: HCPCS | Performed by: SURGERY

## 2020-01-01 PROCEDURE — 99218 PR INITIAL OBSERVATION CARE/DAY 30 MINUTES: CPT | Performed by: SURGERY

## 2020-01-01 PROCEDURE — 96366 THER/PROPH/DIAG IV INF ADDON: CPT

## 2020-01-01 PROCEDURE — 83550 IRON BINDING TEST: CPT

## 2020-01-01 PROCEDURE — 6360000004 HC RX CONTRAST MEDICATION: Performed by: PHYSICIAN ASSISTANT

## 2020-01-01 PROCEDURE — 2720000010 HC SURG SUPPLY STERILE: Performed by: SURGERY

## 2020-01-01 PROCEDURE — 2500000003 HC RX 250 WO HCPCS: Performed by: SURGERY

## 2020-01-01 PROCEDURE — 6360000002 HC RX W HCPCS: Performed by: INTERNAL MEDICINE

## 2020-01-01 PROCEDURE — 83615 LACTATE (LD) (LDH) ENZYME: CPT

## 2020-01-01 PROCEDURE — 51702 INSERT TEMP BLADDER CATH: CPT

## 2020-01-01 PROCEDURE — 86141 C-REACTIVE PROTEIN HS: CPT

## 2020-01-01 PROCEDURE — 2709999900 HC NON-CHARGEABLE SUPPLY: Performed by: SURGERY

## 2020-01-01 PROCEDURE — 99225 PR SBSQ OBSERVATION CARE/DAY 25 MINUTES: CPT | Performed by: SURGERY

## 2020-01-01 PROCEDURE — 51798 US URINE CAPACITY MEASURE: CPT

## 2020-01-01 PROCEDURE — 96376 TX/PRO/DX INJ SAME DRUG ADON: CPT

## 2020-01-01 PROCEDURE — 97166 OT EVAL MOD COMPLEX 45 MIN: CPT

## 2020-01-01 PROCEDURE — 7100000001 HC PACU RECOVERY - ADDTL 15 MIN: Performed by: SURGERY

## 2020-01-01 PROCEDURE — U0002 COVID-19 LAB TEST NON-CDC: HCPCS

## 2020-01-01 PROCEDURE — 84439 ASSAY OF FREE THYROXINE: CPT

## 2020-01-01 PROCEDURE — 81001 URINALYSIS AUTO W/SCOPE: CPT

## 2020-01-01 PROCEDURE — 3600000013 HC SURGERY LEVEL 3 ADDTL 15MIN: Performed by: SURGERY

## 2020-01-01 PROCEDURE — 74019 RADEX ABDOMEN 2 VIEWS: CPT

## 2020-01-01 PROCEDURE — 84484 ASSAY OF TROPONIN QUANT: CPT

## 2020-01-01 PROCEDURE — 96365 THER/PROPH/DIAG IV INF INIT: CPT

## 2020-01-01 PROCEDURE — 83605 ASSAY OF LACTIC ACID: CPT

## 2020-01-01 PROCEDURE — 99283 EMERGENCY DEPT VISIT LOW MDM: CPT

## 2020-01-01 PROCEDURE — 2500000003 HC RX 250 WO HCPCS: Performed by: NURSE ANESTHETIST, CERTIFIED REGISTERED

## 2020-01-01 PROCEDURE — 2580000003 HC RX 258: Performed by: PHYSICIAN ASSISTANT

## 2020-01-01 PROCEDURE — 88307 TISSUE EXAM BY PATHOLOGIST: CPT | Performed by: PATHOLOGY

## 2020-01-01 PROCEDURE — 96372 THER/PROPH/DIAG INJ SC/IM: CPT

## 2020-01-01 PROCEDURE — 83735 ASSAY OF MAGNESIUM: CPT

## 2020-01-01 PROCEDURE — 3700000001 HC ADD 15 MINUTES (ANESTHESIA): Performed by: SURGERY

## 2020-01-01 PROCEDURE — 93005 ELECTROCARDIOGRAM TRACING: CPT | Performed by: PHYSICIAN ASSISTANT

## 2020-01-01 PROCEDURE — 84145 PROCALCITONIN (PCT): CPT

## 2020-01-01 PROCEDURE — 7100000000 HC PACU RECOVERY - FIRST 15 MIN: Performed by: SURGERY

## 2020-01-01 PROCEDURE — 2500000003 HC RX 250 WO HCPCS

## 2020-01-01 PROCEDURE — 94761 N-INVAS EAR/PLS OXIMETRY MLT: CPT

## 2020-01-01 PROCEDURE — 84466 ASSAY OF TRANSFERRIN: CPT

## 2020-01-01 PROCEDURE — 0DTN0ZZ RESECTION OF SIGMOID COLON, OPEN APPROACH: ICD-10-PCS | Performed by: SURGERY

## 2020-01-01 PROCEDURE — 44145 PARTIAL REMOVAL OF COLON: CPT | Performed by: SURGERY

## 2020-01-01 PROCEDURE — C9113 INJ PANTOPRAZOLE SODIUM, VIA: HCPCS | Performed by: INTERNAL MEDICINE

## 2020-01-01 PROCEDURE — 71045 X-RAY EXAM CHEST 1 VIEW: CPT

## 2020-01-01 RX ORDER — 0.9 % SODIUM CHLORIDE 0.9 %
1000 INTRAVENOUS SOLUTION INTRAVENOUS ONCE
Status: COMPLETED | OUTPATIENT
Start: 2020-01-01 | End: 2020-01-01

## 2020-01-01 RX ORDER — ATORVASTATIN CALCIUM 10 MG/1
10 TABLET, FILM COATED ORAL NIGHTLY
Status: DISCONTINUED | OUTPATIENT
Start: 2020-01-01 | End: 2021-01-01

## 2020-01-01 RX ORDER — HYDROCODONE BITARTRATE AND ACETAMINOPHEN 5; 325 MG/1; MG/1
1 TABLET ORAL PRN
Status: DISCONTINUED | OUTPATIENT
Start: 2020-01-01 | End: 2020-01-01 | Stop reason: HOSPADM

## 2020-01-01 RX ORDER — SODIUM CHLORIDE 9 MG/ML
INJECTION, SOLUTION INTRAVENOUS CONTINUOUS
Status: DISCONTINUED | OUTPATIENT
Start: 2020-01-01 | End: 2021-01-01

## 2020-01-01 RX ORDER — SODIUM CHLORIDE 0.9 % (FLUSH) 0.9 %
10 SYRINGE (ML) INJECTION 2 TIMES DAILY
Status: DISCONTINUED | OUTPATIENT
Start: 2020-01-01 | End: 2021-01-01 | Stop reason: HOSPADM

## 2020-01-01 RX ORDER — MAGNESIUM HYDROXIDE 1200 MG/15ML
LIQUID ORAL CONTINUOUS PRN
Status: COMPLETED | OUTPATIENT
Start: 2020-01-01 | End: 2020-01-01

## 2020-01-01 RX ORDER — ROCURONIUM BROMIDE 10 MG/ML
INJECTION, SOLUTION INTRAVENOUS PRN
Status: DISCONTINUED | OUTPATIENT
Start: 2020-01-01 | End: 2020-01-01 | Stop reason: SDUPTHER

## 2020-01-01 RX ORDER — FENTANYL CITRATE 50 UG/ML
INJECTION, SOLUTION INTRAMUSCULAR; INTRAVENOUS PRN
Status: DISCONTINUED | OUTPATIENT
Start: 2020-01-01 | End: 2020-01-01 | Stop reason: SDUPTHER

## 2020-01-01 RX ORDER — POTASSIUM CHLORIDE 7.45 MG/ML
10 INJECTION INTRAVENOUS PRN
Status: DISCONTINUED | OUTPATIENT
Start: 2020-01-01 | End: 2021-01-01 | Stop reason: HOSPADM

## 2020-01-01 RX ORDER — FAMOTIDINE 40 MG/1
40 TABLET, FILM COATED ORAL DAILY
COMMUNITY

## 2020-01-01 RX ORDER — 0.9 % SODIUM CHLORIDE 0.9 %
500 INTRAVENOUS SOLUTION INTRAVENOUS
Status: DISCONTINUED | OUTPATIENT
Start: 2020-01-01 | End: 2020-01-01 | Stop reason: HOSPADM

## 2020-01-01 RX ORDER — OYSTER SHELL CALCIUM WITH VITAMIN D 500; 200 MG/1; [IU]/1
2 TABLET, FILM COATED ORAL DAILY
Status: DISCONTINUED | OUTPATIENT
Start: 2020-01-01 | End: 2021-01-01

## 2020-01-01 RX ORDER — PROMETHAZINE HYDROCHLORIDE 25 MG/ML
6.25 INJECTION, SOLUTION INTRAMUSCULAR; INTRAVENOUS
Status: DISCONTINUED | OUTPATIENT
Start: 2020-01-01 | End: 2020-01-01 | Stop reason: HOSPADM

## 2020-01-01 RX ORDER — DIPHENHYDRAMINE HYDROCHLORIDE 50 MG/ML
50 INJECTION INTRAMUSCULAR; INTRAVENOUS ONCE
Status: CANCELLED | OUTPATIENT
Start: 2020-01-01

## 2020-01-01 RX ORDER — ONDANSETRON 2 MG/ML
4 INJECTION INTRAMUSCULAR; INTRAVENOUS
Status: COMPLETED | OUTPATIENT
Start: 2020-01-01 | End: 2020-01-01

## 2020-01-01 RX ORDER — ACETAMINOPHEN 650 MG/1
650 SUPPOSITORY RECTAL EVERY 6 HOURS PRN
Status: DISCONTINUED | OUTPATIENT
Start: 2020-01-01 | End: 2021-01-01 | Stop reason: HOSPADM

## 2020-01-01 RX ORDER — SODIUM CHLORIDE 0.9 % (FLUSH) 0.9 %
10 SYRINGE (ML) INJECTION PRN
Status: DISCONTINUED | OUTPATIENT
Start: 2020-01-01 | End: 2020-01-01

## 2020-01-01 RX ORDER — FENTANYL CITRATE 50 UG/ML
50 INJECTION, SOLUTION INTRAMUSCULAR; INTRAVENOUS EVERY 5 MIN PRN
Status: DISCONTINUED | OUTPATIENT
Start: 2020-01-01 | End: 2020-01-01 | Stop reason: HOSPADM

## 2020-01-01 RX ORDER — LISINOPRIL AND HYDROCHLOROTHIAZIDE 20; 12.5 MG/1; MG/1
1 TABLET ORAL DAILY
Status: DISCONTINUED | OUTPATIENT
Start: 2020-01-01 | End: 2021-01-01 | Stop reason: HOSPADM

## 2020-01-01 RX ORDER — SODIUM CHLORIDE 0.9 % (FLUSH) 0.9 %
10 SYRINGE (ML) INJECTION PRN
Status: DISCONTINUED | OUTPATIENT
Start: 2020-01-01 | End: 2021-01-01 | Stop reason: HOSPADM

## 2020-01-01 RX ORDER — FENTANYL CITRATE 50 UG/ML
50 INJECTION, SOLUTION INTRAMUSCULAR; INTRAVENOUS
Status: DISCONTINUED | OUTPATIENT
Start: 2020-01-01 | End: 2020-01-01

## 2020-01-01 RX ORDER — MORPHINE SULFATE 4 MG/ML
4 INJECTION, SOLUTION INTRAMUSCULAR; INTRAVENOUS
Status: DISCONTINUED | OUTPATIENT
Start: 2020-01-01 | End: 2021-01-01 | Stop reason: HOSPADM

## 2020-01-01 RX ORDER — ONDANSETRON 4 MG/1
4 TABLET, ORALLY DISINTEGRATING ORAL EVERY 6 HOURS
Qty: 20 TABLET | Refills: 0 | Status: SHIPPED | OUTPATIENT
Start: 2020-01-01

## 2020-01-01 RX ORDER — EPINEPHRINE 1 MG/ML
0.3 INJECTION, SOLUTION, CONCENTRATE INTRAVENOUS PRN
Status: CANCELLED | OUTPATIENT
Start: 2020-01-01

## 2020-01-01 RX ORDER — METHYLPREDNISOLONE SODIUM SUCCINATE 125 MG/2ML
125 INJECTION, POWDER, LYOPHILIZED, FOR SOLUTION INTRAMUSCULAR; INTRAVENOUS ONCE
Status: CANCELLED | OUTPATIENT
Start: 2020-01-01

## 2020-01-01 RX ORDER — ONDANSETRON 2 MG/ML
4 INJECTION INTRAMUSCULAR; INTRAVENOUS EVERY 6 HOURS PRN
Status: CANCELLED | OUTPATIENT
Start: 2020-01-01

## 2020-01-01 RX ORDER — SODIUM CHLORIDE 9 MG/ML
INJECTION, SOLUTION INTRAVENOUS CONTINUOUS
Status: CANCELLED | OUTPATIENT
Start: 2020-01-01

## 2020-01-01 RX ORDER — POTASSIUM CHLORIDE 20 MEQ/1
40 TABLET, EXTENDED RELEASE ORAL PRN
Status: DISCONTINUED | OUTPATIENT
Start: 2020-01-01 | End: 2021-01-01 | Stop reason: HOSPADM

## 2020-01-01 RX ORDER — MEPERIDINE HYDROCHLORIDE 25 MG/ML
12.5 INJECTION INTRAMUSCULAR; INTRAVENOUS; SUBCUTANEOUS EVERY 5 MIN PRN
Status: DISCONTINUED | OUTPATIENT
Start: 2020-01-01 | End: 2020-01-01 | Stop reason: HOSPADM

## 2020-01-01 RX ORDER — MORPHINE SULFATE 4 MG/ML
4 INJECTION, SOLUTION INTRAMUSCULAR; INTRAVENOUS EVERY 30 MIN PRN
Status: DISCONTINUED | OUTPATIENT
Start: 2020-01-01 | End: 2020-01-01

## 2020-01-01 RX ORDER — ONDANSETRON 2 MG/ML
4 INJECTION INTRAMUSCULAR; INTRAVENOUS ONCE
Status: COMPLETED | OUTPATIENT
Start: 2020-01-01 | End: 2020-01-01

## 2020-01-01 RX ORDER — ACETAMINOPHEN 325 MG/1
650 TABLET ORAL EVERY 6 HOURS PRN
Status: DISCONTINUED | OUTPATIENT
Start: 2020-01-01 | End: 2021-01-01 | Stop reason: HOSPADM

## 2020-01-01 RX ORDER — DIPHENHYDRAMINE HYDROCHLORIDE 50 MG/ML
50 INJECTION INTRAMUSCULAR; INTRAVENOUS ONCE
Status: CANCELLED | OUTPATIENT
Start: 2020-01-01 | End: 2020-01-01

## 2020-01-01 RX ORDER — OXYCODONE HYDROCHLORIDE AND ACETAMINOPHEN 5; 325 MG/1; MG/1
1 TABLET ORAL EVERY 4 HOURS PRN
Status: DISCONTINUED | OUTPATIENT
Start: 2020-01-01 | End: 2021-01-01 | Stop reason: HOSPADM

## 2020-01-01 RX ORDER — OMEGA-3-ACID ETHYL ESTERS 1 G/1
1000 CAPSULE, LIQUID FILLED ORAL DAILY
Status: DISCONTINUED | OUTPATIENT
Start: 2020-01-01 | End: 2021-01-01 | Stop reason: HOSPADM

## 2020-01-01 RX ORDER — DICYCLOMINE HYDROCHLORIDE 10 MG/ML
20 INJECTION INTRAMUSCULAR ONCE
Status: DISCONTINUED | OUTPATIENT
Start: 2020-01-01 | End: 2020-01-01

## 2020-01-01 RX ORDER — ONDANSETRON 2 MG/ML
4 INJECTION INTRAMUSCULAR; INTRAVENOUS EVERY 30 MIN PRN
Status: DISCONTINUED | OUTPATIENT
Start: 2020-01-01 | End: 2021-01-01

## 2020-01-01 RX ORDER — ZOLEDRONIC ACID 5 MG/100ML
5 INJECTION, SOLUTION INTRAVENOUS ONCE
Status: CANCELLED | OUTPATIENT
Start: 2020-01-01

## 2020-01-01 RX ORDER — LABETALOL HYDROCHLORIDE 5 MG/ML
5 INJECTION, SOLUTION INTRAVENOUS EVERY 10 MIN PRN
Status: DISCONTINUED | OUTPATIENT
Start: 2020-01-01 | End: 2020-01-01 | Stop reason: HOSPADM

## 2020-01-01 RX ORDER — PANTOPRAZOLE SODIUM 40 MG/10ML
40 INJECTION, POWDER, LYOPHILIZED, FOR SOLUTION INTRAVENOUS DAILY
Status: DISCONTINUED | OUTPATIENT
Start: 2020-01-01 | End: 2021-01-01

## 2020-01-01 RX ORDER — HYDROCODONE BITARTRATE AND ACETAMINOPHEN 5; 325 MG/1; MG/1
2 TABLET ORAL PRN
Status: DISCONTINUED | OUTPATIENT
Start: 2020-01-01 | End: 2020-01-01 | Stop reason: HOSPADM

## 2020-01-01 RX ORDER — MINOCYCLINE HYDROCHLORIDE 50 MG/1
50 CAPSULE ORAL NIGHTLY
Status: DISCONTINUED | OUTPATIENT
Start: 2020-01-01 | End: 2021-01-01

## 2020-01-01 RX ORDER — MORPHINE SULFATE 4 MG/ML
4 INJECTION, SOLUTION INTRAMUSCULAR; INTRAVENOUS ONCE
Status: COMPLETED | OUTPATIENT
Start: 2020-01-01 | End: 2020-01-01

## 2020-01-01 RX ORDER — HYDROMORPHONE HCL 110MG/55ML
0.5 PATIENT CONTROLLED ANALGESIA SYRINGE INTRAVENOUS EVERY 5 MIN PRN
Status: DISCONTINUED | OUTPATIENT
Start: 2020-01-01 | End: 2020-01-01 | Stop reason: HOSPADM

## 2020-01-01 RX ORDER — ASPIRIN 81 MG/1
81 TABLET, CHEWABLE ORAL 2 TIMES DAILY
Status: DISCONTINUED | OUTPATIENT
Start: 2020-01-01 | End: 2020-01-01

## 2020-01-01 RX ORDER — DIPHENHYDRAMINE HYDROCHLORIDE 50 MG/ML
12.5 INJECTION INTRAMUSCULAR; INTRAVENOUS
Status: DISCONTINUED | OUTPATIENT
Start: 2020-01-01 | End: 2020-01-01 | Stop reason: HOSPADM

## 2020-01-01 RX ORDER — SODIUM CHLORIDE 0.9 % (FLUSH) 0.9 %
10 SYRINGE (ML) INJECTION EVERY 12 HOURS SCHEDULED
Status: DISCONTINUED | OUTPATIENT
Start: 2020-01-01 | End: 2021-01-01 | Stop reason: HOSPADM

## 2020-01-01 RX ORDER — METHYLPREDNISOLONE SODIUM SUCCINATE 125 MG/2ML
125 INJECTION, POWDER, LYOPHILIZED, FOR SOLUTION INTRAMUSCULAR; INTRAVENOUS ONCE
Status: CANCELLED | OUTPATIENT
Start: 2020-01-01 | End: 2020-01-01

## 2020-01-01 RX ORDER — SODIUM CHLORIDE 0.9 % (FLUSH) 0.9 %
10 SYRINGE (ML) INJECTION PRN
Status: CANCELLED | OUTPATIENT
Start: 2020-01-01

## 2020-01-01 RX ORDER — DOCUSATE SODIUM 100 MG/1
100 CAPSULE, LIQUID FILLED ORAL 2 TIMES DAILY
Status: DISCONTINUED | OUTPATIENT
Start: 2020-01-01 | End: 2021-01-01

## 2020-01-01 RX ORDER — ZOLEDRONIC ACID 5 MG/100ML
5 INJECTION, SOLUTION INTRAVENOUS ONCE
Status: DISCONTINUED | OUTPATIENT
Start: 2020-01-01 | End: 2020-01-01

## 2020-01-01 RX ORDER — MORPHINE SULFATE 4 MG/ML
4 INJECTION, SOLUTION INTRAMUSCULAR; INTRAVENOUS EVERY 4 HOURS PRN
Status: DISCONTINUED | OUTPATIENT
Start: 2020-01-01 | End: 2020-01-01

## 2020-01-01 RX ORDER — PROMETHAZINE HYDROCHLORIDE 25 MG/1
12.5 TABLET ORAL EVERY 6 HOURS PRN
Status: DISCONTINUED | OUTPATIENT
Start: 2020-01-01 | End: 2021-01-01

## 2020-01-01 RX ORDER — ZOLEDRONIC ACID 5 MG/100ML
5 INJECTION, SOLUTION INTRAVENOUS ONCE
Status: CANCELLED | OUTPATIENT
Start: 2020-01-01 | End: 2020-01-01

## 2020-01-01 RX ORDER — ASPIRIN 81 MG/1
81 TABLET, CHEWABLE ORAL 2 TIMES DAILY
Status: DISCONTINUED | OUTPATIENT
Start: 2020-01-01 | End: 2021-01-01

## 2020-01-01 RX ORDER — PROPOFOL 10 MG/ML
INJECTION, EMULSION INTRAVENOUS PRN
Status: DISCONTINUED | OUTPATIENT
Start: 2020-01-01 | End: 2020-01-01 | Stop reason: SDUPTHER

## 2020-01-01 RX ORDER — HYDROXYCHLOROQUINE SULFATE 200 MG/1
200 TABLET, FILM COATED ORAL 2 TIMES DAILY
Status: DISCONTINUED | OUTPATIENT
Start: 2020-01-01 | End: 2021-01-01

## 2020-01-01 RX ORDER — LIDOCAINE HYDROCHLORIDE 20 MG/ML
INJECTION, SOLUTION INTRAVENOUS PRN
Status: DISCONTINUED | OUTPATIENT
Start: 2020-01-01 | End: 2020-01-01 | Stop reason: SDUPTHER

## 2020-01-01 RX ORDER — POLYETHYLENE GLYCOL 3350 17 G/17G
17 POWDER, FOR SOLUTION ORAL DAILY PRN
Status: DISCONTINUED | OUTPATIENT
Start: 2020-01-01 | End: 2021-01-01 | Stop reason: HOSPADM

## 2020-01-01 RX ORDER — ONDANSETRON 2 MG/ML
INJECTION INTRAMUSCULAR; INTRAVENOUS PRN
Status: DISCONTINUED | OUTPATIENT
Start: 2020-01-01 | End: 2020-01-01 | Stop reason: SDUPTHER

## 2020-01-01 RX ORDER — DEXAMETHASONE SODIUM PHOSPHATE 4 MG/ML
INJECTION, SOLUTION INTRA-ARTICULAR; INTRALESIONAL; INTRAMUSCULAR; INTRAVENOUS; SOFT TISSUE PRN
Status: DISCONTINUED | OUTPATIENT
Start: 2020-01-01 | End: 2020-01-01 | Stop reason: SDUPTHER

## 2020-01-01 RX ORDER — ONDANSETRON 2 MG/ML
4 INJECTION INTRAMUSCULAR; INTRAVENOUS EVERY 6 HOURS PRN
Status: DISCONTINUED | OUTPATIENT
Start: 2020-01-01 | End: 2021-01-01

## 2020-01-01 RX ORDER — HYDRALAZINE HYDROCHLORIDE 20 MG/ML
5 INJECTION INTRAMUSCULAR; INTRAVENOUS EVERY 10 MIN PRN
Status: DISCONTINUED | OUTPATIENT
Start: 2020-01-01 | End: 2020-01-01 | Stop reason: HOSPADM

## 2020-01-01 RX ADMIN — FENTANYL CITRATE 50 MCG: 50 INJECTION INTRAMUSCULAR; INTRAVENOUS at 12:21

## 2020-01-01 RX ADMIN — ATORVASTATIN CALCIUM 10 MG: 10 TABLET, FILM COATED ORAL at 20:24

## 2020-01-01 RX ADMIN — MINOCYCLINE HYDROCHLORIDE 50 MG: 50 CAPSULE ORAL at 21:32

## 2020-01-01 RX ADMIN — FENTANYL CITRATE 50 MCG: 50 INJECTION INTRAMUSCULAR; INTRAVENOUS at 11:50

## 2020-01-01 RX ADMIN — MORPHINE SULFATE 4 MG: 4 INJECTION, SOLUTION INTRAMUSCULAR; INTRAVENOUS at 12:06

## 2020-01-01 RX ADMIN — ENOXAPARIN SODIUM 40 MG: 40 INJECTION SUBCUTANEOUS at 11:29

## 2020-01-01 RX ADMIN — HYDROXYCHLOROQUINE SULFATE 200 MG: 200 TABLET, FILM COATED ORAL at 21:32

## 2020-01-01 RX ADMIN — MORPHINE SULFATE 4 MG: 4 INJECTION, SOLUTION INTRAMUSCULAR; INTRAVENOUS at 20:24

## 2020-01-01 RX ADMIN — SODIUM CHLORIDE: 9 INJECTION, SOLUTION INTRAVENOUS at 11:30

## 2020-01-01 RX ADMIN — ASPIRIN 81 MG CHEWABLE TABLET 81 MG: 81 TABLET CHEWABLE at 20:56

## 2020-01-01 RX ADMIN — DOCUSATE SODIUM 100 MG: 100 CAPSULE, LIQUID FILLED ORAL at 20:56

## 2020-01-01 RX ADMIN — MORPHINE SULFATE 4 MG: 4 INJECTION, SOLUTION INTRAMUSCULAR; INTRAVENOUS at 15:06

## 2020-01-01 RX ADMIN — PANTOPRAZOLE SODIUM 40 MG: 40 INJECTION, POWDER, FOR SOLUTION INTRAVENOUS at 12:45

## 2020-01-01 RX ADMIN — PANTOPRAZOLE SODIUM 40 MG: 40 INJECTION, POWDER, FOR SOLUTION INTRAVENOUS at 11:30

## 2020-01-01 RX ADMIN — FENTANYL CITRATE 50 MCG: 50 INJECTION INTRAMUSCULAR; INTRAVENOUS at 12:09

## 2020-01-01 RX ADMIN — LIDOCAINE HYDROCHLORIDE 100 MG: 20 INJECTION, SOLUTION INTRAVENOUS at 10:18

## 2020-01-01 RX ADMIN — ONDANSETRON 4 MG: 2 INJECTION INTRAMUSCULAR; INTRAVENOUS at 10:18

## 2020-01-01 RX ADMIN — HYDROXYCHLOROQUINE SULFATE 200 MG: 200 TABLET, FILM COATED ORAL at 21:40

## 2020-01-01 RX ADMIN — LISINOPRIL AND HYDROCHLOROTHIAZIDE 1 TABLET: 12.5; 2 TABLET ORAL at 11:29

## 2020-01-01 RX ADMIN — MORPHINE SULFATE 4 MG: 4 INJECTION, SOLUTION INTRAMUSCULAR; INTRAVENOUS at 19:21

## 2020-01-01 RX ADMIN — ONDANSETRON 4 MG: 2 INJECTION INTRAMUSCULAR; INTRAVENOUS at 18:08

## 2020-01-01 RX ADMIN — OMEGA-3-ACID ETHYL ESTERS 1000 MG: 1 CAPSULE, LIQUID FILLED ORAL at 09:35

## 2020-01-01 RX ADMIN — HYDROXYCHLOROQUINE SULFATE 200 MG: 200 TABLET, FILM COATED ORAL at 11:29

## 2020-01-01 RX ADMIN — MORPHINE SULFATE 4 MG: 4 INJECTION, SOLUTION INTRAMUSCULAR; INTRAVENOUS at 02:21

## 2020-01-01 RX ADMIN — PIPERACILLIN AND TAZOBACTAM 3.38 G: 3; .375 INJECTION, POWDER, FOR SOLUTION INTRAVENOUS at 08:13

## 2020-01-01 RX ADMIN — Medication 1000 UNITS: at 09:35

## 2020-01-01 RX ADMIN — SODIUM CHLORIDE, PRESERVATIVE FREE 10 ML: 5 INJECTION INTRAVENOUS at 11:30

## 2020-01-01 RX ADMIN — Medication 1000 UNITS: at 20:56

## 2020-01-01 RX ADMIN — SODIUM CHLORIDE, PRESERVATIVE FREE 10 ML: 5 INJECTION INTRAVENOUS at 10:18

## 2020-01-01 RX ADMIN — MORPHINE SULFATE 4 MG: 4 INJECTION, SOLUTION INTRAMUSCULAR; INTRAVENOUS at 07:19

## 2020-01-01 RX ADMIN — MORPHINE SULFATE 4 MG: 4 INJECTION, SOLUTION INTRAMUSCULAR; INTRAVENOUS at 07:32

## 2020-01-01 RX ADMIN — DEXAMETHASONE SODIUM PHOSPHATE 4 MG: 4 INJECTION, SOLUTION INTRAMUSCULAR; INTRAVENOUS at 10:25

## 2020-01-01 RX ADMIN — OXYCODONE HYDROCHLORIDE AND ACETAMINOPHEN 1 TABLET: 5; 325 TABLET ORAL at 15:19

## 2020-01-01 RX ADMIN — FENTANYL CITRATE 50 MCG: 50 INJECTION INTRAMUSCULAR; INTRAVENOUS at 12:49

## 2020-01-01 RX ADMIN — METRONIDAZOLE 500 MG: 500 INJECTION, SOLUTION INTRAVENOUS at 10:40

## 2020-01-01 RX ADMIN — ONDANSETRON 4 MG: 2 INJECTION INTRAMUSCULAR; INTRAVENOUS at 13:40

## 2020-01-01 RX ADMIN — ENOXAPARIN SODIUM 40 MG: 40 INJECTION SUBCUTANEOUS at 09:35

## 2020-01-01 RX ADMIN — MORPHINE SULFATE 4 MG: 4 INJECTION, SOLUTION INTRAMUSCULAR; INTRAVENOUS at 10:18

## 2020-01-01 RX ADMIN — FENTANYL CITRATE 50 MCG: 50 INJECTION INTRAMUSCULAR; INTRAVENOUS at 03:19

## 2020-01-01 RX ADMIN — OXYCODONE HYDROCHLORIDE AND ACETAMINOPHEN 1 TABLET: 5; 325 TABLET ORAL at 23:31

## 2020-01-01 RX ADMIN — LISINOPRIL AND HYDROCHLOROTHIAZIDE 1 TABLET: 12.5; 2 TABLET ORAL at 09:35

## 2020-01-01 RX ADMIN — ATORVASTATIN CALCIUM 10 MG: 10 TABLET, FILM COATED ORAL at 21:40

## 2020-01-01 RX ADMIN — Medication 1000 UNITS: at 20:24

## 2020-01-01 RX ADMIN — MORPHINE SULFATE 4 MG: 4 INJECTION, SOLUTION INTRAMUSCULAR; INTRAVENOUS at 17:24

## 2020-01-01 RX ADMIN — MORPHINE SULFATE 4 MG: 4 INJECTION, SOLUTION INTRAMUSCULAR; INTRAVENOUS at 14:07

## 2020-01-01 RX ADMIN — SODIUM CHLORIDE, PRESERVATIVE FREE 10 ML: 5 INJECTION INTRAVENOUS at 21:36

## 2020-01-01 RX ADMIN — MORPHINE SULFATE 4 MG: 4 INJECTION, SOLUTION INTRAMUSCULAR; INTRAVENOUS at 20:56

## 2020-01-01 RX ADMIN — SODIUM CHLORIDE: 9 INJECTION, SOLUTION INTRAVENOUS at 23:23

## 2020-01-01 RX ADMIN — CEFAZOLIN 2 G: 10 INJECTION, POWDER, FOR SOLUTION INTRAVENOUS at 10:28

## 2020-01-01 RX ADMIN — HYDROXYCHLOROQUINE SULFATE 200 MG: 200 TABLET, FILM COATED ORAL at 09:36

## 2020-01-01 RX ADMIN — SODIUM CHLORIDE: 9 INJECTION, SOLUTION INTRAVENOUS at 21:40

## 2020-01-01 RX ADMIN — ONDANSETRON 4 MG: 2 INJECTION INTRAMUSCULAR; INTRAVENOUS at 12:06

## 2020-01-01 RX ADMIN — PROPOFOL 100 MG: 10 INJECTION, EMULSION INTRAVENOUS at 10:18

## 2020-01-01 RX ADMIN — FENTANYL CITRATE 50 MCG: 50 INJECTION INTRAMUSCULAR; INTRAVENOUS at 10:16

## 2020-01-01 RX ADMIN — PANTOPRAZOLE SODIUM 40 MG: 40 INJECTION, POWDER, FOR SOLUTION INTRAVENOUS at 09:35

## 2020-01-01 RX ADMIN — MORPHINE SULFATE 4 MG: 4 INJECTION, SOLUTION INTRAMUSCULAR; INTRAVENOUS at 23:16

## 2020-01-01 RX ADMIN — ROCURONIUM BROMIDE 10 MG: 10 INJECTION INTRAVENOUS at 11:33

## 2020-01-01 RX ADMIN — MORPHINE SULFATE 4 MG: 4 INJECTION, SOLUTION INTRAMUSCULAR; INTRAVENOUS at 08:09

## 2020-01-01 RX ADMIN — SODIUM CHLORIDE 1000 ML: 9 INJECTION, SOLUTION INTRAVENOUS at 03:06

## 2020-01-01 RX ADMIN — SODIUM CHLORIDE: 9 INJECTION, SOLUTION INTRAVENOUS at 09:33

## 2020-01-01 RX ADMIN — SODIUM CHLORIDE, PRESERVATIVE FREE 10 ML: 5 INJECTION INTRAVENOUS at 09:41

## 2020-01-01 RX ADMIN — SUGAMMADEX 200 MG: 100 INJECTION, SOLUTION INTRAVENOUS at 12:06

## 2020-01-01 RX ADMIN — IOPAMIDOL 75 ML: 755 INJECTION, SOLUTION INTRAVENOUS at 04:37

## 2020-01-01 RX ADMIN — MORPHINE SULFATE 4 MG: 4 INJECTION, SOLUTION INTRAMUSCULAR; INTRAVENOUS at 17:20

## 2020-01-01 RX ADMIN — ROCURONIUM BROMIDE 50 MG: 10 INJECTION INTRAVENOUS at 10:18

## 2020-01-01 RX ADMIN — ONDANSETRON 4 MG: 2 INJECTION INTRAMUSCULAR; INTRAVENOUS at 03:07

## 2020-01-01 RX ADMIN — ROCURONIUM BROMIDE 10 MG: 10 INJECTION INTRAVENOUS at 11:00

## 2020-01-01 RX ADMIN — ONDANSETRON 4 MG: 2 INJECTION INTRAMUSCULAR; INTRAVENOUS at 08:04

## 2020-01-01 RX ADMIN — ATORVASTATIN CALCIUM 10 MG: 10 TABLET, FILM COATED ORAL at 20:56

## 2020-01-01 RX ADMIN — SODIUM CHLORIDE: 9 INJECTION, SOLUTION INTRAVENOUS at 07:19

## 2020-01-01 RX ADMIN — MORPHINE SULFATE 4 MG: 4 INJECTION, SOLUTION INTRAMUSCULAR; INTRAVENOUS at 23:25

## 2020-01-01 RX ADMIN — ASPIRIN 81 MG CHEWABLE TABLET 81 MG: 81 TABLET CHEWABLE at 20:24

## 2020-01-01 RX ADMIN — ASPIRIN 81 MG CHEWABLE TABLET 81 MG: 81 TABLET CHEWABLE at 09:35

## 2020-01-01 RX ADMIN — DOCUSATE SODIUM 100 MG: 100 CAPSULE, LIQUID FILLED ORAL at 12:07

## 2020-01-01 RX ADMIN — FENTANYL CITRATE 50 MCG: 50 INJECTION INTRAMUSCULAR; INTRAVENOUS at 11:38

## 2020-01-01 RX ADMIN — SODIUM CHLORIDE: 9 INJECTION, SOLUTION INTRAVENOUS at 13:14

## 2020-01-01 RX ADMIN — FENTANYL CITRATE 50 MCG: 50 INJECTION INTRAMUSCULAR; INTRAVENOUS at 10:36

## 2020-01-01 RX ADMIN — OYSTER SHELL CALCIUM WITH VITAMIN D 2 TABLET: 500; 200 TABLET, FILM COATED ORAL at 12:07

## 2020-01-01 ASSESSMENT — PULMONARY FUNCTION TESTS
PIF_VALUE: 19
PIF_VALUE: 0
PIF_VALUE: 1
PIF_VALUE: 5
PIF_VALUE: 19
PIF_VALUE: 18
PIF_VALUE: 17
PIF_VALUE: 19
PIF_VALUE: 19
PIF_VALUE: 17
PIF_VALUE: 19
PIF_VALUE: 18
PIF_VALUE: 19
PIF_VALUE: 17
PIF_VALUE: 18
PIF_VALUE: 0
PIF_VALUE: 18
PIF_VALUE: 0
PIF_VALUE: 19
PIF_VALUE: 1
PIF_VALUE: 18
PIF_VALUE: 19
PIF_VALUE: 17
PIF_VALUE: 19
PIF_VALUE: 18
PIF_VALUE: 19
PIF_VALUE: 19
PIF_VALUE: 17
PIF_VALUE: 18
PIF_VALUE: 19
PIF_VALUE: 18
PIF_VALUE: 19
PIF_VALUE: 20
PIF_VALUE: 18
PIF_VALUE: 18
PIF_VALUE: 0
PIF_VALUE: 11
PIF_VALUE: 20
PIF_VALUE: 16
PIF_VALUE: 19
PIF_VALUE: 16
PIF_VALUE: 18
PIF_VALUE: 19
PIF_VALUE: 20
PIF_VALUE: 0
PIF_VALUE: 19
PIF_VALUE: 17
PIF_VALUE: 19
PIF_VALUE: 4
PIF_VALUE: 20
PIF_VALUE: 19
PIF_VALUE: 0
PIF_VALUE: 19
PIF_VALUE: 2
PIF_VALUE: 19
PIF_VALUE: 11
PIF_VALUE: 17
PIF_VALUE: 22
PIF_VALUE: 18
PIF_VALUE: 18
PIF_VALUE: 19
PIF_VALUE: 17
PIF_VALUE: 19
PIF_VALUE: 3
PIF_VALUE: 18
PIF_VALUE: 18
PIF_VALUE: 19
PIF_VALUE: 19
PIF_VALUE: 24
PIF_VALUE: 20
PIF_VALUE: 20
PIF_VALUE: 18
PIF_VALUE: 0
PIF_VALUE: 21
PIF_VALUE: 16
PIF_VALUE: 18
PIF_VALUE: 17
PIF_VALUE: 19
PIF_VALUE: 18
PIF_VALUE: 19
PIF_VALUE: 18
PIF_VALUE: 17
PIF_VALUE: 18
PIF_VALUE: 19
PIF_VALUE: 18
PIF_VALUE: 17
PIF_VALUE: 18
PIF_VALUE: 20
PIF_VALUE: 18
PIF_VALUE: 17
PIF_VALUE: 20
PIF_VALUE: 17
PIF_VALUE: 18
PIF_VALUE: 16
PIF_VALUE: 19
PIF_VALUE: 17
PIF_VALUE: 17
PIF_VALUE: 19
PIF_VALUE: 17
PIF_VALUE: 17
PIF_VALUE: 19
PIF_VALUE: 16
PIF_VALUE: 19
PIF_VALUE: 17
PIF_VALUE: 1
PIF_VALUE: 19
PIF_VALUE: 3
PIF_VALUE: 16
PIF_VALUE: 19
PIF_VALUE: 19
PIF_VALUE: 10
PIF_VALUE: 18
PIF_VALUE: 19
PIF_VALUE: 20
PIF_VALUE: 18
PIF_VALUE: 19
PIF_VALUE: 24
PIF_VALUE: 17
PIF_VALUE: 26
PIF_VALUE: 18

## 2020-01-01 ASSESSMENT — PAIN DESCRIPTION - PROGRESSION
CLINICAL_PROGRESSION: GRADUALLY WORSENING

## 2020-01-01 ASSESSMENT — PAIN DESCRIPTION - ORIENTATION
ORIENTATION: LEFT

## 2020-01-01 ASSESSMENT — PAIN SCALES - GENERAL
PAINLEVEL_OUTOF10: 9
PAINLEVEL_OUTOF10: 7
PAINLEVEL_OUTOF10: 7
PAINLEVEL_OUTOF10: 8
PAINLEVEL_OUTOF10: 0
PAINLEVEL_OUTOF10: 7
PAINLEVEL_OUTOF10: 7
PAINLEVEL_OUTOF10: 9
PAINLEVEL_OUTOF10: 6
PAINLEVEL_OUTOF10: 8
PAINLEVEL_OUTOF10: 0
PAINLEVEL_OUTOF10: 10
PAINLEVEL_OUTOF10: 7

## 2020-01-01 ASSESSMENT — PAIN DESCRIPTION - LOCATION
LOCATION: ABDOMEN

## 2020-01-01 ASSESSMENT — PAIN DESCRIPTION - FREQUENCY
FREQUENCY: CONTINUOUS

## 2020-01-01 ASSESSMENT — PAIN DESCRIPTION - DESCRIPTORS
DESCRIPTORS: CRAMPING;TIGHTNESS
DESCRIPTORS: SHARP
DESCRIPTORS: DISCOMFORT

## 2020-01-01 ASSESSMENT — PAIN DESCRIPTION - ONSET
ONSET: ON-GOING

## 2020-01-01 ASSESSMENT — PAIN DESCRIPTION - PAIN TYPE
TYPE: SURGICAL PAIN

## 2020-01-01 ASSESSMENT — PAIN - FUNCTIONAL ASSESSMENT
PAIN_FUNCTIONAL_ASSESSMENT: PREVENTS OR INTERFERES WITH MANY ACTIVE NOT PASSIVE ACTIVITIES
PAIN_FUNCTIONAL_ASSESSMENT: PREVENTS OR INTERFERES SOME ACTIVE ACTIVITIES AND ADLS

## 2020-12-29 PROBLEM — R10.9 ABDOMINAL PAIN: Status: ACTIVE | Noted: 2020-01-01

## 2020-12-29 NOTE — PROGRESS NOTES
Physical Therapy  Triage hold-patient ambulating to BR in ER, was independent prior to admission, rec nursing mobility, if specific mobility concern arises please document.

## 2020-12-29 NOTE — ED PROVIDER NOTES
eMERGENCY dEPARTMENT eNCOUnter      [address] [facility]    Chief Complaint   Patient presents with    Abdominal Pain    Emesis       HPI    Chintan Torres is a 77 y.o. female who presents with abdominal pain. Onset:  Around 6pm.  Context:  Spontaneous onset. Pain has been intermittent since onset. Location:  Lower abdomen. Radiation:  None. Character:  Cramping, \"like contractions\". Aggravating factors:  None. Alleviating factors:  None. Severity is a(n) 10 on a scale of 0-10. Associated symptoms:  Nausea, vomiting x 4. Denies any diarrhea. Denies fever or chills. Denies chest pain, sob, cough/congestion. REVIEW OF SYSTEMS    See HPI for further details. Review of systems otherwise negative. Constitutional:  Denies fever. HENT:  Denies headache. Respiratory:  Denies any shortness of breath. Cardiovascular:  Denies chest pain. GI:  + abdominal pain, + nausea, + vomiting, denies diarrhea, denies constipation. :  Denies urinary symptoms. Musculoskeletal:  Denies any extremity pain. Back:  Denies back pain. Integument:  Denies any skin changes. Lymphatic:  Denies lymphadenopathy.     PAST MEDICAL HISTORY    Past Medical History:   Diagnosis Date    Arthritis     Dry eye     GERD (gastroesophageal reflux disease)     Hiatal hernia     Hyperlipidemia     Hypertension     Osteopenia     Osteoporosis     TIA (transient ischemic attack)        SURGICAL HISTORY    Past Surgical History:   Procedure Laterality Date    BACK SURGERY      scoliosis    CARPAL TUNNEL RELEASE      CYST REMOVAL      ESOPHAGEAL DILATATION      HYSTERECTOMY      Total    JOINT REPLACEMENT  09/26/1916    knee L       CURRENT MEDICATIONS    Current Outpatient Rx   Medication Sig Dispense Refill    folic acid (FOLVITE) 1 MG tablet Take 1 mg by mouth daily      methotrexate (RHEUMATREX) 2.5 MG chemo tablet Take 10 mg by mouth once a week      raNITIdine HCl (RANITIDINE 150 MAX STRENGTH PO) Take 150 mg by mouth      aspirin 81 MG tablet Take 162 mg by mouth daily      Flaxseed, Linseed, (FLAXSEED OIL) 1200 MG CAPS Take 2,400 mg by mouth daily      ferrous sulfate 325 (65 Fe) MG tablet Take 325 mg by mouth daily (with breakfast)      lisinopril-hydrochlorothiazide (PRINZIDE;ZESTORETIC) 20-12.5 MG per tablet Take 1 tablet by mouth daily 90 tablet 1    atorvastatin (LIPITOR) 10 MG tablet Take 1 tablet by mouth nightly 90 tablet 1    vitamin D (CHOLECALCIFEROL) 1000 UNIT TABS tablet Take 1,000 Units by mouth 2 times daily      zoledronic acid (RECLAST) 5 MG/100ML SOLN Infuse 100 mLs intravenously once for 1 dose To be set up through infusion unit at [facility]. Orders for infusion per protocol 100 mL 0    minocycline (MINOCIN;DYNACIN) 50 MG capsule Take 50 mg by mouth daily      fluticasone (FLONASE) 50 MCG/ACT nasal spray 2 sprays by Nasal route daily 3 Bottle 1    Misc Natural Products (GLUCOSAMINE CHOND COMPLEX/MSM PO) Take 1 tablet by mouth daily.  b complex vitamins capsule Take 1 capsule by mouth daily.  magnesium gluconate (MAGONATE) 500 MG tablet Take 500 mg by mouth daily.  Omega-3 Fatty Acids 1200 MG CAPS Take 1 tablet by mouth daily.  Calcium Carbonate-Vit D-Min 3159-7926 MG-UNIT CHEW Take 1 tablet by mouth daily.  hydroxychloroquine (PLAQUENIL) 200 MG tablet Take 200 mg by mouth 2 times daily.          ALLERGIES    Allergies   Allergen Reactions    Omnaris [Ciclesonide]        FAMILY HISTORY    Family History   Problem Relation Age of Onset    Stroke Father        SOCIAL HISTORY    Social History     Socioeconomic History    Marital status:      Spouse name: None    Number of children: None    Years of education: None    Highest education level: None   Occupational History    Occupation: housewife   Social Needs    Financial resource strain: None    Food insecurity     Worry: None     Inability: None    Transportation needs     Medical: None     Non-medical: None Tobacco Use    Smoking status: Never Smoker    Smokeless tobacco: Never Used   Substance and Sexual Activity    Alcohol use: No    Drug use: No    Sexual activity: None   Lifestyle    Physical activity     Days per week: None     Minutes per session: None    Stress: None   Relationships    Social connections     Talks on phone: None     Gets together: None     Attends Mos[employer] service: None     Active member of club or organization: None     Attends meetings of clubs or organizations: None     Relationship status: None    Intimate partner violence     Fear of current or ex partner: None     Emotionally abused: None     Physically abused: None     Forced sexual activity: None   Other Topics Concern    None   Social History Narrative    None       PHYSICAL EXAM    VITAL SIGNS: /60   Pulse 99   Temp 97.9 °F (36.6 °C) (Oral)   Resp 16   Ht 4' 11\" (1.499 m)   Wt 164 lb (74.4 kg)   SpO2 98%   BMI 33.12 kg/m²   Constitutional:  Well developed, well nourished, no acute distress, non-toxic appearance   Eyes:  Sclera anicteric. HENT:  NC/AT. Ears, nose normal.  Oropharynx moist.  Neck:  Supple. Respiratory:  Lungs CTAB. Cardiovascular:  RRR. GI:  Abdomen soft, non-distended, mild ttp across the lower abdomen without rebound or guarding. BS active. :  No CVA tenderness. Musculoskeletal:  No acute deformities. Integument:  Warm and dry. Neurologic:  Alert & oriented. No focal deficits.     LABS/IMAGING    Labs Reviewed   CBC WITH AUTO DIFFERENTIAL - Abnormal; Notable for the following components:       Result Value    WBC 20.0 (*)     MCH 32.3 (*)     Segs Relative 92.5 (*)     Lymphocytes % 2.5 (*)     Monocytes % 4.2 (*)     Immature Neutrophil % 0.5 (*)     All other components within normal limits   TROPONIN   LACTIC ACID, PLASMA   COMPREHENSIVE METABOLIC PANEL W/ REFLEX TO MG FOR LOW K   LIPASE   BRAIN NATRIURETIC PEPTIDE   URINE RT REFLEX TO CULTURE     Ct Abdomen Pelvis W Iv Contrast Additional Contrast? None    Result Date: 12/29/2020  EXAMINATION: CT OF THE ABDOMEN AND PELVIS WITH CONTRAST 12/29/2020 4:04 am TECHNIQUE: CT of the abdomen and pelvis was performed with the administration of intravenous contrast. Multiplanar reformatted images are provided for review. Dose modulation, iterative reconstruction, and/or weight based adjustment of the mA/kV was utilized to reduce the radiation dose to as low as reasonably achievable. COMPARISON: None. HISTORY: ORDERING SYSTEM PROVIDED HISTORY: abd pain TECHNOLOGIST PROVIDED HISTORY: Reason for exam:->abd pain Additional Contrast?->None Reason for Exam: mid low abd pain FINDINGS: Lower Chest: The lung bases are well aerated. Pleural surfaces are unremarkable and no evidence of pleural effusion is identified. Heart is mildly enlarged with otherwise unremarkable configuration. Organs: Diffuse fatty infiltration of the liver is noted. The liver, gallbladder, spleen, pancreas, adrenal glands, kidneys, are otherwise unremarkable in appearance. GI/Bowel: The stomach is unremarkable without wall thickening or distention. Scattered diverticula are seen throughout the colon without definitive evidence of adjacent inflammatory changes within the mesenteric fat. Bowel loops are unremarkable in appearance without evidence of obstruction, distension or mucosal thickening. Pelvis: The urinary bladder is well distended and unremarkable in appearance. No evidence of pelvic free fluid is seen. Peritoneum/Retroperitoneum: Scattered mild intraperitoneal free fluid is noted. No evidence of intraperitoneal or retroperitoneal lymphadenopathy is identified. Bones/Soft Tissues: The bones, skeletal muscle bundles, fascial planes and subcutaneous soft tissues are unremarkable in appearance. 1. Colonic diverticulosis without definitive evidence of diverticulitis. 2. Mild cardiomegaly. 3. Hepatic steatosis. 4. Mild scattered intraperitoneal free fluid.      Christiane Ferrer by the health facility/national standard best practice, during my bedside interactions with the patient. FINAL IMPRESSION    1. Lower abdominal pain    2. Non-intractable vomiting with nausea, unspecified vomiting type    3.  Leukocytosis, unspecified type               Mario Wright PA-C  12/30/20 2005

## 2020-12-29 NOTE — ED PROVIDER NOTES
MG/DL    Alb 4.1 3.4 - 5.0 GM/DL    Total Protein 6.9 6.4 - 8.2 GM/DL    Total Bilirubin 0.9 0.0 - 1.0 MG/DL    ALT 13 10 - 40 U/L    AST 19 15 - 37 IU/L    Alkaline Phosphatase 68 40 - 128 IU/L    GFR Non-African American >60 >60 mL/min/1.73m2    GFR African American >60 >60 mL/min/1.73m2    Anion Gap 11 4 - 16   Lipase   Result Value Ref Range    Lipase 40 13 - 60 IU/L   Troponin   Result Value Ref Range    Troponin T <0.010 <0.01 NG/ML   Brain Natriuretic Peptide   Result Value Ref Range    Pro-.4 <300 PG/ML   Lactic Acid, Plasma   Result Value Ref Range    Lactate 1.6 0.4 - 2.0 mMOL/L   Urinalysis Reflex to Culture    Specimen: Urine   Result Value Ref Range    Color, UA YELLOW YELLOW    Clarity, UA CLEAR CLEAR    Glucose, Urine NEGATIVE NEGATIVE MG/DL    Bilirubin Urine NEGATIVE NEGATIVE MG/DL    Ketones, Urine NEGATIVE NEGATIVE MG/DL    Specific Gravity, UA 1.034 1.001 - 1.035    Blood, Urine SMALL (A) NEGATIVE    pH, Urine 5.0 5.0 - 8.0    Protein, UA NEGATIVE NEGATIVE MG/DL    Urobilinogen, Urine NORMAL 0.2 - 1.0 MG/DL    Nitrite Urine, Quantitative NEGATIVE NEGATIVE    Leukocyte Esterase, Urine NEGATIVE NEGATIVE    RBC, UA 3 0 - 6 /HPF    WBC, UA 1 0 - 5 /HPF    Bacteria, UA NEGATIVE NEGATIVE /HPF    Squam Epithel, UA 7 /HPF    Trans Epithel, UA <1 /HPF    Trichomonas, UA NONE SEEN NONE SEEN /HPF   EKG 12 Lead   Result Value Ref Range    Ventricular Rate 82 BPM    Atrial Rate 82 BPM    P-R Interval 164 ms    QRS Duration 92 ms    Q-T Interval 394 ms    QTc Calculation (Bazett) 460 ms    P Axis 47 degrees    R Axis 3 degrees    T Axis 36 degrees    Diagnosis       Sinus rhythm with occasional premature ventricular complexes  Possible Anterior infarct , age undetermined  Abnormal ECG  No previous ECGs available         MDM:  Patient presents for abdominal pain and vomiting. She is on methotrexate and Plaquenil. My abdominal exam is concerning for guarding.   She has a white count of 20 with left shift. Lactic acid is normal.  CT shows mild scattered intraperitoneal free fluid of unclear origin. Patient provided with more medication for nausea and pain. She was started on Zosyn due to concern for peritonitis. Consult placed to general surgery, Dr. Larry Limon who recommended admission and will personally see patient as soon as he finishes his current surgical procedure. Consult placed to Dr. Belinda Rodriguez with hospitalist group who agreed to admit. In consideration of current COVID19 pandemic, with effort to minimize unnecessary provider exposure, this patient was seen at bedside by me independently. However, in compliance with current hospital MATT/ED protocol, prior to admission I did discuss this patient case with emergency department physician, Dr. Chrissie Nesbitt. Of note, this Pt was NOT admitted to the ICU. Final Impression:  1. Lower abdominal pain    2. Non-intractable vomiting with nausea, unspecified vomiting type    3.  Leukocytosis, unspecified type        (Please note that portions of this note may have been completed with a voice recognition program. Efforts were made to edit the dictations but occasionally words are mis-transcribed.)    Rodo Beltran PA-C          [address] [place] [address] MARYJO  12/29/20 [id number]

## 2020-12-29 NOTE — CONSULTS
Department of General Surgery   Surgical Service Dr. Rios Nip   Consult Note    Date of Consult: 12/29/20    Reason for Consult:  Abdominal pain  Requesting Physician:  Alexis Birch PA-C    CHIEF COMPLAINT:  Abdominal pain, nausea and vomiting    History Obtained From:  patient    HISTORY OF PRESENT ILLNESS:    The patient is a 77 y.o. female with a history of Sjogrens on methotrexate and plaquenil. She presents with abdominal pain starting yesterday evening. Pain was severe in nature. A short time later she became nauseous with vomiting. Pain is constant. Medication makes it better. Denies other symptoms. She does have chronic constipation but had a BM a day ago. Workup is significant for WBC of 20.  (she reports chronic WBC elevation however WBC has been normal on previous labs in our system). CT without obvious intra-abdominal pathology although a small amount of free fluid is noted.       Past Medical History:    Past Medical History:   Diagnosis Date    Arthritis     Dry eye     GERD (gastroesophageal reflux disease)     Hiatal hernia     Hyperlipidemia     Hypertension     Osteopenia     Osteoporosis     TIA (transient ischemic attack)        Past Surgical History:    Past Surgical History:   Procedure Laterality Date    BACK SURGERY      scoliosis    CARPAL TUNNEL RELEASE      CYST REMOVAL      ESOPHAGEAL DILATATION      HYSTERECTOMY      Total    JOINT REPLACEMENT  09/26/1916    knee L       Current Medications:   Current Facility-Administered Medications   Medication Dose Route Frequency Provider Last Rate Last Admin    fentaNYL (SUBLIMAZE) injection 50 mcg  50 mcg Intravenous Q1H PRN Ally Swift PA-C   50 mcg at 12/29/20 0319    sodium chloride flush 0.9 % injection 10 mL  10 mL Intravenous BID Ally Swift PA-C        ondansetron [facility]) injection 4 mg  4 mg Intravenous Q30 Min PRN Fly Escobar PA-C   4 mg at 12/29/20 1495     Current Outpatient Medications Medication Sig Dispense Refill    ondansetron (ZOFRAN ODT) 4 MG disintegrating tablet Take 1 tablet by mouth every 6 hours 20 tablet 0    famotidine (PEPCID) 40 MG tablet Take 40 mg by mouth daily      methotrexate (RHEUMATREX) 2.5 MG chemo tablet Take 10 mg by mouth once a week 12/29/2020 Patient takes on Tuesdays due today. Takes with lunch meal      aspirin 81 MG tablet Take 81 mg by mouth 2 times daily       Flaxseed, Linseed, (FLAXSEED OIL) 1200 MG CAPS Take 2,400 mg by mouth daily      lisinopril-hydrochlorothiazide (PRINZIDE;ZESTORETIC) 20-12.5 MG per tablet Take 1 tablet by mouth daily 90 tablet 1    atorvastatin (LIPITOR) 10 MG tablet Take 1 tablet by mouth nightly 90 tablet 1    vitamin D (CHOLECALCIFEROL) 1000 UNIT TABS tablet Take 1,000 Units by mouth 2 times daily      minocycline (MINOCIN;DYNACIN) 50 MG capsule Take 50 mg by mouth daily      Misc Natural Products (GLUCOSAMINE CHOND COMPLEX/MSM PO) Take 1 tablet by mouth daily.  b complex vitamins capsule Take 1 capsule by mouth daily.  magnesium gluconate (MAGONATE) 500 MG tablet Take 500 mg by mouth daily.  Omega-3 Fatty Acids 1200 MG CAPS Take 1 tablet by mouth daily.  Calcium Carbonate-Vit D-Min 8691-2502 MG-UNIT CHEW Take 1 tablet by mouth daily.  zoledronic acid (RECLAST) 5 MG/100ML SOLN Infuse 100 mLs intravenously once for 1 dose To be set up through infusion unit at [facility]. Orders for infusion per protocol (Patient taking differently: Infuse 5 mg intravenously once To be set up through infusion unit at [facility]. Orders for infusion per protocol, was scheduled for today 12/29/2020) 100 mL 0    hydroxychloroquine (PLAQUENIL) 200 MG tablet Take 200 mg by mouth 2 times daily.          Allergies:  Omnaris [ciclesonide]    Social History:   Social History     Socioeconomic History    Marital status:      Spouse name: None    Number of children: None    Years of education: None    Highest education level: None Occupational History    Occupation: housewife   Social Needs    Financial resource strain: None    Food insecurity     Worry: None     Inability: None    Transportation needs     Medical: None     Non-medical: None   Tobacco Use    Smoking status: Never Smoker    Smokeless tobacco: Never Used   Substance and Sexual Activity    Alcohol use: No    Drug use: No    Sexual activity: None   Lifestyle    Physical activity     Days per week: None     Minutes per session: None    Stress: None   Relationships    Social connections     Talks on phone: None     Gets together: None     Attends [employer] service: None     Active member of club or organization: None     Attends meetings of clubs or organizations: None     Relationship status: None    Intimate partner violence     Fear of current or ex partner: None     Emotionally abused: None     Physically abused: None     Forced sexual activity: None   Other Topics Concern    None   Social History Narrative    None       Family History:   Family History   Problem Relation Age of Onset    Stroke Father        REVIEW OFSYSTEMS:    Review of Systems   Constitutional: Negative for chills. Negative for fever. HENT: Negative for congestion. Negative for rhinorrhea. Respiratory: Negative for cough. Negative for shortness of breath. Negative for wheezing. Cardiovascular: Negative for chest pain. Gastrointestinal: abdominal pain, nausea and vomiting. Genitourinary: Negative for difficulty urinating. Neurological: Negative for dizziness, syncope and numbness. Hematological: Does not bruise/bleed easily.  On ASA 162mg daily      PHYSICAL EXAM:  Vitals:    12/29/20 0530 12/29/20 0600 12/29/20 0630 12/29/20 0730   BP: 137/88 (!) 137/59 100/82 (!) 134/94   Pulse: 98 92 93 95   Resp: 24 20 18 13   Temp:       TempSrc:       SpO2: 98% 99% 100% 100%   Weight:       Height:           Physical Exam  General: awake, alert, in no acute distress  HEENT: mucous membranes moist  Respiratory: normal effort, no wheezes appreciated  CV: appears well perfused, regular rate and rhythm  Abdomen: Soft, moderately tender in the central abdomen with localized guarding, remainder of the abdomen is benign, non-distended. Skin: warm and dry  Extremities: atraumatic  Neuro: no focal deficits noted  Psych: mood normal        DATA:    Lab Results   Component Value Date    WBC 20.0 (H) 12/29/2020    HGB 14.2 12/29/2020    HCT 43.9 12/29/2020    MCV 99.8 12/29/2020     12/29/2020     Lab Results   Component Value Date     12/29/2020    K 3.8 12/29/2020    CL 98 12/29/2020    CO2 24 12/29/2020    BUN 22 12/29/2020    CREATININE 0.7 12/29/2020    GLUCOSE 219 12/29/2020    CALCIUM 9.2 12/29/2020      CT- diverticulosis, tiny amount of free fluid in the abdome    IMPRESSION:    Junior Jones is a 77 y.o. female with abdominal pain and nausea/vomiting. Elevated WBC at 20 today.   CT without evidence of major abdominal disorder but she is having significant pain in the central abdomen    Patient Active Problem List:     Primary osteoarthritis of left knee     Genu valgum, acquired     Essential hypertension     Sjogren's disease ([facility] [address]     Vitamin D deficiency     Environmental allergies     Mixed hyperlipidemia     Osteoporosis     Abdominal pain        PLAN:  - serial abdominal exam  - trend labs  - will continue to follow        Electronically signed by Jessica Marquez MD on 12/29/2020 at 9:15 AM

## 2020-12-29 NOTE — H&P
[facility]    History and Physical      Name:  Chris Jones /Age/Sex: 1945  [facility]77 y.o. female)   MRN & CSN:  [id number] & [id number] Admission Date/Time: 2020  2:10 AM   Location:  [address] [place] [address] PCP: Jackelin Hitchcock PA-C       Hospital Day: 1    Assessment and Plan:   Chris Jones is a 77 y.o.  female with past medical history of Sjogren's syndrome, hypertension, hyperlipidemia, who presents with abdominal pain and vomiting    · Intractable abdominal pain and vomiting -unclear etiology, likely esophagitis or peptic ulcer disease, CT abdomen and pelvis with chronic diverticulosis and hepatic steatosis but no other acute issues, keep n.p.o. with ice chips, IV hydration, monitor electrolytes and adjust as needed, general surgery consulted, IV Protonix BID    · Sjogren's syndrome -on methotrexate and Plaquenil -we will hold methotrexate for now but may resume tomorrow    · Leukocytosis -no clear evidence of infection, was given IV Zosyn but will hold off any antibiotics for now-monitor CBC    Other comorbid conditions addressed include:    Hyperlipidemia  Hypertension    Diet Diet NPO Effective Now Exceptions are: Ice Chips, Sips with Meds   DVT Prophylaxis [] Lovenox, []  Heparin, [] SCDs, [] No VTE prophylaxis, patient ambulating   GI Prophylaxis [] PPI, [] H2 Blocker, [] No GI prophylaxis, patient is receiving diet/Tube Feeds   Code Status Full Code   Disposition Patient requires continued admission due to intractable abdominal pain and vomiting   MDM [] Low, [x] Moderate,[]  High  Patient's risk as above due to      History of Present Illness:     Chief Complaint: Abdominal pain and vomiting    Chris Jones is a 77 y.o.  female  with past medical history of Sjogren's syndrome, hypertension, hyperlipidemia, who presents with abdominal pain and vomiting. Patient started to have epigastric area abdominal pain which was associated with vomiting since last night.   She had vomited about 5-6 times before she came to the hospital which was all ingested material but no blood. Abdominal pain is described as similar to her contractions when she gave birth. Does not radiate. No exacerbating or alleviating factor. She denies any fever. She has chronic constipation and her last bowel movement was yesterday. She has a previous history of EGD with some dilation of the esophagus in the past 2 or 3 years. Ten point ROS reviewed negative, unless as noted above    Objective:   No intake or output data in the 24 hours ending 12/29/20 1303   Vitals:   Vitals:    12/29/20 1000   BP: 137/62   Pulse: 84   Resp: 16   Temp: 99.5 °F (37.5 °C)   SpO2: 97%     Physical Exam:    GEN Awake female, resting in bed in no apparent distress. Appears given age. EYES Pupils are equally round. No scleral erythema, discharge, or conjunctivitis. HENT Mucous membranes are moist.   NECK No apparent thyromegaly or masses. RESP Clear to auscultation, no wheezes, rales or rhonchi. Symmetric chest movement while on room air. CARDIO/VASC S1/S2 auscultated. Regular rate without appreciable murmurs, rubs, or gallops. Peripheral pulses equal bilaterally and palpable. No peripheral edema. GI Abdomen is soft with moderate epigastric tenderness, no masses, or guarding. Bowel sounds are normoactive. Rectal exam deferred.  Christie catheter is not present. HEME/LYMPH No petechiae or ecchymoses. MSK No gross joint deformities. Spontaneous movement of all extremities  SKIN Normal coloration, warm, dry. NEURO Cranial nerves appear grossly intact, normal speech, no lateralizing weakness. PSYCH Awake, alert, oriented x 4. Affect appropriate.     Past Medical History:      Past Medical History:   Diagnosis Date    Arthritis     Dry eye     GERD (gastroesophageal reflux disease)     Hiatal hernia     Hyperlipidemia     Hypertension     Osteopenia     Osteoporosis     TIA (transient ischemic attack)      PSHX:  has a past surgical 10 mL Intravenous 2 times per day    enoxaparin  40 mg Subcutaneous Daily    pantoprazole  40 mg Intravenous Daily      Infusions:    sodium chloride 100 mL/hr at 12/29/20 1130     PRN Meds:     ondansetron, 4 mg, Q30 Min PRN      sodium chloride flush, 10 mL, PRN      promethazine, 12.5 mg, Q6H PRN    Or      ondansetron, 4 mg, Q6H PRN      polyethylene glycol, 17 g, Daily PRN      acetaminophen, 650 mg, Q6H PRN    Or      acetaminophen, 650 mg, Q6H PRN      potassium chloride, 40 mEq, PRN    Or      potassium alternative oral replacement, 40 mEq, PRN    Or      potassium chloride, 10 mEq, PRN      morphine, 4 mg, Q4H PRN      Current home medications   Prior to Admission medications    Medication Sig Start Date End Date Taking? Authorizing Provider   ondansetron (ZOFRAN ODT) 4 MG disintegrating tablet Take 1 tablet by mouth every 6 hours 12/29/20  Yes Jacey Vásquez PA-C   famotidine (PEPCID) 40 MG tablet Take 40 mg by mouth daily   Yes Historical Provider, MD   methotrexate (RHEUMATREX) 2.5 MG chemo tablet Take 10 mg by mouth once a week 12/29/2020 Patient takes on Tuesdays due today. Takes with lunch meal   Yes Historical Provider, MD   aspirin 81 MG tablet Take 81 mg by mouth 2 times daily    Yes Historical Provider, MD   Flaxseed, Linseed, (FLAXSEED OIL) 1200 MG CAPS Take 2,400 mg by mouth daily   Yes Historical Provider, MD   lisinopril-hydrochlorothiazide (PRINZIDE;ZESTORETIC) 20-12.5 MG per tablet Take 1 tablet by mouth daily 1/5/17  Yes Adrien Anderson PA-C   atorvastatin (LIPITOR) 10 MG tablet Take 1 tablet by mouth nightly 1/5/17  Yes Adrien Anderson PA-C   vitamin D (CHOLECALCIFEROL) 1000 UNIT TABS tablet Take 1,000 Units by mouth 2 times daily   Yes Historical Provider, MD   minocycline (MINOCIN;DYNACIN) 50 MG capsule Take 50 mg by mouth daily   Yes Historical Provider, MD   Misc Natural Products (GLUCOSAMINE CHOND COMPLEX/MSM PO) Take 1 tablet by mouth daily.    Yes Historical Provider, MD   b complex vitamins capsule Take 1 capsule by mouth daily. Yes Historical Provider, MD   magnesium gluconate (MAGONATE) 500 MG tablet Take 500 mg by mouth daily. Yes Historical Provider, MD   Omega-3 Fatty Acids 1200 MG CAPS Take 1 tablet by mouth daily. Yes Historical Provider, MD   Calcium Carbonate-Vit D-Min 6415-6468 MG-UNIT CHEW Take 1 tablet by mouth daily. Yes Historical Provider, MD   zoledronic acid (RECLAST) 5 MG/100ML SOLN Infuse 100 mLs intravenously once for 1 dose To be set up through infusion unit at [facility]. Orders for infusion per protocol  Patient taking differently: Infuse 5 mg intravenously once To be set up through infusion unit at [facility]. Orders for infusion per protocol, was scheduled for today 12/29/2020 9/22/16 9/22/16  Rigoberto Anderson PA-C   hydroxychloroquine (PLAQUENIL) 200 MG tablet Take 200 mg by mouth 2 times daily.     Historical Provider, MD       PHYSICIAN CERTIFICATION    I certify that Oliverio Narvaez is expected to be hospitalized for less than 2 midnights based on the above assessment and plan:     Current diagnosis and plan of management discussed with the patient at the time of admission in lay language     Code status was discussed with patient - Full code     Pain Assessment -morphine for abdominal pain as needed    Electronically signed by Conchis Quiroga MD on 12/29/2020 at 1:03 PM

## 2020-12-29 NOTE — ED PROVIDER NOTES
EKG:  Sinus rhythm with occasional PVCs. Rate of 82. DE interval 164, QRS 92, QTc 460. No ST elevations or depressions. Nonspecific T waves. Impression: Abnormal EKG. When compared to previous EKG from 6/16/2014, the PVCs are new.      Dontrell Dee MD  12/29/20 0831

## 2020-12-29 NOTE — CARE COORDINATION
Met c pt to initiate discharge planning. Pt states she lives at home with spouse, remains ind with ADLs including driving and uses no DME. Pt states she has pcp/ active insurance and can afford meds. Pt denied needs, advised CM available if needs arise.

## 2020-12-29 NOTE — ED NOTES
To restroom.  Pt was unable to get urine sample     Anju Williamson RN  12/29/20 7434       Anju Williamson RN  12/29/20 2642

## 2020-12-29 NOTE — PROGRESS NOTES
Medication History  [facility]    Patient Name: Moncho Sow 1945     Medication history has been completed by: Mary Seals CPhT    Source(s) of information: patient and insurance claims     Primary Care Physician: Yoel Whalen PA-C     Pharmacy: [address] [place] [address], [id number]    Allergies as of 12/29/2020 - Review Complete 12/29/2020   Allergen Reaction Noted    Omnaris [ciclesonide]  06/08/2016        Prior to Admission medications    Medication Sig Start Date End Date Taking? Authorizing Provider   ondansetron (ZOFRAN ODT) 4 MG disintegrating tablet Take 1 tablet by mouth every 6 hours 12/29/20  Yes Lucy Wray PA-C   famotidine (PEPCID) 40 MG tablet Take 40 mg by mouth daily   Yes Historical Provider, MD   methotrexate (RHEUMATREX) 2.5 MG chemo tablet Take 10 mg by mouth once a week 12/29/2020 Patient takes on Tuesdays due today. Takes with lunch meal   Yes Historical Provider, MD   aspirin 81 MG tablet Take 81 mg by mouth 2 times daily    Yes Historical Provider, MD   Flaxseed, Linseed, (FLAXSEED OIL) 1200 MG CAPS Take 2,400 mg by mouth daily   Yes Historical Provider, MD   lisinopril-hydrochlorothiazide (PRINZIDE;ZESTORETIC) 20-12.5 MG per tablet Take 1 tablet by mouth daily 1/5/17  Yes Adrien Anderson PA-C   atorvastatin (LIPITOR) 10 MG tablet Take 1 tablet by mouth nightly 1/5/17  Yes Adrien Anderson PA-C   vitamin D (CHOLECALCIFEROL) 1000 UNIT TABS tablet Take 1,000 Units by mouth 2 times daily   Yes Historical Provider, MD   minocycline (MINOCIN;DYNACIN) 50 MG capsule Take 50 mg by mouth daily   Yes Historical Provider, MD   Misc Natural Products (GLUCOSAMINE CHOND COMPLEX/MSM PO) Take 1 tablet by mouth daily. Yes Historical Provider, MD   b complex vitamins capsule Take 1 capsule by mouth daily. Yes Historical Provider, MD   magnesium gluconate (MAGONATE) 500 MG tablet Take 500 mg by mouth daily.    Yes Historical Provider, MD   Omega-3 Fatty Acids 1200 MG CAPS Take 1 tablet by mouth daily. Yes Historical Provider, MD   Calcium Carbonate-Vit D-Min 5023-1154 MG-UNIT CHEW Take 1 tablet by mouth daily. Yes Historical Provider, MD   zoledronic acid (RECLAST) 5 MG/100ML SOLN Infuse 100 mLs intravenously once for 1 dose To be set up through infusion unit at [facility]. Orders for infusion per protocol  Patient taking differently: Infuse 5 mg intravenously once To be set up through infusion unit at [facility]. Orders for infusion per protocol, was scheduled for today 12/29/2020 9/22/16 9/22/16  Laura Anderson PA-C   hydroxychloroquine (PLAQUENIL) 200 MG tablet Take 200 mg by mouth 2 times daily. Historical Provider, MD     Medications added or changed (ex. new medication, dosage change, interval change, formulation change):  Ranitidine changed to Famotidine    Medications removed from list (include reason, ex. noncompliance, medication cost, therapy complete etc.):   Ferrous sulfate patient no longer taking  Fluticasone spray patient no longer taking  Folic acid patient no longer taking    Comments:  Medication list reviewed with patient and insurance claims verified. Methotrexate therapy updated as patient takes 10 mg on Tuesdays. . due today usually takes with lunch. Patient scheduled for Reclast infusion today out patient infusion clinic. Patient aware of need to reschedule. Patient reports she only took morning medications yesterday 12/28/20.     To my knowledge the above medication history is accurate as of 12/29/2020 7:56 AM.   Ej Gonzalez CPhT   12/29/2020 7:56 AM

## 2020-12-30 NOTE — ANESTHESIA POSTPROCEDURE EVALUATION
Department of Anesthesiology  Postprocedure Note    Patient: Rut Prater  MRN: [id number]  YOB: 1945  Date of evaluation: 12/30/2020  Time:  12:24 PM     Procedure Summary     Date: 12/30/20 Room / Location: [address]    Anesthesia Start: 1010 Anesthesia Stop: 5227    Procedure: LAPAROTOMY EXPLORATORY WITH DISTAL SIGMOID COLON RESECTION (N/A Abdomen) Diagnosis: (EX LAP)    Surgeons: Darryle Lobos, MD Responsible Provider: Renetta Rosario MD    Anesthesia Type: general ASA Status: 3          Anesthesia Type: general    Arely Phase I:      Arely Phase II:      Last vitals: Reviewed and per EMR flowsheets.        Anesthesia Post Evaluation    Patient location during evaluation: PACU  Patient participation: complete - patient participated  Level of consciousness: awake and alert  Pain score: 0  Airway patency: patent  Nausea & Vomiting: no nausea and no vomiting  Complications: no  Cardiovascular status: blood pressure returned to baseline  Respiratory status: acceptable, nasal cannula, spontaneous ventilation and nonlabored ventilation

## 2020-12-30 NOTE — PROGRESS NOTES
1223: Arrived to PACU from OR. Monitors applied, alarms on. Report obtained from [facility] and NAEEM Gallegos. 1249: Turned and repositioned in bed, warm blankets on. Medicated for abdominal discomfort. 1335: Dozing. 1352: Transported to room 4108.  called and update given.

## 2020-12-30 NOTE — PROGRESS NOTES
Hospitalist Progress Note         Admit Date: 12/29/2020    PCP: Rickie Schirmer, PA-C     Chief Complaint   Patient presents with    Abdominal Pain    Emesis        Assessment and Plan:     -Abdominal pain/nausea and vomiting with no evidence of sepsis  Going for exploratory laparotomy. Pain control, IVF. Not on ABX. -Sjogren's syndrome we will restart methotrexate and Plaquenil when appropriate.  -Leukocytosis possibly from stress related. Monitor CBC while off antibiotics. -HTN continue lisinopril/HCTZ.   Monitor vitals      VTE prophylaxis LMWH    Current Facility-Administered Medications   Medication Dose Route Frequency Provider Last Rate Last Admin    sodium chloride (OCEAN, BABY AYR) 0.65 % nasal spray 1 spray  1 spray Each Nostril PRN Tucker Key MD        aspirin chewable tablet 81 mg  81 mg Oral BID Tucker Key MD        sodium chloride flush 0.9 % injection 10 mL  10 mL Intravenous BID Tucker Key MD   Stopped at 12/30/20 0900    ondansetron (ZOFRAN) injection 4 mg  4 mg Intravenous Q30 Min PRN Tucker Key MD   4 mg at 12/29/20 0804    atorvastatin (LIPITOR) tablet 10 mg  10 mg Oral Nightly Tucker Key MD   10 mg at 12/29/20 2140    calcium-vitamin D (OSCAL-500) 500-200 MG-UNIT per tablet 2 tablet  2 tablet Oral Daily Tucker Key MD   Stopped at 12/30/20 0900    hydroxychloroquine (PLAQUENIL) tablet 200 mg  200 mg Oral BID Tucker Key MD   Stopped at 12/30/20 0900    lisinopril-hydroCHLOROthiazide (PRINZIDE;ZESTORETIC) 20-12.5 MG per tablet 1 tablet  1 tablet Oral Daily Tucker Key MD   Stopped at 12/30/20 0900    [Held by provider] methotrexate (RHEUMATREX) chemo tablet 10 mg  10 mg Oral Weekly Tucker Key MD        minocycline (MINOCIN;DYNACIN) capsule 50 mg  50 mg Oral Nightly Tucker Key MD        omega-3 acid ethyl esters (LOVAZA) capsule 1,000 mg  1,000 mg Oral Daily Tucker Key MD   Stopped at (Last 24 hours) at 12/30/2020 1521  Last data filed at 12/30/2020 1340  Gross per 24 hour   Intake 1700 ml   Output 1400 ml   Net 300 ml      Vitals:   Vitals:    12/30/20 1432   BP: (!) 140/64   Pulse: 99   Resp: 16   Temp: 98.2 °F (36.8 °C)   SpO2: 98%     Physical Exam:  General Appearance: Moderate distress +  Head:      Normocephalic, without obvious abnormality, atraumatic  Eyes:       Conjunctiva/corneas clear, EOM's intact  Lungs:    Clear to auscultation bilaterally, respirations unlabored  Heart:                Regular rate and rhythm, S1 and S2 normal, no murmur,   rub or gallop  Abdomen:     Soft, lower abdominal tenderness +, BS limited  Extremities:   Extremities normal, atraumatic, no cyanosis or edema  Neurological:   Grossly Intact. Significant Diagnostic Studies:   DATA:    CBC   Recent Labs     12/29/20 0215 12/29/20 1957 12/30/20  0644   WBC 20.0* 15.1* 13.8*   HGB 14.2 12.3* 11.7*   HCT 43.9 36.7* 35.5*    192 178      BMP   Recent Labs     12/29/20 0215 12/29/20 1957 12/30/20  0644   * 137 140   K 3.8 3.4* 3.4*   CL 98* 102 104   CO2 24 25 24   BUN 22 14 13   CREATININE 0.7 0.7 0.7     LFT'S   Recent Labs     12/29/20 0215   AST 19   ALT 13   BILITOT 0.9   ALKPHOS 68     COAG No results for input(s): INR in the last 72 hours. POC: No results found for: POCGLU  [id number]:No results found for: LABA1C  CARDIAC ENZYMES  No results for input(s): CKTOTAL, CKMB, CKMBINDEX, TROPONINI in the last 72 hours.   Troponin:   Recent Labs     12/29/20 0215   TROPONINT <0.010     BNP:   Recent Labs     12/29/20 0215   PROBNP 172.4     U/A:    Lab Results   Component Value Date    COLORU YELLOW 12/29/2020    WBCUA 1 12/29/2020    RBCUA 3 12/29/2020    MUCUS NEGATIVE 08/01/2018    BACTERIA NEGATIVE 12/29/2020    CLARITYU CLEAR 12/29/2020    SPECGRAV 1.034 12/29/2020    LEUKOCYTESUR NEGATIVE 12/29/2020    BLOODU SMALL 12/29/2020       Ct Abdomen Pelvis W Iv Contrast Additional Contrast? None    Result Date: 12/29/2020  EXAMINATION: CT OF THE ABDOMEN AND PELVIS WITH CONTRAST 12/29/2020 4:04 am TECHNIQUE: CT of the abdomen and pelvis was performed with the administration of intravenous contrast. Multiplanar reformatted images are provided for review. Dose modulation, iterative reconstruction, and/or weight based adjustment of the mA/kV was utilized to reduce the radiation dose to as low as reasonably achievable. COMPARISON: None. HISTORY: ORDERING SYSTEM PROVIDED HISTORY: abd pain TECHNOLOGIST PROVIDED HISTORY: Reason for exam:->abd pain Additional Contrast?->None Reason for Exam: mid low abd pain FINDINGS: Lower Chest: The lung bases are well aerated. Pleural surfaces are unremarkable and no evidence of pleural effusion is identified. Heart is mildly enlarged with otherwise unremarkable configuration. Organs: Diffuse fatty infiltration of the liver is noted. The liver, gallbladder, spleen, pancreas, adrenal glands, kidneys, are otherwise unremarkable in appearance. GI/Bowel: The stomach is unremarkable without wall thickening or distention. Scattered diverticula are seen throughout the colon without definitive evidence of adjacent inflammatory changes within the mesenteric fat. Bowel loops are unremarkable in appearance without evidence of obstruction, distension or mucosal thickening. Pelvis: The urinary bladder is well distended and unremarkable in appearance. No evidence of pelvic free fluid is seen. Peritoneum/Retroperitoneum: Scattered mild intraperitoneal free fluid is noted. No evidence of intraperitoneal or retroperitoneal lymphadenopathy is identified. Bones/Soft Tissues: The bones, skeletal muscle bundles, fascial planes and subcutaneous soft tissues are unremarkable in appearance. 1. Colonic diverticulosis without definitive evidence of diverticulitis. 2. Mild cardiomegaly. 3. Hepatic steatosis. 4. Mild scattered intraperitoneal free fluid.      Xr Chest Portable    Result Date:

## 2020-12-30 NOTE — PROGRESS NOTES
Called [facility] again about rapid COVID test, Nas Payne stated \"I lost a machine so it will be about 30-45 minutes\".

## 2020-12-30 NOTE — ANESTHESIA PRE PROCEDURE
Department of Anesthesiology  Preprocedure Note       Name:  Fior Jeff   Age:  77 y.o.  :  1945                                          MRN:  [id number]         Date:  2020      Surgeon: Birgit Mauricio):  Kalpana Freeman MD    Procedure: Procedure(s):  LAPAROTOMY EXPLORATORY WITH STIRRUPS    Medications prior to admission:   Prior to Admission medications    Medication Sig Start Date End Date Taking? Authorizing Provider   ondansetron (ZOFRAN ODT) 4 MG disintegrating tablet Take 1 tablet by mouth every 6 hours 20  Yes Randal Bobo PA-C   famotidine (PEPCID) 40 MG tablet Take 40 mg by mouth daily   Yes Historical Provider, MD   methotrexate (RHEUMATREX) 2.5 MG chemo tablet Take 10 mg by mouth once a week 2020 Patient takes on  due today. Takes with lunch meal   Yes Historical Provider, MD   aspirin 81 MG tablet Take 81 mg by mouth 2 times daily    Yes Historical Provider, MD   Flaxseed, Linseed, (FLAXSEED OIL) 1200 MG CAPS Take 2,400 mg by mouth daily   Yes Historical Provider, MD   lisinopril-hydrochlorothiazide (PRINZIDE;ZESTORETIC) 20-12.5 MG per tablet Take 1 tablet by mouth daily 17  Yes Adrien Anderson PA-C   atorvastatin (LIPITOR) 10 MG tablet Take 1 tablet by mouth nightly 17  Yes Adrien Anderson PA-C   vitamin D (CHOLECALCIFEROL) 1000 UNIT TABS tablet Take 1,000 Units by mouth 2 times daily   Yes Historical Provider, MD   minocycline (MINOCIN;DYNACIN) 50 MG capsule Take 50 mg by mouth daily   Yes Historical Provider, MD   Misc Natural Products (GLUCOSAMINE CHOND COMPLEX/MSM PO) Take 1 tablet by mouth daily. Yes Historical Provider, MD   b complex vitamins capsule Take 1 capsule by mouth daily. Yes Historical Provider, MD   magnesium gluconate (MAGONATE) 500 MG tablet Take 500 mg by mouth daily. Yes Historical Provider, MD   Omega-3 Fatty Acids 1200 MG CAPS Take 1 tablet by mouth daily.    Yes Historical Provider, MD   Calcium Carbonate-Vit D-Min 4205-0010 MG-UNIT CHEW Take 1 tablet by mouth daily. Yes Historical Provider, MD   zoledronic acid (RECLAST) 5 MG/100ML SOLN Infuse 100 mLs intravenously once for 1 dose To be set up through infusion unit at [facility]. Orders for infusion per protocol  Patient taking differently: Infuse 5 mg intravenously once To be set up through infusion unit at [facility]. Orders for infusion per protocol, was scheduled for today 12/29/2020 9/22/16 9/22/16  Kennedy Anderson PA-C   hydroxychloroquine (PLAQUENIL) 200 MG tablet Take 200 mg by mouth 2 times daily.     Historical Provider, MD       Current medications:    Current Facility-Administered Medications   Medication Dose Route Frequency Provider Last Rate Last Admin    sodium chloride (OCEAN, BABY AYR) 0.65 % nasal spray 1 spray  1 spray Each Nostril PRN Adia Harding APRN - CNP        ceFAZolin (ANCEF) 2 g in dextrose 5 % 100 mL IVPB  2 g Intravenous On Call to Saeid Mistry MD        metronidazole (FLAGYL) 500 mg in NaCl 100 mL IVPB premix  500 mg Intravenous On Call to Saeid Mistry MD        sodium chloride flush 0.9 % injection 10 mL  10 mL Intravenous BID Luis Farley MD   10 mL at 12/29/20 1018    ondansetron (ZOFRAN) injection 4 mg  4 mg Intravenous Q30 Min PRN Luis Farley MD   4 mg at 12/29/20 0804    atorvastatin (LIPITOR) tablet 10 mg  10 mg Oral Nightly Adam Ring MD   10 mg at 12/29/20 2140    [Held by provider] aspirin chewable tablet 81 mg  81 mg Oral BID Luis Farley MD        calcium-vitamin D (OSCAL-500) 500-200 MG-UNIT per tablet 2 tablet  2 tablet Oral Daily Luis Farley MD        hydroxychloroquine (PLAQUENIL) tablet 200 mg  200 mg Oral BID Luis Farley MD   200 mg at 12/29/20 2140    lisinopril-hydroCHLOROthiazide (PRINZIDE;ZESTORETIC) 20-12.5 MG per tablet 1 tablet  1 tablet Oral Daily Luis Farley MD   1 tablet at 12/29/20 1129    [Held by provider] methotrexate (RHEUMATREX) chemo tablet 10 mg  10 mg Oral Weekly Adam Yoo MD        minocycline (MINOCIN;DYNACIN) capsule 50 mg  50 mg Oral Nightly Adelaide Petersen MD        omega-3 acid ethyl esters (LOVAZA) capsule 1,000 mg  1,000 mg Oral Daily Adelaide Petersen MD        vitamin D CAPS 1,000 Units  1,000 Units Oral BID Adelaide Petersen MD        0.9 % sodium chloride infusion   Intravenous Continuous Adelaide Petersen  mL/hr at 12/29/20 2140 New Bag at 12/29/20 2140    sodium chloride flush 0.9 % injection 10 mL  10 mL Intravenous 2 times per day Adelaide Petersen MD   10 mL at 12/29/20 1130    sodium chloride flush 0.9 % injection 10 mL  10 mL Intravenous PRN Adelaide Petersen MD        enoxaparin (LOVENOX) injection 40 mg  40 mg Subcutaneous Daily Adam Ring MD   40 mg at 12/29/20 1129    promethazine (PHENERGAN) tablet 12.5 mg  12.5 mg Oral Q6H PRN Adelaide Petersen MD        Or    ondansetron (ZOFRAN) injection 4 mg  4 mg Intravenous Q6H PRN Adelaide Petersen MD   4 mg at 12/29/20 1808    polyethylene glycol (GLYCOLAX) packet 17 g  17 g Oral Daily PRN Adelaide Petersen MD        acetaminophen (TYLENOL) tablet 650 mg  650 mg Oral Q6H PRN Adelaide Petersen MD        Or   [facility] acetaminophen (TYLENOL) suppository 650 mg  650 mg Rectal Q6H PRN Adelaide Petersen MD        potassium chloride (KLOR-CON M) extended release tablet 40 mEq  40 mEq Oral PRN Adelaide Petersen MD        Or    potassium bicarb-citric acid (EFFER-K) effervescent tablet 40 mEq  40 mEq Oral PRN Adelaide Petersen MD        Or    potassium chloride 10 mEq/100 mL IVPB (Peripheral Line)  10 mEq Intravenous PRN Adelaide Petersen MD        pantoprazole (PROTONIX) injection 40 mg  40 mg Intravenous Daily Adam Ring MD   40 mg at 12/29/20 1130    morphine sulfate (PF) injection 4 mg  4 mg Intravenous Q4H PRN Adam Ring MD   4 mg at 12/29/20 2325    Autologous Serum 20% OPH SOLN (eye drops, patient own supply)  1 drop Both Eyes 4x Daily Elin Rincon MD   1 drop at 12/29/20 3390 Allergies: Allergies   Allergen Reactions    Omnaris [Ciclesonide]        Problem List:    Patient Active Problem List   Diagnosis Code    Primary osteoarthritis of left knee M17.12    Genu valgum, acquired M25.65    Essential hypertension I10    [facility] M35.00    Vitamin D deficiency E55.9    Environmental allergies Z91.09    Mixed hyperlipidemia E78.2    Osteoporosis M81.0    Abdominal pain R10.9       Past Medical History:        Diagnosis Date    Arthritis     Dry eye     GERD (gastroesophageal reflux disease)     Hiatal hernia     Hyperlipidemia     Hypertension     Osteopenia     Osteoporosis     TIA (transient ischemic attack)        Past Surgical History:        Procedure Laterality Date    BACK SURGERY      scoliosis    CARPAL TUNNEL RELEASE      CYST REMOVAL      ESOPHAGEAL DILATATION      HYSTERECTOMY      Total    JOINT REPLACEMENT  09/26/1916    knee L       Social History:    Social History     Tobacco Use    Smoking status: Never Smoker    Smokeless tobacco: Never Used   Substance Use Topics    Alcohol use: No                                Counseling given: Not Answered      Vital Signs (Current):   Vitals:    12/29/20 1000 12/29/20 1653 12/29/20 2153 12/30/20 0508   BP: 137/62 (!) 129/58 132/60 130/60   Pulse: 84 95 50 89   Resp: 16 17 16 16   Temp: 37.5 °C (99.5 °F) 37 °C (98.6 °F) 36.9 °C (98.4 °F) 36.8 °C (98.2 °F)   TempSrc: Oral Oral Oral Oral   SpO2: 97% 93% 94% 92%   Weight:       Height:                                                  BP Readings from Last 3 Encounters:   12/30/20 130/60   11/15/19 139/64   11/14/18 (!) 147/72       NPO Status:                                                                                 BMI:   Wt Readings from Last 3 Encounters:   12/29/20 164 lb (74.4 kg)   11/15/19 164 lb (74.4 kg)   11/14/18 165 lb (74.8 kg)     Body mass index is 33.12 kg/m².     CBC:   Lab Results   Component Value Date    WBC 13.8 12/30/2020    RBC 3.63 12/30/2020    HGB 11.7 12/30/2020    HCT 35.5 12/30/2020    MCV 97.8 12/30/2020    RDW 13.6 12/30/2020     12/30/2020       CMP:   Lab Results   Component Value Date     12/29/2020    K 3.4 12/29/2020     12/29/2020    CO2 25 12/29/2020    BUN 14 12/29/2020    CREATININE 0.7 12/29/2020    GFRAA >60 12/29/2020    AGRATIO 1.5 07/05/2016    LABGLOM >60 12/29/2020    GLUCOSE 129 12/29/2020    PROT 6.9 12/29/2020    CALCIUM 8.3 12/29/2020    BILITOT 0.9 12/29/2020    ALKPHOS 68 12/29/2020    AST 19 12/29/2020    ALT 13 12/29/2020       POC Tests: No results for input(s): POCGLU, POCNA, POCK, POCCL, POCBUN, POCHEMO, POCHCT in the last 72 hours. Coags:   Lab Results   Component Value Date    PROTIME 10.2 06/16/2014    INR 0.94 06/16/2014    APTT 23.6 06/16/2014       HCG (If Applicable): No results found for: PREGTESTUR, PREGSERUM, HCG, HCGQUANT     ABGs: No results found for: PHART, PO2ART, SMO7QEB, RWS2FXM, BEART, G4TPASQX     Type & Screen (If Applicable):  No results found for: LABABO, LABRH    Drug/Infectious Status (If Applicable):  No results found for: HIV, HEPCAB    COVID-19 Screening (If Applicable):   Lab Results   Component Value Date    COVID19 NOT DETECTED 12/29/2020         Anesthesia Evaluation  Patient summary reviewed and Nursing notes reviewed  Airway: Mallampati: II  TM distance: >3 FB   Neck ROM: full  Mouth opening: > = 3 FB Dental:    (+) edentulous      Pulmonary:normal exam                               Cardiovascular:    (+) hypertension:, hyperlipidemia      ECG reviewed      Echocardiogram reviewed               ROS comment: Sinus rhythm with occasional premature ventricular complexes   Possible Anterior infarct , age undetermined   Abnormal ECG   No previous ECGs available   Confirmed by Rochelle Suarez ([id number]) on 12/29/2020 5:34:02 PM     IMPRESSION:  1. Technically difficult study. 2.  Mild left ventricular hypertrophy noted.   3.  Ejection fraction is greater than 55%. 4.  Mild left atrial enlargement noted. 5.  Grade 1 diastolic dysfunction present. 6.  Mild mitral and tricuspid regurgitation is present. Anais Armijo MD     [id number]  DD: 06/17/2014 13:04   DT: 06/17/2014 13:43   Job #: [id number]  CC: Anais Armijo MD  CC: Darryl Conner MD    Display only: Transcription ([id number]) on 6/17/2014  1:03 PM by Silvia Torres MD  Echocardiogram complete 2D with doppler with color  Study Date: 06/17/2014  Tomás Rung 80 y.o. Ordering Provider Sebastien Stewart,          Neuro/Psych:   (+) TIA,             GI/Hepatic/Renal:   (+) hiatal hernia, GERD:,           Endo/Other:    (+) : arthritis:., .          Pt had no PAT visit        ROS comment: sjogrens Abdominal:           Vascular:                                  Anesthesia Plan      general     ASA 3     (Covid -  Chart review only )  Induction: intravenous. MIPS: Postoperative opioids intended and Prophylactic antiemetics administered. Plan discussed with CRNA.     Attending anesthesiologist reviewed and agrees with Pre Eval content              JORDI Gil - CRNA   12/30/2020

## 2020-12-30 NOTE — PROGRESS NOTES
GENERAL SURGERY PROGRESS NOTE    Katiuska Cortez is a 77 y.o. female with lower abdominal pain and leukocytosis. Subjective:  Less pain this morning but more distended. No nausea or vomiting overnight. Passed some flatus, no BM. Objective:    Vitals: VITALS:  /60   Pulse 89   Temp 98.2 °F (36.8 °C) (Oral)   Resp 16   Ht 4' 11\" (1.499 m)   Wt 164 lb (74.4 kg)   SpO2 92%   BMI 33.12 kg/m²     I/O: 12/29 0701 - 12/30 0700  In: 700 [I.V.:700]  Out: 1150 [Urine:1150]    Labs/Imaging Results:   Lab Results   Component Value Date     12/29/2020    K 3.4 12/29/2020     12/29/2020    CO2 25 12/29/2020    BUN 14 12/29/2020    CREATININE 0.7 12/29/2020    GLUCOSE 129 12/29/2020    CALCIUM 8.3 12/29/2020      Lab Results   Component Value Date    WBC 15.1 (H) 12/29/2020    HGB 12.3 (L) 12/29/2020    HCT 36.7 (L) 12/29/2020    MCV 96.3 12/29/2020     12/29/2020       IV Fluids: sodium chloride Last Rate: 100 mL/hr at 12/29/20 2140    Scheduled Meds: sodium chloride flush, 10 mL, Intravenous, BID    atorvastatin, 10 mg, Oral, Nightly    [Held by provider] aspirin, 81 mg, Oral, BID    calcium-vitamin D, 2 tablet, Oral, Daily    hydroxychloroquine, 200 mg, Oral, BID    lisinopril-hydroCHLOROthiazide, 1 tablet, Oral, Daily    [Held by provider] methotrexate, 10 mg, Oral, Weekly    minocycline, 50 mg, Oral, Nightly    omega-3 acid ethyl esters, 1,000 mg, Oral, Daily    vitamin D, 1,000 Units, Oral, BID    sodium chloride flush, 10 mL, Intravenous, 2 times per day    enoxaparin, 40 mg, Subcutaneous, Daily    pantoprazole, 40 mg, Intravenous, Daily    Autologous Serum 20% OPH SOLN (eye drops, patient own supply), 1 drop, Both Eyes, 4x Daily    Physical Exam:  General: A&O x 3, no distress. HEENT: Anicteric sclerae, MMM. Extremities: No edema bilat LE.   Abdomen: Soft, moderately tender in the mid lower abdomen, remainder of the abdomen is benign, mildly distended with tympany      Assessment and Plan:  77 y.o. female with abdominal pain an leukocytosis. Upon further reviewing her CT scan I do feel there may be a blockage in her distal sigmoid colon. With her persistent pain I recommend exploratory laparotomy. Patient Active Problem List:     Primary osteoarthritis of left knee     Genu valgum, acquired     Essential hypertension     Sjogren's disease ([facility]     Vitamin D deficiency     Environmental allergies     Mixed hyperlipidemia     Osteoporosis     Abdominal pain      - To OR for exploratory laparotomy and indicated procedures. She was counseled on possible need for bowel resection or temporary colostomy.   - ancef/flagyl on call    Emilia Collazo MD

## 2020-12-30 NOTE — OP NOTE
Operative Note      Patient: Leo Cervantes  YOB: 1945  MRN: [id number]    Date of Procedure: 12/30/2020    Pre-Op Diagnosis: EX LAP    Post-Op Diagnosis: large bowel obstruction       Procedure(s):  LAPAROTOMY EXPLORATORY WITH DISTAL SIGMOID COLON RESECTION    Surgeon(s):  Abhay Benitez MD    Assistant:   First Assistant: JORDI Carrillo - CNP    Anesthesia: General    Estimated Blood Loss (mL): 60FI    Complications: None    Specimens:   ID Type Source Tests Collected by Time Destination   A : Distal sigmoid colon, stitch proximal Tissue Tissue SURGICAL PATHOLOGY Abhay Benitez MD 12/30/2020 1131        Implants:  * No implants in log *      Drains:   NG/OG/NJ/NE Tube Nasogastric Right nostril (Active)   Surrounding Skin Dry; Intact 12/30/20 1223   Securement device Yes 12/30/20 1223   Status Suction-low intermittent 12/30/20 1223   NG/OG/NJ/NE External Measurement (cm) 52 cm 12/30/20 1223   Drainage Appearance Clear 12/30/20 1223       Urethral Catheter (Active)   $ Urethral catheter insertion $ Not inserted for procedure 12/29/20 1848   Catheter Indications Perioperative use in selected surgeries including but not limited to urologic, pelvic or need for intraoperative monitoring of urinary output due to prolonged surgery, large volume infusion or need for diuretic therapy in surgery 12/30/20 1223   Urine Color Yellow 12/30/20 1223   Urine Appearance Clear 12/30/20 1223   Output (mL) 750 mL 12/29/20 1848       Findings: kinked loop of distal sigmoid colon, ~10cm of sigmoid colon resected with anastomosis    Detailed Description of Procedure:   Procedure Details   The patient was seen in the Holding Room. The risks, benefits, complications, treatment options, and expected outcomes were discussed with the patient.  The possibilities of reaction to medication, pulmonary aspiration, perforation of viscus, bleeding, recurrent infection, finding a normal colon, the need for additional procedures, failure to diagnose a condition, and creating a complication requiring transfusion or operation were discussed with the patient. The patient concurred with the proposed plan, giving informed consent. The patient was taken to the operating room, identified and the procedure verified as the consent form. A Time Out was held and the above information confirmed. Procedure Description    The patient was brought into the operating room placed supine. After induction of anesthesia the abdomen was prepped and draped in a sterile fashion. Christie catheter was inserted aseptically. A lower midline incision was made using the scalpel following her previous hysterectomy incision. A Bovie cautery was used to divided the subcutaneous tissue and the peritoneum was entered without incidence. The bowel was inspected and she was noted to have redundant sigmoid colon. A loop of distal sigmoid colon was red and inflamed but appeared viable. There was an abnormal kink in the colon at this location. Although the colon looked viable the bowel continued to want to kink at this location when returned to the abdomen. I decided to perform a sigmoid resection to remove this section of bowel. A window was made in the mesentery proximal and distal to the area of abnormal sigmoid. TLC 75 stapler with a blue load was used to transect the bowel. The mesentery was clamped and tied until the bowel was freed and handed off as specimen. Anastomosis was then performed. The proximal and distal bowel were placed in an overlapping orientation and tacked together with several 3-0 vicryl sutures. Adjacent enterotomies were made in the bowel with electrocautery. Another load of the TLC 75 stapler was inserted into the enterotomies and fired to creat a common channel. The open end of the anastomosis was closed with a 3-0 vicryl Connel stitch.  A second layer of 3-0 silk Lembert sutures were used to imbricate the repair. Copious irrigation of the abdominal cavity was performed and hemostasis was achieved to my satisfaction. I then went below and using a proctoscope inserted air into the rectum while it was clamped with fingers and inspected from above. This leak test was negative for bubbles. The abdomen was irrigated and suctioned. NG tube was palpated in good position. The abdominal wall fascia was closed using #1 looped PDS suture. The skin was closed with staples. The patient was ultimately transferred to recovery room in stable condition.     Electronically signed by Carly Kay MD on 12/31/2020 at 9:29 PM              Electronically signed by Carly Kay MD on 12/30/2020 at 12:37 PM

## 2020-12-31 NOTE — PROGRESS NOTES
Hospitalist Progress Note         Admit Date: 12/29/2020    PCP: Rickie Schirmer, PA-C     Chief Complaint   Patient presents with    Abdominal Pain    Emesis        Assessment and Plan:     -Abdominal pain/nausea and vomiting with no evidence of sepsis  S/p exploratory laparotomy and sigmoid resection. Pain control, IVF. Not on ABX. NGT in place  -Sjogren's syndrome we will restart methotrexate and Plaquenil when appropriate.  -Leukocytosis possibly from stress related. Monitor CBC while off antibiotics. -HTN continue lisinopril/HCTZ. Monitor vitals      VTE prophylaxis LMWH  Tolerating ice chips.     Current Facility-Administered Medications   Medication Dose Route Frequency Provider Last Rate Last Admin    docusate sodium (COLACE) capsule 100 mg  100 mg Oral BID Tucker Key MD   100 mg at 12/31/20 1207    influenza quadrivalent split vaccine (FLUZONE;FLUARIX;FLULAVAL;AFLURIA) injection 0.5 mL  0.5 mL Intramuscular Prior to discharge Maricruz Horowitz MD        sodium chloride (OCEAN, BABY AYR) 0.65 % nasal spray 1 spray  1 spray Each Nostril PRN Tucker Key MD        aspirin chewable tablet 81 mg  81 mg Oral BID Tucker Key MD   81 mg at 12/31/20 0935    oxyCODONE-acetaminophen (PERCOCET) 5-325 MG per tablet 1 tablet  1 tablet Oral Q4H PRN Tucker Key MD   1 tablet at 12/30/20 2331    morphine sulfate (PF) injection 4 mg  4 mg Intravenous Q2H PRN Tucker Key MD   4 mg at 12/31/20 1206    sodium chloride flush 0.9 % injection 10 mL  10 mL Intravenous BID Tucker Key MD   10 mL at 12/31/20 0941    ondansetron (ZOFRAN) injection 4 mg  4 mg Intravenous Q30 Min PRN Tucker Key MD   4 mg at 12/29/20 0804    atorvastatin (LIPITOR) tablet 10 mg  10 mg Oral Nightly Tucker Key MD   10 mg at 12/30/20 2024    calcium-vitamin D (OSCAL-500) 500-200 MG-UNIT per tablet 2 tablet  2 tablet Oral Daily Tucker Key MD   2 tablet at 12/31/20 1207  hydroxychloroquine (PLAQUENIL) tablet 200 mg  200 mg Oral BID Mackenzie Francisco MD   200 mg at 12/31/20 8326    lisinopril-hydroCHLOROthiazide (PRINZIDE;ZESTORETIC) 20-12.5 MG per tablet 1 tablet  1 tablet Oral Daily Mackenzie Francisco MD   1 tablet at 12/31/20 0935    [Held by provider] methotrexate (RHEUMATREX) chemo tablet 10 mg  10 mg Oral Weekly Mackenzie Francisco MD        minocycline (MINOCIN;DYNACIN) capsule 50 mg  50 mg Oral Nightly Mackenzie Francisco MD   50 mg at 12/30/20 2132    omega-3 acid ethyl esters (LOVAZA) capsule 1,000 mg  1,000 mg Oral Daily Mackenzie Francisco MD   1,000 mg at 12/31/20 0935    vitamin D CAPS 1,000 Units  1,000 Units Oral BID Mackenzie Francisco MD   1,000 Units at 12/31/20 0935    0.9 % sodium chloride infusion   Intravenous Continuous Mackenzie Francisco  mL/hr at 12/31/20 0933 New Bag at 12/31/20 0933    sodium chloride flush 0.9 % injection 10 mL  10 mL Intravenous 2 times per day Mackenzie Francisco MD   10 mL at 12/31/20 0941    sodium chloride flush 0.9 % injection 10 mL  10 mL Intravenous PRN Mackenzie Francisco MD        enoxaparin (LOVENOX) injection 40 mg  40 mg Subcutaneous Daily Mackenzie Francisco MD   40 mg at 12/31/20 0935    promethazine (PHENERGAN) tablet 12.5 mg  12.5 mg Oral Q6H PRN Mackenzie Francisco MD        Or    ondansetron [facility]) injection 4 mg  4 mg Intravenous Q6H PRN Mackenzie Francisco MD   4 mg at 12/29/20 1808    polyethylene glycol (GLYCOLAX) packet 17 g  17 g Oral Daily PRN Mackenzie Francisco MD        acetaminophen (TYLENOL) tablet 650 mg  650 mg Oral Q6H PRN Mackenzie Francisco MD        Or   [facility] acetaminophen (TYLENOL) suppository 650 mg  650 mg Rectal Q6H PRN Mackenzie Francisco MD        potassium chloride (KLOR-CON M) extended release tablet 40 mEq  40 mEq Oral PRN Mackenzie Nolan, MD        Or    potassium bicarb-citric acid (EFFER-K) effervescent tablet 40 mEq  40 mEq Oral PRROB Francisco MD Or    potassium chloride 10 mEq/100 mL IVPB (Peripheral Line)  10 mEq Intravenous PRN Nataly Jeff MD        pantoprazole (PROTONIX) injection 40 mg  40 mg Intravenous Daily Nataly Jeff MD   40 mg at 12/31/20 0935    Autologous Serum 20% OPH SOLN (eye drops, patient own supply)  1 drop Both Eyes 4x Daily Nataly Jeff MD   1 drop at 12/31/20 9204       Subjective:     S/p exploratory laparotomy postop day 1  NGT in place-not much output. No acute events since last night    Objective: Intake/Output Summary (Last 24 hours) at 12/31/2020 1502  Last data filed at 12/31/2020 7781  Gross per 24 hour   Intake 511.67 ml   Output 750 ml   Net -238.33 ml      Vitals:   Vitals:    12/31/20 0845   BP: (!) 140/61   Pulse: 93   Resp: 18   Temp: 97.4 °F (36.3 °C)   SpO2: 97%     Physical Exam:  General Appearance: Moderate distress +  Head:      Normocephalic, without obvious abnormality, atraumatic  Eyes:       Conjunctiva/corneas clear, EOM's intact  Lungs:    Clear to auscultation bilaterally, respirations unlabored  Heart:                Regular rate and rhythm, S1 and S2 normal, no murmur,   rub or gallop  Abdomen:     Soft, lower abdominal tenderness +, BS limited  Extremities:   Extremities normal, atraumatic, no cyanosis or edema  Neurological:   Grossly Intact. Significant Diagnostic Studies:   DATA:    CBC   Recent Labs     12/29/20 0215 12/29/20 1957 12/30/20  0644   WBC 20.0* 15.1* 13.8*   HGB 14.2 12.3* 11.7*   HCT 43.9 36.7* 35.5*    192 178      BMP   Recent Labs     12/29/20 0215 12/29/20 1957 12/30/20  0644   * 137 140   K 3.8 3.4* 3.4*   CL 98* 102 104   CO2 24 25 24   BUN 22 14 13   CREATININE 0.7 0.7 0.7     LFT'S   Recent Labs     12/29/20 0215   AST 19   ALT 13   BILITOT 0.9   ALKPHOS 68     COAG No results for input(s): INR in the last 72 hours.   POC: No results found for: POCGLU  [id number]:No results found for: LABA1C  CARDIAC ENZYMES  No results appearance. No evidence of pelvic free fluid is seen. Peritoneum/Retroperitoneum: Scattered mild intraperitoneal free fluid is noted. No evidence of intraperitoneal or retroperitoneal lymphadenopathy is identified. Bones/Soft Tissues: The bones, skeletal muscle bundles, fascial planes and subcutaneous soft tissues are unremarkable in appearance. 1. Colonic diverticulosis without definitive evidence of diverticulitis. 2. Mild cardiomegaly. 3. Hepatic steatosis. 4. Mild scattered intraperitoneal free fluid. Xr Chest Portable    Result Date: 12/29/2020  EXAMINATION: ONE XRAY VIEW OF THE CHEST 12/29/2020 2:17 am COMPARISON: Chest radiograph performed December 31, 2016. HISTORY: ORDERING SYSTEM PROVIDED HISTORY: abd pain, vomiting TECHNOLOGIST PROVIDED HISTORY: Reason for exam:->abd pain, vomiting Reason for Exam: abd pain, vomiting Acuity: Acute Type of Exam: Initial FINDINGS: The lungs are clear without focal consolidation, pleural effusion, or pneumothorax. Stable cardiomediastinal silhouette. S-shaped curvature of the visualized thoracolumbar spine. No acute cardiopulmonary findings. Xr Abdomen (2 Views)    Result Date: 12/29/2020  EXAMINATION: TWO XRAY VIEWS OF THE ABDOMEN 12/29/2020 8:59 pm COMPARISON: None. HISTORY: ORDERING SYSTEM PROVIDED HISTORY: abdominal pain, assess gas pattern, rule out free air TECHNOLOGIST PROVIDED HISTORY: Reason for exam:->abdominal pain, assess gas pattern, rule out free air Reason for Exam: abdominal pain, assess gas pattern, rule out free air Acuity: Acute Type of Exam: Initial FINDINGS: No subdeltoid free air. Gas throughout the small and large bowel. No abnormally dilated gas-filled loops of bowel. No pathologic calcification. Severe lumbar levoscoliosis. Mild-to-moderate degenerative changes of the sacroiliac joints. 1. No evidence of free air or small bowel obstruction. 2. Gas throughout the small and large bowel may reflect ileus.            Vianney Arteaga [employer]

## 2020-12-31 NOTE — PROGRESS NOTES
Occupational [address] [place] [address] ACUTE CARE OCCUPATIONAL THERAPY EVALUATION    Carissa Pereira, 1945, 4108/4108-A, 12/31/2020    Discharge Recommendation: Home with initial 24 hour supervision/assistance, Home Health OT services (S Level 1)      History:  [place]:  The primary encounter diagnosis was Lower abdominal pain. Diagnoses of Non-intractable vomiting with nausea, unspecified vomiting type and Leukocytosis, unspecified type were also pertinent to this visit. Subjective:  Patient states: \"I need to get up and moving! \"  Pain: Pt reported 7/10 surgical pain in abdomen  Communication with other providers: PT Henry De Jesus  Restrictions: General Precautions, Low Fall Risk, Abdominal Precautions, IV, Christie, Pocket Telemetry, SCDs, Bed/chair alarm    Home Setup/Prior level of function:  Social/Functional History  Lives With: Spouse (in good health and available 24/7)  Type of Home: House  Home Layout: One level  Home Access: Stairs to enter with rails  Entrance Stairs - Number of Steps: 14 (from basement level garage)  Entrance Stairs - Rails: Right  Bathroom Shower/Tub: Tub/Shower unit  Bathroom Toilet: Handicap height  Bathroom Equipment: Grab bars in shower  Bathroom Accessibility: Accessible  Home Equipment: Rolling walker, 4 wheeled walker  ADL Assistance: Independent  Homemaking Assistance: Independent  Homemaking Responsibilities: Yes  Ambulation Assistance: Independent (uses no AD, briefly used 4ww after TKA)  Transfer Assistance: Independent  Active : Yes  Occupation: Homemaker    Examination:  · Observation: Supine in bed upon arrival. Pleasant and agreeable to evaluation.    · Vision: WFL (Glasses)  · Hearing: [facility]  · Vitals: Stable vitals throughout session    Body Systems and functions:  · ROM: Active shoulder flexion grossly 0-80' with compensatory movements observed, WNL distally in BL UEs  · Strength: 4/5 MMT elbow flexors, extensors, and grasp  · Sensation: [facility] (denies numbness/tingling)  · Tone: Normal  · Coordination: WFL for ADLs  · Perception: WNL    Activities of Daily Living (ADLs):  · Feeding: Independent (full liquid diet)  · Grooming: CGA (able to complete in standing at sink with min cues for safe RW placement)  · UB bathing: SBA   · LB bathing: Mod A (reaching distal BL LEs; may benefit from initial use of long handled sponge)  · UB dressing: SBA (donning clean robe seated EOB)  · LB dressing: Max A (dependent with donning BL socks/shoes, able to participate in mgmt of clothing to hips in standing)  · Toileting: Min A (dynamic standing balance with clothing mgmt both directions)    Cognitive and Psychosocial Functioning:  · Overall cognitive status: WNL  · Affect: Normal     Balance:   · Sitting: SBA in unsupported sitting EOB  · Standing: CGA with RW    Functional Mobility:  · Bed Mobility: Min A supine to sitting EOB (utilized log roll technique with min coaching, min trunk boost required for uprighting)  · Transfers: Min A sit to stand from bed (limited by pain, min cues for safe hand placement), CGA stand to sit to chair (min cues for reaching back with arms)  · Ambulation: CGA with  ft; mildly decreased trunk extension/upright posture but steady gait, no LOB, good tolerance      AM-PAC 6 click short form for inpatient daily activity:   How much help from another person does the patient currently need. .. Unable  Dep A Lot  Max A A Lot   Mod A A Little  Min A A Little   CGA  SBA None   Mod I  Indep  Sup   1. Putting on and taking off regular lower body clothing? [] 1    [x] 2   [] 2   [] 3   [] 3   [] 4      2. Bathing (including washing, rinsing, drying)? [] 1   [] 2   [x] 2 [] 3 [] 3 [] 4   3. Toileting, which includes using toilet, bedpan, or urinal? [] 1    [] 2   [] 2   [x] 3   [] 3   [] 4     4. Putting on and taking off regular upper body clothing? [] 1   [] 2   [] 2   [] 3   [x] 3    [] 4      5.  Taking care of personal grooming such as brushing teeth? [] 1   [] 2    [] 2 [] 3    [x] 3   [] 4      6. Eating meals? [] 1   [] 2   [] 2   [] 3   [] 3   [x] 4      Raw Score:  17    [24=0% impaired(CH), 23=1-19%(CI), 20-22=20-39%(CJ), 15-19=40-59%(CK), 10-14=60-79%(CL), 7-9=80-99%(CM), 6=100%(CN)]     Treatment:  Therapeutic Activity Training:   Therapeutic activity training was instructed today. Cues were given for safety, sequence, UE/LE placement, awareness, and balance. Activities performed today included bed mobility training using log roll technique, sitting balance/tolerance, transfer training, HH mobility with RW, education on role of OT, POC, abdominal precautions/log rolling, importance of OOB activity, d/c planning      Safety Measures: Gait belt used, Left in Chair, Alarm in place    Assessment:  Pt is a 77year old female with a past medical history of Arthritis, Dry eye, GERD (gastroesophageal reflux disease), Hiatal hernia, Hyperlipidemia, Hypertension, Osteopenia, Osteoporosis, and TIA (transient ischemic attack). Pt admitted with abdominal pain and diagnosed with a large bowel obstruction. Pt underwent ex-lap with sigmoid colon resection on 12-30. Pt lives at home with her spouse and at baseline is fully independent with ADLs, IADLs, mobility, and driving. Pt performed well this date for her first time out of bed since surgery. Recommend d/c to home with initial 24 hour supervision and [facility] OT services recommended. Complexity: Moderate  Prognosis: Good  Plan: 3x/week      Goals:  1. Pt will complete all aspects of bed mobility for EOB/OOB ADLs mod I using log roll technique   2. Pt will complete UB/LB bathing min A with setup using long handled sponge PRN  3. Pt will complete all aspects of LB dressing min A with setup using AE PRN  4. Pt will complete all functional transfers to and from bed, chair, toilet, shower chair SBA/good safety awareness  5. Pt will ambulate HH distance to bathroom for toileting SBA with RW  6.  Pt will complete all aspects of toileting task CGA  7.  Pt will complete oral hygiene/grooming routine in standing at sink SBA with no seated rest breaks      Time:   Time in: 1052  Time out: 1122  Timed treatment minutes: 10  Total time: 30      Electronically signed by:    TADEO Guadalupe/L, [address] [place] [phone]

## 2020-12-31 NOTE — PROGRESS NOTES
GENERAL SURGERY PROGRESS NOTE    Moncho Sow is a 77 y.o. female with lower abdominal pain and leukocytosis. S/p                 Subjective:  Doing ok this morning. Minimal from NG. Pain controlled. Denies complaints. Objective:    Vitals: VITALS:  BP (!) 140/61   Pulse 93   Temp 97.4 °F (36.3 °C) (Oral)   Resp 18   Ht 4' 11\" (1.499 m)   Wt 164 lb (74.4 kg)   SpO2 97%   BMI 33.12 kg/m²     I/O: 12/30 0701 - 12/31 0700  In: 1511.7 [I.V.:1511.7]  Out: 1000 [Urine:950]    Labs/Imaging Results:   Lab Results   Component Value Date     12/30/2020    K 3.4 12/30/2020     12/30/2020    CO2 24 12/30/2020    BUN 13 12/30/2020    CREATININE 0.7 12/30/2020    GLUCOSE 98 12/30/2020    CALCIUM 8.3 12/30/2020      Lab Results   Component Value Date    WBC 13.8 (H) 12/30/2020    HGB 11.7 (L) 12/30/2020    HCT 35.5 (L) 12/30/2020    MCV 97.8 12/30/2020     12/30/2020       IV Fluids: sodium chloride Last Rate: 100 mL/hr at 12/31/20 8297    Scheduled Meds:   docusate sodium, 100 mg, Oral, BID    aspirin, 81 mg, Oral, BID    sodium chloride flush, 10 mL, Intravenous, BID    atorvastatin, 10 mg, Oral, Nightly    calcium-vitamin D, 2 tablet, Oral, Daily    hydroxychloroquine, 200 mg, Oral, BID    lisinopril-hydroCHLOROthiazide, 1 tablet, Oral, Daily    [Held by provider] methotrexate, 10 mg, Oral, Weekly    minocycline, 50 mg, Oral, Nightly    omega-3 acid ethyl esters, 1,000 mg, Oral, Daily    vitamin D, 1,000 Units, Oral, BID    sodium chloride flush, 10 mL, Intravenous, 2 times per day    enoxaparin, 40 mg, Subcutaneous, Daily    pantoprazole, 40 mg, Intravenous, Daily    Autologous Serum 20% OPH SOLN (eye drops, patient own supply), 1 drop, Both Eyes, 4x Daily    Physical Exam:  General: A&O x 3, no distress. HEENT: Anicteric sclerae, MMM. Extremities: No edema bilat LE.   Abdomen: Soft, incision dressing intact, appropriately tender      Assessment and Plan:  77 y.o. female with abdominal pain an leukocytosis. POD 1 from ex lap with sigmoid resection and primary anastomosis.     Patient Active Problem List:     Primary osteoarthritis of left knee     Genu valgum, acquired     Essential hypertension     Sjogren's disease ([facility] [address]     Vitamin D deficiency     Environmental allergies     Mixed hyperlipidemia     Osteoporosis     Abdominal pain      - NG removed  - will d/c powers  - clear liquids and toast OK  - increase ambulation  - await return of bowel function    Orly Dawson MD

## 2020-12-31 NOTE — PROGRESS NOTES
Physical Therapy  [facility] ACUTE CARE PHYSICAL THERAPY EVALUATION  Jennie Thomas, 1945, 4108/4108-A, 12/31/2020    History  [place]:  The primary encounter diagnosis was Lower abdominal pain. Diagnoses of Non-intractable vomiting with nausea, unspecified vomiting type and Leukocytosis, unspecified type were also pertinent to this visit. Patient  has a past medical history of Arthritis, Dry eye, GERD (gastroesophageal reflux disease), Hiatal hernia, Hyperlipidemia, Hypertension, Osteopenia, Osteoporosis, and TIA (transient ischemic attack). Patient  has a past surgical history that includes back surgery; Hysterectomy; joint replacement (09/26/1916); Carpal tunnel release; Esophagus dilation; cyst removal; and laparotomy (N/A, 12/30/2020). Subjective:  Patient states:  \"I know this is going to hurt\"   Pain:  8/10 abdomen   Communication with other providers:  co-eval with Federico SHEA   Restrictions: abdominal precautions,     Home Setup/Prior level of function  Social/Functional History  Lives With: Spouse(in good health and available 24/7)  Type of Home: House  Home Layout: One level  Home Access: Stairs to enter with rails  Entrance Stairs - Number of Steps: 14 (from basement level garage)  Entrance Stairs - Rails: Right  Bathroom Shower/Tub: Tub/Shower unit  Bathroom Toilet: Handicap height  Bathroom Equipment: Grab bars in shower  Bathroom Accessibility: Accessible  Home Equipment: Rolling walker, 4 wheeled walker  ADL Assistance: [facility] [address]: Independent  Homemaking Responsibilities: Yes  Ambulation Assistance: Independent(uses no AD, briefly used 4ww after TKA)  Transfer Assistance: Independent  Active : Yes  Occupation: Retired    Examination of body systems (includes body structures/functions, activity/participation limitations):  · Observation:  Supine in bed upon arrival. Cooperative to work with therapy.    · [facility]   · Hearing:  [facility] · Cardiopulmonary:  Stable vitals throughout session. Musculoskeletal  · ROM R/L:  WFL BLEs    · Strength R/L:  BLEs grossly 4+/5. Minimal strength deficits observed in function and endurance. Mobility/treatment:   · Rolling L/R:  NT   · Supine to sit:  Bri for trunk boost, cued for sequencing partial roll to protect abdomen   · Transfers:   · Sit to stand: Bri from EOB. Cues for sequencing UEs from bed to RW  · Stand to sit: Bri for controlling sit to low recliner. Cues for hand sequencing back to arm rests   · Step pivot: CGA for safety with RW   · Sitting balance:  SBA at EOB, static and light dynamic without UE support   · Standing balance: CGA at RW   · Gait: ~200ft with RW CGA for safety. [address] pace initially increasing as distance increased. Slightly fwd posture (hx of scoliosis). No LOB noted. · Educated pt on POC, role of PT, DME use, discharge recommendations. VCs for sequencing to inc safety and indep with mobility     [facility] 6 Clicks Inpatient Mobility:  AM-PAC Inpatient Mobility Raw Score : 18     Safety: patient left in chair, call light within reach, gait belt used. Assessment:  Pt is a 77year old female admitted with abdominal pain. She is s/p ex lap with sigmoid colon resection on 12/30. Recommend home with initial 24/7 supervision and [facility] once medically stable. Pt has a supportive spouse in good health. Recommend use of RW. At baseline she is indep with gross mobility and ADLs. She performed well this date though below her baseline. She would benefit from continued therapy to address her current deficits, dec potential fall risk, and restore PLOF. Complexity: Moderate  Prognosis: Good, no significant barriers to participation at this time.    Plan Times per week: 4+/week, 1 week   Discharge Recommendations: 24 hour supervision or assist, Home with Home health PT  Equipment: no needs     Goals:  Short term goals  Time Frame for Short term goals: 1 week  Short term goal 1: Pt will perform sit><supine SBA  Short term goal 2: Pt will transfer to all surfaces SBA  Short term goal 3: Pt will ambulate 200ft with LRAD SBA  Short term goal 4: Pt will ascend/descend 1 flight of stairs, single rail SBA       Treatment plan:  Bed mobility, transfers, balance, gait, TA, TX, stairs     Recommendations for NURSING mobility: ambulate with RW     Time:   Time in: 1051  Time out: 1118  Timed treatment minutes: 15  Total time: 27    Electronically signed by:    Miller Betancourt [id number]  12/31/2020, 1:01 PM

## 2021-01-01 ENCOUNTER — APPOINTMENT (OUTPATIENT)
Dept: GENERAL RADIOLOGY | Age: 76
DRG: 329 | End: 2021-01-01
Payer: MEDICARE

## 2021-01-01 ENCOUNTER — ANESTHESIA EVENT (OUTPATIENT)
Dept: OPERATING ROOM | Age: 76
DRG: 329 | End: 2021-01-01
Payer: MEDICARE

## 2021-01-01 ENCOUNTER — ANESTHESIA EVENT (OUTPATIENT)
Dept: MEDSURG UNIT | Age: 76
DRG: 329 | End: 2021-01-01
Payer: MEDICARE

## 2021-01-01 ENCOUNTER — APPOINTMENT (OUTPATIENT)
Dept: INTERVENTIONAL RADIOLOGY/VASCULAR | Age: 76
DRG: 329 | End: 2021-01-01
Payer: MEDICARE

## 2021-01-01 ENCOUNTER — APPOINTMENT (OUTPATIENT)
Dept: CT IMAGING | Age: 76
DRG: 329 | End: 2021-01-01
Payer: MEDICARE

## 2021-01-01 ENCOUNTER — ANESTHESIA (OUTPATIENT)
Dept: MEDSURG UNIT | Age: 76
DRG: 329 | End: 2021-01-01
Payer: MEDICARE

## 2021-01-01 ENCOUNTER — ANESTHESIA (OUTPATIENT)
Dept: OPERATING ROOM | Age: 76
DRG: 329 | End: 2021-01-01
Payer: MEDICARE

## 2021-01-01 VITALS
SYSTOLIC BLOOD PRESSURE: 96 MMHG | OXYGEN SATURATION: 100 % | RESPIRATION RATE: 16 BRPM | DIASTOLIC BLOOD PRESSURE: 69 MMHG | TEMPERATURE: 96.1 F

## 2021-01-01 VITALS
DIASTOLIC BLOOD PRESSURE: 60 MMHG | HEIGHT: 59 IN | SYSTOLIC BLOOD PRESSURE: 78 MMHG | WEIGHT: 164 LBS | BODY MASS INDEX: 33.06 KG/M2 | OXYGEN SATURATION: 43 % | TEMPERATURE: 91.2 F

## 2021-01-01 LAB
ABO/RH: NORMAL
ALBUMIN SERPL-MCNC: 1.8 GM/DL (ref 3.4–5)
ALBUMIN SERPL-MCNC: 1.9 GM/DL (ref 3.4–5)
ALP BLD-CCNC: 32 IU/L (ref 40–129)
ALP BLD-CCNC: 56 IU/L (ref 40–128)
ALT SERPL-CCNC: 33 U/L (ref 10–40)
ALT SERPL-CCNC: 46 U/L (ref 10–40)
AMMONIA: 40 UMOL/L (ref 11–51)
ANION GAP SERPL CALCULATED.3IONS-SCNC: 12 MMOL/L (ref 4–16)
ANION GAP SERPL CALCULATED.3IONS-SCNC: 13 MMOL/L (ref 4–16)
ANION GAP SERPL CALCULATED.3IONS-SCNC: 15 MMOL/L (ref 4–16)
ANION GAP SERPL CALCULATED.3IONS-SCNC: 15 MMOL/L (ref 4–16)
ANION GAP SERPL CALCULATED.3IONS-SCNC: 18 MMOL/L (ref 4–16)
ANION GAP SERPL CALCULATED.3IONS-SCNC: 18 MMOL/L (ref 4–16)
ANION GAP SERPL CALCULATED.3IONS-SCNC: 7 MMOL/L (ref 4–16)
ANION GAP SERPL CALCULATED.3IONS-SCNC: 7 MMOL/L (ref 4–16)
ANTIBODY SCREEN: NEGATIVE
APTT: 23.7 SECONDS (ref 25.1–37.1)
AST SERPL-CCNC: 104 IU/L (ref 15–37)
AST SERPL-CCNC: 148 IU/L (ref 15–37)
ATYPICAL LYMPHOCYTE ABSOLUTE COUNT: ABNORMAL
BANDED NEUTROPHILS ABSOLUTE COUNT: 0.97 K/CU MM
BANDED NEUTROPHILS RELATIVE PERCENT: 11 % (ref 5–11)
BASE EXCESS: 10 (ref 0–2.4)
BASE EXCESS: 10 (ref 0–2.4)
BASE EXCESS: 11 (ref 0–2.4)
BASE EXCESS: 11 (ref 0–2.4)
BASE EXCESS: 6 (ref 0–2.4)
BASOPHILS ABSOLUTE: 0.3 K/CU MM
BASOPHILS RELATIVE PERCENT: 3 % (ref 0–1)
BILIRUB SERPL-MCNC: 0.7 MG/DL (ref 0–1)
BILIRUB SERPL-MCNC: 1.2 MG/DL (ref 0–1)
BUN BLDV-MCNC: 10 MG/DL (ref 6–23)
BUN BLDV-MCNC: 10 MG/DL (ref 6–23)
BUN BLDV-MCNC: 11 MG/DL (ref 6–23)
BUN BLDV-MCNC: 13 MG/DL (ref 6–23)
BUN BLDV-MCNC: 13 MG/DL (ref 6–23)
BUN BLDV-MCNC: 14 MG/DL (ref 6–23)
BUN BLDV-MCNC: 15 MG/DL (ref 6–23)
BUN BLDV-MCNC: 5 MG/DL (ref 6–23)
CALCIUM IONIZED: 3.48 MG/DL (ref 4.48–5.28)
CALCIUM IONIZED: 4.08 MG/DL (ref 4.48–5.28)
CALCIUM IONIZED: 4.2 MG/DL (ref 4.48–5.28)
CALCIUM IONIZED: 5.28 MG/DL (ref 4.48–5.28)
CALCIUM IONIZED: 5.76 MG/DL (ref 4.48–5.28)
CALCIUM SERPL-MCNC: 6 MG/DL (ref 8.3–10.6)
CALCIUM SERPL-MCNC: 6.3 MG/DL (ref 8.3–10.6)
CALCIUM SERPL-MCNC: 6.5 MG/DL (ref 8.3–10.6)
CALCIUM SERPL-MCNC: 7 MG/DL (ref 8.3–10.6)
CALCIUM SERPL-MCNC: 7.1 MG/DL (ref 8.3–10.6)
CALCIUM SERPL-MCNC: 7.8 MG/DL (ref 8.3–10.6)
CALCIUM SERPL-MCNC: 8.3 MG/DL (ref 8.3–10.6)
CALCIUM SERPL-MCNC: 9.7 MG/DL (ref 8.3–10.6)
CARBON MONOXIDE, BLOOD: 0.7 % (ref 0–5)
CARBON MONOXIDE, BLOOD: 0.9 % (ref 0–5)
CARBON MONOXIDE, BLOOD: 1.1 % (ref 0–5)
CARBON MONOXIDE, BLOOD: 1.2 % (ref 0–5)
CARBON MONOXIDE, BLOOD: 1.3 % (ref 0–5)
CHLORIDE BLD-SCNC: 101 MMOL/L (ref 99–110)
CHLORIDE BLD-SCNC: 104 MMOL/L (ref 99–110)
CHLORIDE BLD-SCNC: 109 MMOL/L (ref 99–110)
CHLORIDE BLD-SCNC: 90 MMOL/L (ref 99–110)
CHLORIDE BLD-SCNC: 93 MMOL/L (ref 99–110)
CHLORIDE BLD-SCNC: 96 MMOL/L (ref 99–110)
CO2 CONTENT: 15.2 MMOL/L (ref 19–24)
CO2 CONTENT: 16.2 MMOL/L (ref 19–24)
CO2 CONTENT: 17.7 MMOL/L (ref 19–24)
CO2 CONTENT: 19.5 MMOL/L (ref 19–24)
CO2 CONTENT: 20.2 MMOL/L (ref 19–24)
CO2: 14 MMOL/L (ref 21–32)
CO2: 16 MMOL/L (ref 21–32)
CO2: 17 MMOL/L (ref 21–32)
CO2: 17 MMOL/L (ref 21–32)
CO2: 21 MMOL/L (ref 21–32)
CO2: 21 MMOL/L (ref 21–32)
CO2: 22 MMOL/L (ref 21–32)
CO2: 29 MMOL/L (ref 21–32)
COMMENT: ABNORMAL
CREAT SERPL-MCNC: 0.5 MG/DL (ref 0.6–1.1)
CREAT SERPL-MCNC: 0.6 MG/DL (ref 0.6–1.1)
CREAT SERPL-MCNC: 0.8 MG/DL (ref 0.6–1.1)
CREAT SERPL-MCNC: 0.8 MG/DL (ref 0.6–1.1)
CREAT SERPL-MCNC: 1 MG/DL (ref 0.6–1.1)
CREAT SERPL-MCNC: 1.1 MG/DL (ref 0.6–1.1)
DIFFERENTIAL TYPE: ABNORMAL
DOHLE BODIES: PRESENT
EKG ATRIAL RATE: 114 BPM
EKG DIAGNOSIS: NORMAL
EKG P AXIS: 105 DEGREES
EKG P-R INTERVAL: 138 MS
EKG Q-T INTERVAL: 356 MS
EKG QRS DURATION: 88 MS
EKG QTC CALCULATION (BAZETT): 490 MS
EKG R AXIS: 138 DEGREES
EKG T AXIS: 177 DEGREES
EKG VENTRICULAR RATE: 114 BPM
ESTIMATED AVERAGE GLUCOSE: 108 MG/DL
GFR AFRICAN AMERICAN: 59 ML/MIN/1.73M2
GFR AFRICAN AMERICAN: >60 ML/MIN/1.73M2
GFR NON-AFRICAN AMERICAN: 49 ML/MIN/1.73M2
GFR NON-AFRICAN AMERICAN: 54 ML/MIN/1.73M2
GFR NON-AFRICAN AMERICAN: >60 ML/MIN/1.73M2
GIANT PLATELETS: PRESENT
GLUCOSE BLD-MCNC: 110 MG/DL (ref 70–99)
GLUCOSE BLD-MCNC: 125 MG/DL (ref 70–99)
GLUCOSE BLD-MCNC: 177 MG/DL (ref 70–99)
GLUCOSE BLD-MCNC: 290 MG/DL (ref 70–99)
GLUCOSE BLD-MCNC: 294 MG/DL (ref 70–99)
GLUCOSE BLD-MCNC: 295 MG/DL (ref 70–99)
GLUCOSE BLD-MCNC: 303 MG/DL (ref 70–99)
GLUCOSE BLD-MCNC: 375 MG/DL (ref 70–99)
GLUCOSE BLD-MCNC: 90 MG/DL (ref 70–99)
HBA1C MFR BLD: 5.4 % (ref 4.2–6.3)
HCO3 ARTERIAL: 14.2 MMOL/L (ref 18–23)
HCO3 ARTERIAL: 15.2 MMOL/L (ref 18–23)
HCO3 ARTERIAL: 16.6 MMOL/L (ref 18–23)
HCO3 ARTERIAL: 18.5 MMOL/L (ref 18–23)
HCO3 ARTERIAL: 18.7 MMOL/L (ref 18–23)
HCT VFR BLD CALC: 30.8 % (ref 37–47)
HCT VFR BLD CALC: 33.8 % (ref 37–47)
HCT VFR BLD CALC: 42.2 % (ref 37–47)
HCT VFR BLD CALC: 45 % (ref 37–47)
HEMOGLOBIN: 10.1 GM/DL (ref 12.5–16)
HEMOGLOBIN: 10.9 GM/DL (ref 12.5–16)
HEMOGLOBIN: 13.7 GM/DL (ref 12.5–16)
HEMOGLOBIN: 14.5 GM/DL (ref 12.5–16)
INR BLD: 1.34 INDEX
IONIZED CA: 0.87 MMOL/L (ref 1.12–1.32)
IONIZED CA: 1.02 MMOL/L (ref 1.12–1.32)
IONIZED CA: 1.05 MMOL/L (ref 1.12–1.32)
IONIZED CA: 1.32 MMOL/L (ref 1.12–1.32)
IONIZED CA: 1.44 MMOL/L (ref 1.12–1.32)
LACTATE: 7.3 MMOL/L (ref 0.4–2)
LACTATE: 8.6 MMOL/L (ref 0.4–2)
LACTATE: 9.6 MMOL/L (ref 0.4–2)
LV EF: 20 %
LVEF MODALITY: NORMAL
LYMPHOCYTES ABSOLUTE: 2.8 K/CU MM
LYMPHOCYTES RELATIVE PERCENT: 33 % (ref 24–44)
MAGNESIUM: 1.6 MG/DL (ref 1.8–2.4)
MAGNESIUM: 1.7 MG/DL (ref 1.8–2.4)
MAGNESIUM: 1.8 MG/DL (ref 1.8–2.4)
MAGNESIUM: 1.8 MG/DL (ref 1.8–2.4)
MAGNESIUM: 2 MG/DL (ref 1.8–2.4)
MAGNESIUM: 2.2 MG/DL (ref 1.8–2.4)
MAGNESIUM: 2.3 MG/DL (ref 1.8–2.4)
MCH RBC QN AUTO: 31.5 PG (ref 27–31)
MCH RBC QN AUTO: 31.9 PG (ref 27–31)
MCH RBC QN AUTO: 31.9 PG (ref 27–31)
MCH RBC QN AUTO: 32.2 PG (ref 27–31)
MCHC RBC AUTO-ENTMCNC: 32.2 % (ref 32–36)
MCHC RBC AUTO-ENTMCNC: 32.2 % (ref 32–36)
MCHC RBC AUTO-ENTMCNC: 32.5 % (ref 32–36)
MCHC RBC AUTO-ENTMCNC: 32.8 % (ref 32–36)
MCV RBC AUTO: 97.2 FL (ref 78–100)
MCV RBC AUTO: 97.7 FL (ref 78–100)
MCV RBC AUTO: 99.1 FL (ref 78–100)
MCV RBC AUTO: 99.1 FL (ref 78–100)
METAMYELOCYTES ABSOLUTE COUNT: 1.94 K/CU MM
METAMYELOCYTES PERCENT: 22 %
METHEMOGLOBIN ARTERIAL: 0 %
METHEMOGLOBIN ARTERIAL: 1 %
METHEMOGLOBIN ARTERIAL: 1.4 %
METHEMOGLOBIN ARTERIAL: 1.5 %
METHEMOGLOBIN ARTERIAL: 1.5 %
MONOCYTES ABSOLUTE: 0.3 K/CU MM
MONOCYTES RELATIVE PERCENT: 3 % (ref 0–4)
MYELOCYTE PERCENT: 4 %
MYELOCYTES ABSOLUTE COUNT: 0.35 K/CU MM
O2 SATURATION: 90.1 % (ref 96–97)
O2 SATURATION: 91.5 % (ref 96–97)
O2 SATURATION: 92 % (ref 96–97)
O2 SATURATION: 93 % (ref 96–97)
O2 SATURATION: 93.6 % (ref 96–97)
OSMOLALITY: 277 MOS/L (ref 280–300)
PCO2 ARTERIAL: 31 MMHG (ref 32–45)
PCO2 ARTERIAL: 32 MMHG (ref 32–45)
PCO2 ARTERIAL: 33 MMHG (ref 32–45)
PCO2 ARTERIAL: 37 MMHG (ref 32–45)
PCO2 ARTERIAL: 49 MMHG (ref 32–45)
PDW BLD-RTO: 13 % (ref 11.7–14.9)
PDW BLD-RTO: 13.2 % (ref 11.7–14.9)
PDW BLD-RTO: 13.4 % (ref 11.7–14.9)
PDW BLD-RTO: 13.4 % (ref 11.7–14.9)
PH BLOOD: 7.19 (ref 7.34–7.45)
PH BLOOD: 7.26 (ref 7.34–7.45)
PH BLOOD: 7.27 (ref 7.34–7.45)
PH BLOOD: 7.27 (ref 7.34–7.45)
PH BLOOD: 7.37 (ref 7.34–7.45)
PHOSPHORUS: 3.1 MG/DL (ref 2.5–4.9)
PHOSPHORUS: 3.2 MG/DL (ref 2.5–4.9)
PHOSPHORUS: 3.8 MG/DL (ref 2.5–4.9)
PHOSPHORUS: 4.2 MG/DL (ref 2.5–4.9)
PHOSPHORUS: 4.3 MG/DL (ref 2.5–4.9)
PHOSPHORUS: 4.4 MG/DL (ref 2.5–4.9)
PLATELET # BLD: 175 K/CU MM (ref 140–440)
PLATELET # BLD: 208 K/CU MM (ref 140–440)
PLATELET # BLD: 277 K/CU MM (ref 140–440)
PLATELET # BLD: 307 K/CU MM (ref 140–440)
PMV BLD AUTO: 10.3 FL (ref 7.5–11.1)
PMV BLD AUTO: 9.1 FL (ref 7.5–11.1)
PMV BLD AUTO: 9.3 FL (ref 7.5–11.1)
PMV BLD AUTO: 9.7 FL (ref 7.5–11.1)
PO2 ARTERIAL: 64 MMHG (ref 75–100)
PO2 ARTERIAL: 65 MMHG (ref 75–100)
PO2 ARTERIAL: 65 MMHG (ref 75–100)
PO2 ARTERIAL: 75 MMHG (ref 75–100)
PO2 ARTERIAL: 84 MMHG (ref 75–100)
POLYCHROMASIA: ABNORMAL
POTASSIUM SERPL-SCNC: 3 MMOL/L (ref 3.5–5.1)
POTASSIUM SERPL-SCNC: 3.5 MMOL/L (ref 3.5–5.1)
POTASSIUM SERPL-SCNC: 3.5 MMOL/L (ref 3.5–5.1)
POTASSIUM SERPL-SCNC: 3.6 MMOL/L (ref 3.5–5.1)
POTASSIUM SERPL-SCNC: 3.6 MMOL/L (ref 3.5–5.1)
POTASSIUM SERPL-SCNC: 3.8 MMOL/L (ref 3.5–5.1)
POTASSIUM SERPL-SCNC: 4.1 MMOL/L (ref 3.5–5.1)
POTASSIUM SERPL-SCNC: 4.2 MMOL/L (ref 3.5–5.1)
PRO-BNP: ABNORMAL PG/ML
PROCALCITONIN: 0.47
PROMYELOCYTES ABSOLUTE COUNT: 1.14 K/CU MM
PROMYELOCYTES PERCENT: 13 %
PROTHROMBIN TIME: 16.3 SECONDS (ref 11.7–14.5)
RBC # BLD: 3.17 M/CU MM (ref 4.2–5.4)
RBC # BLD: 3.46 M/CU MM (ref 4.2–5.4)
RBC # BLD: 4.26 M/CU MM (ref 4.2–5.4)
RBC # BLD: 4.54 M/CU MM (ref 4.2–5.4)
SEGMENTED NEUTROPHILS ABSOLUTE COUNT: 1 K/CU MM
SEGMENTED NEUTROPHILS RELATIVE PERCENT: 11 % (ref 36–66)
SODIUM BLD-SCNC: 118 MMOL/L (ref 135–145)
SODIUM BLD-SCNC: 123 MMOL/L (ref 135–145)
SODIUM BLD-SCNC: 125 MMOL/L (ref 135–145)
SODIUM BLD-SCNC: 130 MMOL/L (ref 135–145)
SODIUM BLD-SCNC: 138 MMOL/L (ref 135–145)
SODIUM BLD-SCNC: 140 MMOL/L (ref 135–145)
SODIUM BLD-SCNC: 140 MMOL/L (ref 135–145)
TOTAL PROTEIN: 2.7 GM/DL (ref 6.4–8.2)
TOTAL PROTEIN: 3.6 GM/DL (ref 6.4–8.2)
TOXIC GRANULATION: PRESENT
TROPONIN T: 0.03 NG/ML
WBC # BLD: 3.8 K/CU MM (ref 4–10.5)
WBC # BLD: 5.2 K/CU MM (ref 4–10.5)
WBC # BLD: 8.5 K/CU MM (ref 4–10.5)
WBC # BLD: 8.8 K/CU MM (ref 4–10.5)

## 2021-01-01 PROCEDURE — 84100 ASSAY OF PHOSPHORUS: CPT

## 2021-01-01 PROCEDURE — 80048 BASIC METABOLIC PNL TOTAL CA: CPT

## 2021-01-01 PROCEDURE — 6360000002 HC RX W HCPCS: Performed by: SURGERY

## 2021-01-01 PROCEDURE — 6360000002 HC RX W HCPCS

## 2021-01-01 PROCEDURE — 71045 X-RAY EXAM CHEST 1 VIEW: CPT

## 2021-01-01 PROCEDURE — P9045 ALBUMIN (HUMAN), 5%, 250 ML: HCPCS

## 2021-01-01 PROCEDURE — 99024 POSTOP FOLLOW-UP VISIT: CPT | Performed by: SURGERY

## 2021-01-01 PROCEDURE — 6370000000 HC RX 637 (ALT 250 FOR IP): Performed by: INTERNAL MEDICINE

## 2021-01-01 PROCEDURE — 6370000000 HC RX 637 (ALT 250 FOR IP): Performed by: SURGERY

## 2021-01-01 PROCEDURE — 1200000000 HC SEMI PRIVATE

## 2021-01-01 PROCEDURE — 7100000000 HC PACU RECOVERY - FIRST 15 MIN

## 2021-01-01 PROCEDURE — 36600 WITHDRAWAL OF ARTERIAL BLOOD: CPT

## 2021-01-01 PROCEDURE — 51702 INSERT TEMP BLADDER CATH: CPT

## 2021-01-01 PROCEDURE — 94640 AIRWAY INHALATION TREATMENT: CPT

## 2021-01-01 PROCEDURE — 93306 TTE W/DOPPLER COMPLETE: CPT

## 2021-01-01 PROCEDURE — 94761 N-INVAS EAR/PLS OXIMETRY MLT: CPT

## 2021-01-01 PROCEDURE — 76937 US GUIDE VASCULAR ACCESS: CPT | Performed by: SURGERY

## 2021-01-01 PROCEDURE — 82330 ASSAY OF CALCIUM: CPT

## 2021-01-01 PROCEDURE — 2580000003 HC RX 258: Performed by: INTERNAL MEDICINE

## 2021-01-01 PROCEDURE — 99223 1ST HOSP IP/OBS HIGH 75: CPT | Performed by: NURSE PRACTITIONER

## 2021-01-01 PROCEDURE — 83605 ASSAY OF LACTIC ACID: CPT

## 2021-01-01 PROCEDURE — 2500000003 HC RX 250 WO HCPCS

## 2021-01-01 PROCEDURE — 83735 ASSAY OF MAGNESIUM: CPT

## 2021-01-01 PROCEDURE — 93005 ELECTROCARDIOGRAM TRACING: CPT | Performed by: NURSE PRACTITIONER

## 2021-01-01 PROCEDURE — 6360000002 HC RX W HCPCS: Performed by: INTERNAL MEDICINE

## 2021-01-01 PROCEDURE — 89220 SPUTUM SPECIMEN COLLECTION: CPT

## 2021-01-01 PROCEDURE — 2580000003 HC RX 258: Performed by: SURGERY

## 2021-01-01 PROCEDURE — 70450 CT HEAD/BRAIN W/O DYE: CPT

## 2021-01-01 PROCEDURE — 5A1945Z RESPIRATORY VENTILATION, 24-96 CONSECUTIVE HOURS: ICD-10-PCS | Performed by: ANESTHESIOLOGY

## 2021-01-01 PROCEDURE — 36415 COLL VENOUS BLD VENIPUNCTURE: CPT

## 2021-01-01 PROCEDURE — 74018 RADEX ABDOMEN 1 VIEW: CPT

## 2021-01-01 PROCEDURE — 85730 THROMBOPLASTIN TIME PARTIAL: CPT

## 2021-01-01 PROCEDURE — 87040 BLOOD CULTURE FOR BACTERIA: CPT

## 2021-01-01 PROCEDURE — 82962 GLUCOSE BLOOD TEST: CPT

## 2021-01-01 PROCEDURE — 2500000003 HC RX 250 WO HCPCS: Performed by: NURSE PRACTITIONER

## 2021-01-01 PROCEDURE — 71275 CT ANGIOGRAPHY CHEST: CPT

## 2021-01-01 PROCEDURE — 97116 GAIT TRAINING THERAPY: CPT

## 2021-01-01 PROCEDURE — 80053 COMPREHEN METABOLIC PANEL: CPT

## 2021-01-01 PROCEDURE — 36592 COLLECT BLOOD FROM PICC: CPT

## 2021-01-01 PROCEDURE — 3600000003 HC SURGERY LEVEL 3 BASE: Performed by: SURGERY

## 2021-01-01 PROCEDURE — 31500 INSERT EMERGENCY AIRWAY: CPT

## 2021-01-01 PROCEDURE — 84484 ASSAY OF TROPONIN QUANT: CPT

## 2021-01-01 PROCEDURE — 2500000003 HC RX 250 WO HCPCS: Performed by: SURGERY

## 2021-01-01 PROCEDURE — 0BH17EZ INSERTION OF ENDOTRACHEAL AIRWAY INTO TRACHEA, VIA NATURAL OR ARTIFICIAL OPENING: ICD-10-PCS | Performed by: ANESTHESIOLOGY

## 2021-01-01 PROCEDURE — 74174 CTA ABD&PLVS W/CONTRAST: CPT

## 2021-01-01 PROCEDURE — 84295 ASSAY OF SERUM SODIUM: CPT

## 2021-01-01 PROCEDURE — 2709999900 HC NON-CHARGEABLE SUPPLY

## 2021-01-01 PROCEDURE — 36556 INSERT NON-TUNNEL CV CATH: CPT | Performed by: SURGERY

## 2021-01-01 PROCEDURE — C9113 INJ PANTOPRAZOLE SODIUM, VIA: HCPCS | Performed by: SURGERY

## 2021-01-01 PROCEDURE — 85027 COMPLETE CBC AUTOMATED: CPT

## 2021-01-01 PROCEDURE — 6360000002 HC RX W HCPCS: Performed by: STUDENT IN AN ORGANIZED HEALTH CARE EDUCATION/TRAINING PROGRAM

## 2021-01-01 PROCEDURE — 2580000003 HC RX 258: Performed by: NURSE PRACTITIONER

## 2021-01-01 PROCEDURE — 97110 THERAPEUTIC EXERCISES: CPT

## 2021-01-01 PROCEDURE — 6360000002 HC RX W HCPCS: Performed by: NURSE PRACTITIONER

## 2021-01-01 PROCEDURE — 88307 TISSUE EXAM BY PATHOLOGIST: CPT | Performed by: PATHOLOGY

## 2021-01-01 PROCEDURE — 85007 BL SMEAR W/DIFF WBC COUNT: CPT

## 2021-01-01 PROCEDURE — 2500000003 HC RX 250 WO HCPCS: Performed by: ANESTHESIOLOGY

## 2021-01-01 PROCEDURE — 95819 EEG AWAKE AND ASLEEP: CPT

## 2021-01-01 PROCEDURE — 2700000000 HC OXYGEN THERAPY PER DAY

## 2021-01-01 PROCEDURE — 90945 DIALYSIS ONE EVALUATION: CPT

## 2021-01-01 PROCEDURE — 86901 BLOOD TYPING SEROLOGIC RH(D): CPT

## 2021-01-01 PROCEDURE — 2500000003 HC RX 250 WO HCPCS: Performed by: INTERNAL MEDICINE

## 2021-01-01 PROCEDURE — 2500000003 HC RX 250 WO HCPCS: Performed by: STUDENT IN AN ORGANIZED HEALTH CARE EDUCATION/TRAINING PROGRAM

## 2021-01-01 PROCEDURE — 5A12012 PERFORMANCE OF CARDIAC OUTPUT, SINGLE, MANUAL: ICD-10-PCS | Performed by: SURGERY

## 2021-01-01 PROCEDURE — 6360000002 HC RX W HCPCS: Performed by: ANESTHESIOLOGY

## 2021-01-01 PROCEDURE — 93010 ELECTROCARDIOGRAM REPORT: CPT | Performed by: INTERNAL MEDICINE

## 2021-01-01 PROCEDURE — 87081 CULTURE SCREEN ONLY: CPT

## 2021-01-01 PROCEDURE — 36556 INSERT NON-TUNNEL CV CATH: CPT

## 2021-01-01 PROCEDURE — 82803 BLOOD GASES ANY COMBINATION: CPT

## 2021-01-01 PROCEDURE — 2580000003 HC RX 258

## 2021-01-01 PROCEDURE — 3600000013 HC SURGERY LEVEL 3 ADDTL 15MIN: Performed by: SURGERY

## 2021-01-01 PROCEDURE — 05HN33Z INSERTION OF INFUSION DEVICE INTO LEFT INTERNAL JUGULAR VEIN, PERCUTANEOUS APPROACH: ICD-10-PCS | Performed by: SURGERY

## 2021-01-01 PROCEDURE — C1894 INTRO/SHEATH, NON-LASER: HCPCS

## 2021-01-01 PROCEDURE — 86850 RBC ANTIBODY SCREEN: CPT

## 2021-01-01 PROCEDURE — 76937 US GUIDE VASCULAR ACCESS: CPT

## 2021-01-01 PROCEDURE — 6360000004 HC RX CONTRAST MEDICATION: Performed by: SURGERY

## 2021-01-01 PROCEDURE — 83036 HEMOGLOBIN GLYCOSYLATED A1C: CPT

## 2021-01-01 PROCEDURE — 83930 ASSAY OF BLOOD OSMOLALITY: CPT

## 2021-01-01 PROCEDURE — 2709999900 HC NON-CHARGEABLE SUPPLY: Performed by: SURGERY

## 2021-01-01 PROCEDURE — 37799 UNLISTED PX VASCULAR SURGERY: CPT

## 2021-01-01 PROCEDURE — C1752 CATH,HEMODIALYSIS,SHORT-TERM: HCPCS

## 2021-01-01 PROCEDURE — 2000000000 HC ICU R&B

## 2021-01-01 PROCEDURE — 0D1N0Z4 BYPASS SIGMOID COLON TO CUTANEOUS, OPEN APPROACH: ICD-10-PCS | Performed by: SURGERY

## 2021-01-01 PROCEDURE — 87076 CULTURE ANAEROBE IDENT EACH: CPT

## 2021-01-01 PROCEDURE — 99221 1ST HOSP IP/OBS SF/LOW 40: CPT | Performed by: SURGERY

## 2021-01-01 PROCEDURE — 85610 PROTHROMBIN TIME: CPT

## 2021-01-01 PROCEDURE — 86900 BLOOD TYPING SEROLOGIC ABO: CPT

## 2021-01-01 PROCEDURE — 3700000001 HC ADD 15 MINUTES (ANESTHESIA): Performed by: SURGERY

## 2021-01-01 PROCEDURE — 44141 PARTIAL REMOVAL OF COLON: CPT | Performed by: SURGERY

## 2021-01-01 PROCEDURE — 94003 VENT MGMT INPAT SUBQ DAY: CPT

## 2021-01-01 PROCEDURE — 94002 VENT MGMT INPAT INIT DAY: CPT

## 2021-01-01 PROCEDURE — 82140 ASSAY OF AMMONIA: CPT

## 2021-01-01 PROCEDURE — 97530 THERAPEUTIC ACTIVITIES: CPT

## 2021-01-01 PROCEDURE — C1894 INTRO/SHEATH, NON-LASER: HCPCS | Performed by: SURGERY

## 2021-01-01 PROCEDURE — 83880 ASSAY OF NATRIURETIC PEPTIDE: CPT

## 2021-01-01 PROCEDURE — 99999 PR OFFICE/OUTPT VISIT,PROCEDURE ONLY: CPT | Performed by: SURGERY

## 2021-01-01 PROCEDURE — 2580000003 HC RX 258: Performed by: ANESTHESIOLOGY

## 2021-01-01 PROCEDURE — 36620 INSERTION CATHETER ARTERY: CPT

## 2021-01-01 PROCEDURE — 31500 INSERT EMERGENCY AIRWAY: CPT | Performed by: ANESTHESIOLOGY

## 2021-01-01 PROCEDURE — 3700000000 HC ANESTHESIA ATTENDED CARE: Performed by: SURGERY

## 2021-01-01 RX ORDER — HYDROXYCHLOROQUINE SULFATE 200 MG/1
200 TABLET, FILM COATED ORAL 2 TIMES DAILY
Status: DISCONTINUED | OUTPATIENT
Start: 2021-01-08 | End: 2021-01-01 | Stop reason: HOSPADM

## 2021-01-01 RX ORDER — ALBUTEROL SULFATE 90 UG/1
4 AEROSOL, METERED RESPIRATORY (INHALATION) EVERY 4 HOURS
Status: DISCONTINUED | OUTPATIENT
Start: 2021-01-01 | End: 2021-01-01 | Stop reason: HOSPADM

## 2021-01-01 RX ORDER — MAGNESIUM SULFATE 1 G/100ML
1 INJECTION INTRAVENOUS PRN
Status: DISCONTINUED | OUTPATIENT
Start: 2021-01-01 | End: 2021-01-01 | Stop reason: HOSPADM

## 2021-01-01 RX ORDER — CALCIUM GLUCONATE 20 MG/ML
2 INJECTION, SOLUTION INTRAVENOUS PRN
Status: DISCONTINUED | OUTPATIENT
Start: 2021-01-01 | End: 2021-01-01 | Stop reason: HOSPADM

## 2021-01-01 RX ORDER — NOREPINEPHRINE BITARTRATE 1 MG/ML
INJECTION, SOLUTION INTRAVENOUS PRN
Status: DISCONTINUED | OUTPATIENT
Start: 2021-01-01 | End: 2021-01-01 | Stop reason: SDUPTHER

## 2021-01-01 RX ORDER — PROPOFOL 10 MG/ML
10 INJECTION, EMULSION INTRAVENOUS
Status: DISCONTINUED | OUTPATIENT
Start: 2021-01-01 | End: 2021-01-01

## 2021-01-01 RX ORDER — ONDANSETRON 2 MG/ML
4 INJECTION INTRAMUSCULAR; INTRAVENOUS EVERY 6 HOURS PRN
Status: DISCONTINUED | OUTPATIENT
Start: 2021-01-01 | End: 2021-01-01 | Stop reason: HOSPADM

## 2021-01-01 RX ORDER — CALCIUM GLUCONATE 20 MG/ML
1 INJECTION, SOLUTION INTRAVENOUS PRN
Status: DISCONTINUED | OUTPATIENT
Start: 2021-01-01 | End: 2021-01-01 | Stop reason: HOSPADM

## 2021-01-01 RX ORDER — CALCIUM GLUCONATE 20 MG/ML
1 INJECTION, SOLUTION INTRAVENOUS ONCE
Status: COMPLETED | OUTPATIENT
Start: 2021-01-01 | End: 2021-01-01

## 2021-01-01 RX ORDER — POLYETHYLENE GLYCOL 3350 17 G/17G
17 POWDER, FOR SOLUTION ORAL DAILY
Status: DISCONTINUED | OUTPATIENT
Start: 2021-01-01 | End: 2021-01-01 | Stop reason: HOSPADM

## 2021-01-01 RX ORDER — 0.9 % SODIUM CHLORIDE 0.9 %
2000 INTRAVENOUS SOLUTION INTRAVENOUS
Status: ACTIVE | OUTPATIENT
Start: 2021-01-01 | End: 2021-01-01

## 2021-01-01 RX ORDER — CHLORHEXIDINE GLUCONATE 0.12 MG/ML
15 RINSE ORAL 2 TIMES DAILY
Status: DISCONTINUED | OUTPATIENT
Start: 2021-01-01 | End: 2021-01-01

## 2021-01-01 RX ORDER — AMIODARONE HYDROCHLORIDE 50 MG/ML
INJECTION, SOLUTION INTRAVENOUS
Status: COMPLETED | OUTPATIENT
Start: 2021-01-01 | End: 2021-01-01

## 2021-01-01 RX ORDER — PROPOFOL 10 MG/ML
10 INJECTION, EMULSION INTRAVENOUS CONTINUOUS
Status: DISCONTINUED | OUTPATIENT
Start: 2021-01-01 | End: 2021-01-01 | Stop reason: HOSPADM

## 2021-01-01 RX ORDER — SODIUM CHLORIDE 9 MG/ML
INJECTION, SOLUTION INTRAVENOUS CONTINUOUS PRN
Status: COMPLETED | OUTPATIENT
Start: 2021-01-01 | End: 2021-01-01

## 2021-01-01 RX ORDER — CALCIUM CARBONATE 200(500)MG
500 TABLET,CHEWABLE ORAL 3 TIMES DAILY PRN
Status: DISCONTINUED | OUTPATIENT
Start: 2021-01-01 | End: 2021-01-01 | Stop reason: HOSPADM

## 2021-01-01 RX ORDER — MAGNESIUM SULFATE IN WATER 40 MG/ML
2 INJECTION, SOLUTION INTRAVENOUS ONCE
Status: COMPLETED | OUTPATIENT
Start: 2021-01-01 | End: 2021-01-01

## 2021-01-01 RX ORDER — DEXTROSE MONOHYDRATE 25 G/50ML
12.5 INJECTION, SOLUTION INTRAVENOUS PRN
Status: DISCONTINUED | OUTPATIENT
Start: 2021-01-01 | End: 2021-01-01 | Stop reason: HOSPADM

## 2021-01-01 RX ORDER — SODIUM CHLORIDE 9 MG/ML
INJECTION, SOLUTION INTRAVENOUS CONTINUOUS
Status: DISCONTINUED | OUTPATIENT
Start: 2021-01-01 | End: 2021-01-01

## 2021-01-01 RX ORDER — CHLORHEXIDINE GLUCONATE 0.12 MG/ML
15 RINSE ORAL 2 TIMES DAILY
Status: DISCONTINUED | OUTPATIENT
Start: 2021-01-01 | End: 2021-01-01 | Stop reason: HOSPADM

## 2021-01-01 RX ORDER — 0.9 % SODIUM CHLORIDE 0.9 %
1000 INTRAVENOUS SOLUTION INTRAVENOUS ONCE
Status: COMPLETED | OUTPATIENT
Start: 2021-01-01 | End: 2021-01-01

## 2021-01-01 RX ORDER — MAGNESIUM SULFATE IN WATER 40 MG/ML
INJECTION, SOLUTION INTRAVENOUS CONTINUOUS PRN
Status: COMPLETED | OUTPATIENT
Start: 2021-01-01 | End: 2021-01-01

## 2021-01-01 RX ORDER — SODIUM CHLORIDE 9 MG/ML
INJECTION, SOLUTION INTRAVENOUS CONTINUOUS PRN
Status: DISCONTINUED | OUTPATIENT
Start: 2021-01-01 | End: 2021-01-01 | Stop reason: SDUPTHER

## 2021-01-01 RX ORDER — ROCURONIUM BROMIDE 10 MG/ML
INJECTION, SOLUTION INTRAVENOUS PRN
Status: DISCONTINUED | OUTPATIENT
Start: 2021-01-01 | End: 2021-01-01 | Stop reason: SDUPTHER

## 2021-01-01 RX ORDER — POTASSIUM CHLORIDE 29.8 MG/ML
20 INJECTION INTRAVENOUS ONCE
Status: COMPLETED | OUTPATIENT
Start: 2021-01-01 | End: 2021-01-01

## 2021-01-01 RX ORDER — ALBUMIN, HUMAN INJ 5% 5 %
SOLUTION INTRAVENOUS
Status: COMPLETED
Start: 2021-01-01 | End: 2021-01-01

## 2021-01-01 RX ORDER — EPINEPHRINE 0.1 MG/ML
SYRINGE (ML) INJECTION
Status: COMPLETED | OUTPATIENT
Start: 2021-01-01 | End: 2021-01-01

## 2021-01-01 RX ORDER — LORAZEPAM 2 MG/ML
INJECTION INTRAMUSCULAR
Status: COMPLETED
Start: 2021-01-01 | End: 2021-01-01

## 2021-01-01 RX ORDER — MORPHINE SULFATE/0.9% NACL/PF 1 MG/ML
SYRINGE (ML) INJECTION CONTINUOUS
Status: DISCONTINUED | OUTPATIENT
Start: 2021-01-01 | End: 2021-01-01 | Stop reason: HOSPADM

## 2021-01-01 RX ORDER — SCOLOPAMINE TRANSDERMAL SYSTEM 1 MG/1
1 PATCH, EXTENDED RELEASE TRANSDERMAL
Status: DISCONTINUED | OUTPATIENT
Start: 2021-01-01 | End: 2021-01-01 | Stop reason: HOSPADM

## 2021-01-01 RX ORDER — GLYCOPYRROLATE 1 MG/5 ML
0.1 SYRINGE (ML) INTRAVENOUS EVERY 4 HOURS PRN
Status: DISCONTINUED | OUTPATIENT
Start: 2021-01-01 | End: 2021-01-01 | Stop reason: HOSPADM

## 2021-01-01 RX ORDER — DEXTROSE MONOHYDRATE 50 MG/ML
100 INJECTION, SOLUTION INTRAVENOUS PRN
Status: DISCONTINUED | OUTPATIENT
Start: 2021-01-01 | End: 2021-01-01 | Stop reason: HOSPADM

## 2021-01-01 RX ORDER — LORAZEPAM 2 MG/ML
2 INJECTION INTRAMUSCULAR
Status: DISCONTINUED | OUTPATIENT
Start: 2021-01-01 | End: 2021-01-01 | Stop reason: HOSPADM

## 2021-01-01 RX ORDER — ALBUMIN, HUMAN INJ 5% 5 %
25 SOLUTION INTRAVENOUS ONCE
Status: COMPLETED | OUTPATIENT
Start: 2021-01-01 | End: 2021-01-01

## 2021-01-01 RX ORDER — NALOXONE HYDROCHLORIDE 0.4 MG/ML
0.4 INJECTION, SOLUTION INTRAMUSCULAR; INTRAVENOUS; SUBCUTANEOUS PRN
Status: DISCONTINUED | OUTPATIENT
Start: 2021-01-01 | End: 2021-01-01 | Stop reason: HOSPADM

## 2021-01-01 RX ORDER — ATROPINE SULFATE 0.1 MG/ML
INJECTION INTRAVENOUS
Status: COMPLETED | OUTPATIENT
Start: 2021-01-01 | End: 2021-01-01

## 2021-01-01 RX ORDER — NICOTINE POLACRILEX 4 MG
15 LOZENGE BUCCAL PRN
Status: DISCONTINUED | OUTPATIENT
Start: 2021-01-01 | End: 2021-01-01 | Stop reason: HOSPADM

## 2021-01-01 RX ORDER — PROMETHAZINE HYDROCHLORIDE 25 MG/1
12.5 TABLET ORAL EVERY 6 HOURS PRN
Status: DISCONTINUED | OUTPATIENT
Start: 2021-01-01 | End: 2021-01-01 | Stop reason: HOSPADM

## 2021-01-01 RX ORDER — LORAZEPAM 2 MG/ML
2 INJECTION INTRAMUSCULAR ONCE
Status: COMPLETED | OUTPATIENT
Start: 2021-01-01 | End: 2021-01-01

## 2021-01-01 RX ORDER — POTASSIUM CHLORIDE 29.8 MG/ML
20 INJECTION INTRAVENOUS PRN
Status: DISCONTINUED | OUTPATIENT
Start: 2021-01-01 | End: 2021-01-01 | Stop reason: HOSPADM

## 2021-01-01 RX ORDER — CALCIUM CHLORIDE 100 MG/ML
INJECTION INTRAVENOUS; INTRAVENTRICULAR
Status: COMPLETED | OUTPATIENT
Start: 2021-01-01 | End: 2021-01-01

## 2021-01-01 RX ADMIN — SODIUM CHLORIDE: 9 INJECTION, SOLUTION INTRAVENOUS at 20:03

## 2021-01-01 RX ADMIN — SODIUM CHLORIDE: 9 INJECTION, SOLUTION INTRAVENOUS at 04:10

## 2021-01-01 RX ADMIN — ALBUTEROL SULFATE 4 PUFF: 90 AEROSOL, METERED RESPIRATORY (INHALATION) at 07:19

## 2021-01-01 RX ADMIN — ALBUMIN (HUMAN) 25 G: 12.5 INJECTION, SOLUTION INTRAVENOUS at 09:26

## 2021-01-01 RX ADMIN — Medication: at 14:06

## 2021-01-01 RX ADMIN — ATORVASTATIN CALCIUM 10 MG: 10 TABLET, FILM COATED ORAL at 20:29

## 2021-01-01 RX ADMIN — OMEGA-3-ACID ETHYL ESTERS 1000 MG: 1 CAPSULE, LIQUID FILLED ORAL at 09:21

## 2021-01-01 RX ADMIN — HYDROXYCHLOROQUINE SULFATE 200 MG: 200 TABLET, FILM COATED ORAL at 20:39

## 2021-01-01 RX ADMIN — Medication 1000 UNITS: at 20:29

## 2021-01-01 RX ADMIN — ALBUTEROL SULFATE 4 PUFF: 90 AEROSOL, METERED RESPIRATORY (INHALATION) at 21:35

## 2021-01-01 RX ADMIN — MORPHINE SULFATE 4 MG: 4 INJECTION, SOLUTION INTRAMUSCULAR; INTRAVENOUS at 23:06

## 2021-01-01 RX ADMIN — EPINEPHRINE 30 MCG/MIN: 1 INJECTION INTRAMUSCULAR; INTRAVENOUS; SUBCUTANEOUS at 10:40

## 2021-01-01 RX ADMIN — MORPHINE SULFATE 4 MG: 4 INJECTION, SOLUTION INTRAMUSCULAR; INTRAVENOUS at 17:41

## 2021-01-01 RX ADMIN — EPINEPHRINE 30 MCG/MIN: 1 INJECTION INTRAMUSCULAR; INTRAVENOUS; SUBCUTANEOUS at 22:57

## 2021-01-01 RX ADMIN — EPINEPHRINE 30 MCG/MIN: 1 INJECTION INTRAMUSCULAR; INTRAVENOUS; SUBCUTANEOUS at 19:45

## 2021-01-01 RX ADMIN — ALBUTEROL SULFATE 4 PUFF: 90 AEROSOL, METERED RESPIRATORY (INHALATION) at 17:00

## 2021-01-01 RX ADMIN — OXYCODONE HYDROCHLORIDE AND ACETAMINOPHEN 1 TABLET: 5; 325 TABLET ORAL at 04:35

## 2021-01-01 RX ADMIN — LORAZEPAM 2 MG: 2 INJECTION INTRAMUSCULAR at 06:50

## 2021-01-01 RX ADMIN — MORPHINE SULFATE 4 MG: 4 INJECTION, SOLUTION INTRAMUSCULAR; INTRAVENOUS at 20:28

## 2021-01-01 RX ADMIN — EPINEPHRINE 1 MG: 0.1 INJECTION, SOLUTION ENDOTRACHEAL; INTRACARDIAC; INTRAVENOUS at 20:48

## 2021-01-01 RX ADMIN — MORPHINE SULFATE 4 MG: 4 INJECTION, SOLUTION INTRAMUSCULAR; INTRAVENOUS at 04:09

## 2021-01-01 RX ADMIN — LISINOPRIL AND HYDROCHLOROTHIAZIDE 1 TABLET: 12.5; 2 TABLET ORAL at 09:20

## 2021-01-01 RX ADMIN — NOREPINEPHRINE BITARTRATE 20 MCG: 1 INJECTION INTRAVENOUS at 01:50

## 2021-01-01 RX ADMIN — OXYCODONE HYDROCHLORIDE AND ACETAMINOPHEN 1 TABLET: 5; 325 TABLET ORAL at 06:29

## 2021-01-01 RX ADMIN — MORPHINE SULFATE 4 MG: 4 INJECTION, SOLUTION INTRAMUSCULAR; INTRAVENOUS at 02:59

## 2021-01-01 RX ADMIN — IPRATROPIUM BROMIDE 4 PUFF: 17 AEROSOL, METERED RESPIRATORY (INHALATION) at 07:20

## 2021-01-01 RX ADMIN — MORPHINE SULFATE 4 MG: 4 INJECTION, SOLUTION INTRAMUSCULAR; INTRAVENOUS at 14:47

## 2021-01-01 RX ADMIN — IOPAMIDOL 75 ML: 755 INJECTION, SOLUTION INTRAVENOUS at 23:51

## 2021-01-01 RX ADMIN — PIPERACILLIN AND TAZOBACTAM 3.38 G: 3; .375 INJECTION, POWDER, FOR SOLUTION INTRAVENOUS at 18:32

## 2021-01-01 RX ADMIN — SODIUM CHLORIDE: 900 INJECTION INTRAVENOUS at 01:36

## 2021-01-01 RX ADMIN — ROCURONIUM BROMIDE 50 MG: 10 INJECTION INTRAVENOUS at 01:32

## 2021-01-01 RX ADMIN — MORPHINE SULFATE 4 MG: 4 INJECTION, SOLUTION INTRAMUSCULAR; INTRAVENOUS at 07:49

## 2021-01-01 RX ADMIN — MAGNESIUM SULFATE HEPTAHYDRATE 2 G: 40 INJECTION, SOLUTION INTRAVENOUS at 09:12

## 2021-01-01 RX ADMIN — ENOXAPARIN SODIUM 40 MG: 40 INJECTION SUBCUTANEOUS at 09:22

## 2021-01-01 RX ADMIN — CALCIUM GLUCONATE 1 G: 20 INJECTION, SOLUTION INTRAVENOUS at 11:34

## 2021-01-01 RX ADMIN — Medication: at 00:19

## 2021-01-01 RX ADMIN — OXYCODONE HYDROCHLORIDE AND ACETAMINOPHEN 1 TABLET: 5; 325 TABLET ORAL at 13:20

## 2021-01-01 RX ADMIN — ASPIRIN 81 MG CHEWABLE TABLET 81 MG: 81 TABLET CHEWABLE at 19:50

## 2021-01-01 RX ADMIN — Medication 1200 ML/HR: at 20:40

## 2021-01-01 RX ADMIN — Medication 50 MEQ: at 20:44

## 2021-01-01 RX ADMIN — VASOPRESSIN 0.04 UNITS: 20 INJECTION INTRAVENOUS at 01:06

## 2021-01-01 RX ADMIN — VASOPRESSIN 0.04 UNITS/MIN: 20 INJECTION INTRAVENOUS at 00:15

## 2021-01-01 RX ADMIN — SODIUM BICARBONATE 50 MEQ: 84 INJECTION, SOLUTION INTRAVENOUS at 22:00

## 2021-01-01 RX ADMIN — Medication 1000 UNITS: at 20:03

## 2021-01-01 RX ADMIN — Medication 1000 UNITS: at 12:51

## 2021-01-01 RX ADMIN — NOREPINEPHRINE BITARTRATE 20 MCG: 1 INJECTION INTRAVENOUS at 01:53

## 2021-01-01 RX ADMIN — EPINEPHRINE 30 MCG/MIN: 1 INJECTION INTRAMUSCULAR; INTRAVENOUS; SUBCUTANEOUS at 13:44

## 2021-01-01 RX ADMIN — MORPHINE SULFATE 30 MG: 1 INJECTION INTRAVENOUS at 06:17

## 2021-01-01 RX ADMIN — AMIODARONE HYDROCHLORIDE 150 MG: 50 INJECTION, SOLUTION INTRAVENOUS at 20:45

## 2021-01-01 RX ADMIN — PANTOPRAZOLE SODIUM 40 MG: 40 INJECTION, POWDER, FOR SOLUTION INTRAVENOUS at 07:49

## 2021-01-01 RX ADMIN — VASOPRESSIN 0.04 UNITS/MIN: 20 INJECTION INTRAVENOUS at 15:49

## 2021-01-01 RX ADMIN — OYSTER SHELL CALCIUM WITH VITAMIN D 2 TABLET: 500; 200 TABLET, FILM COATED ORAL at 09:21

## 2021-01-01 RX ADMIN — DOCUSATE SODIUM 100 MG: 100 CAPSULE, LIQUID FILLED ORAL at 19:50

## 2021-01-01 RX ADMIN — MORPHINE SULFATE 4 MG: 4 INJECTION, SOLUTION INTRAMUSCULAR; INTRAVENOUS at 12:53

## 2021-01-01 RX ADMIN — SODIUM CHLORIDE, PRESERVATIVE FREE 10 ML: 5 INJECTION INTRAVENOUS at 07:49

## 2021-01-01 RX ADMIN — SODIUM CHLORIDE 1000 ML: 9 INJECTION, SOLUTION INTRAVENOUS at 06:15

## 2021-01-01 RX ADMIN — POTASSIUM CHLORIDE 40 MEQ: 1500 TABLET, EXTENDED RELEASE ORAL at 17:41

## 2021-01-01 RX ADMIN — EPINEPHRINE 30 MCG/MIN: 1 INJECTION, SOLUTION, CONCENTRATE INTRAVENOUS at 01:38

## 2021-01-01 RX ADMIN — FAMOTIDINE 20 MG: 10 INJECTION, SOLUTION INTRAVENOUS at 20:57

## 2021-01-01 RX ADMIN — Medication 1000 UNITS: at 19:50

## 2021-01-01 RX ADMIN — VASOPRESSIN 0.04 UNITS/MIN: 20 INJECTION INTRAVENOUS at 07:11

## 2021-01-01 RX ADMIN — MORPHINE SULFATE 4 MG: 4 INJECTION, SOLUTION INTRAMUSCULAR; INTRAVENOUS at 09:12

## 2021-01-01 RX ADMIN — SODIUM BICARBONATE: 84 INJECTION, SOLUTION INTRAVENOUS at 22:50

## 2021-01-01 RX ADMIN — EPINEPHRINE 30 MCG: 1 INJECTION, SOLUTION, CONCENTRATE INTRAVENOUS at 01:06

## 2021-01-01 RX ADMIN — DOCUSATE SODIUM 100 MG: 100 CAPSULE, LIQUID FILLED ORAL at 07:50

## 2021-01-01 RX ADMIN — MORPHINE SULFATE 4 MG: 4 INJECTION, SOLUTION INTRAMUSCULAR; INTRAVENOUS at 15:46

## 2021-01-01 RX ADMIN — Medication 5 MCG/MIN: at 21:37

## 2021-01-01 RX ADMIN — SODIUM CHLORIDE 1000 ML: 9 INJECTION, SOLUTION INTRAVENOUS at 08:33

## 2021-01-01 RX ADMIN — DOCUSATE SODIUM 100 MG: 100 CAPSULE, LIQUID FILLED ORAL at 20:02

## 2021-01-01 RX ADMIN — AMIODARONE HYDROCHLORIDE 0.5 MG/MIN: 50 INJECTION, SOLUTION INTRAVENOUS at 16:00

## 2021-01-01 RX ADMIN — NOREPINEPHRINE BITARTRATE 20 MCG: 1 INJECTION INTRAVENOUS at 02:16

## 2021-01-01 RX ADMIN — MAGNESIUM SULFATE 2 G: 2 INJECTION INTRAVENOUS at 20:47

## 2021-01-01 RX ADMIN — ONDANSETRON 4 MG: 2 INJECTION INTRAMUSCULAR; INTRAVENOUS at 09:16

## 2021-01-01 RX ADMIN — EPINEPHRINE 6 MCG/MIN: 1 INJECTION INTRAMUSCULAR; INTRAVENOUS; SUBCUTANEOUS at 21:30

## 2021-01-01 RX ADMIN — HYDROMORPHONE HYDROCHLORIDE 0.5 MG: 1 INJECTION, SOLUTION INTRAMUSCULAR; INTRAVENOUS; SUBCUTANEOUS at 05:25

## 2021-01-01 RX ADMIN — SODIUM CHLORIDE, PRESERVATIVE FREE 10 ML: 5 INJECTION INTRAVENOUS at 00:27

## 2021-01-01 RX ADMIN — SODIUM CHLORIDE, PRESERVATIVE FREE 10 ML: 5 INJECTION INTRAVENOUS at 09:38

## 2021-01-01 RX ADMIN — SODIUM CHLORIDE, PRESERVATIVE FREE 10 ML: 5 INJECTION INTRAVENOUS at 13:13

## 2021-01-01 RX ADMIN — MORPHINE SULFATE 4 MG: 4 INJECTION, SOLUTION INTRAMUSCULAR; INTRAVENOUS at 08:23

## 2021-01-01 RX ADMIN — SODIUM BICARBONATE 100 MEQ: 84 INJECTION, SOLUTION INTRAVENOUS at 09:50

## 2021-01-01 RX ADMIN — CALCIUM GLUCONATE 2 G: 20 INJECTION, SOLUTION INTRAVENOUS at 22:45

## 2021-01-01 RX ADMIN — PANTOPRAZOLE SODIUM 40 MG: 40 INJECTION, POWDER, FOR SOLUTION INTRAVENOUS at 09:12

## 2021-01-01 RX ADMIN — ASPIRIN 81 MG CHEWABLE TABLET 81 MG: 81 TABLET CHEWABLE at 07:50

## 2021-01-01 RX ADMIN — NOREPINEPHRINE BITARTRATE 20 MCG: 1 INJECTION INTRAVENOUS at 02:04

## 2021-01-01 RX ADMIN — CALCIUM CARBONATE 500 MG: 500 TABLET, CHEWABLE ORAL at 16:15

## 2021-01-01 RX ADMIN — MINOCYCLINE HYDROCHLORIDE 50 MG: 50 CAPSULE ORAL at 20:28

## 2021-01-01 RX ADMIN — HYDROXYCHLOROQUINE SULFATE 200 MG: 200 TABLET, FILM COATED ORAL at 10:23

## 2021-01-01 RX ADMIN — Medication 50 MEQ: at 22:00

## 2021-01-01 RX ADMIN — OXYCODONE HYDROCHLORIDE AND ACETAMINOPHEN 1 TABLET: 5; 325 TABLET ORAL at 12:05

## 2021-01-01 RX ADMIN — OMEGA-3-ACID ETHYL ESTERS 1000 MG: 1 CAPSULE, LIQUID FILLED ORAL at 07:50

## 2021-01-01 RX ADMIN — EPINEPHRINE 30 MCG/MIN: 1 INJECTION INTRAMUSCULAR; INTRAVENOUS; SUBCUTANEOUS at 04:32

## 2021-01-01 RX ADMIN — CALCIUM CHLORIDE 1 G: 100 INJECTION, SOLUTION INTRAVENOUS; INTRAVENTRICULAR at 20:49

## 2021-01-01 RX ADMIN — MORPHINE SULFATE 4 MG: 4 INJECTION, SOLUTION INTRAMUSCULAR; INTRAVENOUS at 15:42

## 2021-01-01 RX ADMIN — PHENYLEPHRINE HYDROCHLORIDE 100 MCG/MIN: 10 INJECTION INTRAVENOUS at 06:08

## 2021-01-01 RX ADMIN — Medication 200 ML/HR: at 14:07

## 2021-01-01 RX ADMIN — EPINEPHRINE 30 MCG/MIN: 1 INJECTION INTRAMUSCULAR; INTRAVENOUS; SUBCUTANEOUS at 05:22

## 2021-01-01 RX ADMIN — ALBUTEROL SULFATE 4 PUFF: 90 AEROSOL, METERED RESPIRATORY (INHALATION) at 01:15

## 2021-01-01 RX ADMIN — AMIODARONE HYDROCHLORIDE 150 MG: 50 INJECTION, SOLUTION INTRAVENOUS at 10:15

## 2021-01-01 RX ADMIN — LISINOPRIL AND HYDROCHLOROTHIAZIDE 1 TABLET: 12.5; 2 TABLET ORAL at 07:50

## 2021-01-01 RX ADMIN — ASPIRIN 81 MG CHEWABLE TABLET 81 MG: 81 TABLET CHEWABLE at 09:21

## 2021-01-01 RX ADMIN — HYDROXYCHLOROQUINE SULFATE 200 MG: 200 TABLET, FILM COATED ORAL at 09:21

## 2021-01-01 RX ADMIN — MAGNESIUM SULFATE HEPTAHYDRATE 2 G: 40 INJECTION, SOLUTION INTRAVENOUS at 19:09

## 2021-01-01 RX ADMIN — SODIUM CHLORIDE: 9 INJECTION, SOLUTION INTRAVENOUS at 12:52

## 2021-01-01 RX ADMIN — OXYCODONE HYDROCHLORIDE AND ACETAMINOPHEN 1 TABLET: 5; 325 TABLET ORAL at 13:25

## 2021-01-01 RX ADMIN — IPRATROPIUM BROMIDE 4 PUFF: 17 AEROSOL, METERED RESPIRATORY (INHALATION) at 16:59

## 2021-01-01 RX ADMIN — Medication 30 MCG/MIN: at 16:30

## 2021-01-01 RX ADMIN — POTASSIUM CHLORIDE 10 MEQ: 7.46 INJECTION, SOLUTION INTRAVENOUS at 13:21

## 2021-01-01 RX ADMIN — MORPHINE SULFATE 4 MG: 4 INJECTION, SOLUTION INTRAMUSCULAR; INTRAVENOUS at 00:15

## 2021-01-01 RX ADMIN — SODIUM CHLORIDE, PRESERVATIVE FREE 10 ML: 5 INJECTION INTRAVENOUS at 07:51

## 2021-01-01 RX ADMIN — NOREPINEPHRINE BITARTRATE 30 MCG: 1 INJECTION INTRAVENOUS at 01:58

## 2021-01-01 RX ADMIN — SODIUM CHLORIDE, PRESERVATIVE FREE 10 ML: 5 INJECTION INTRAVENOUS at 20:59

## 2021-01-01 RX ADMIN — EPINEPHRINE 15 MCG/MIN: 1 INJECTION INTRAMUSCULAR; INTRAVENOUS; SUBCUTANEOUS at 22:00

## 2021-01-01 RX ADMIN — IPRATROPIUM BROMIDE 4 PUFF: 17 AEROSOL, METERED RESPIRATORY (INHALATION) at 01:15

## 2021-01-01 RX ADMIN — PANTOPRAZOLE SODIUM 40 MG: 40 INJECTION, POWDER, FOR SOLUTION INTRAVENOUS at 13:11

## 2021-01-01 RX ADMIN — Medication 1200 ML/HR: at 14:06

## 2021-01-01 RX ADMIN — EPINEPHRINE 1 MG: 0.1 INJECTION, SOLUTION ENDOTRACHEAL; INTRACARDIAC; INTRAVENOUS at 20:43

## 2021-01-01 RX ADMIN — OYSTER SHELL CALCIUM WITH VITAMIN D 2 TABLET: 500; 200 TABLET, FILM COATED ORAL at 10:23

## 2021-01-01 RX ADMIN — SODIUM CHLORIDE: 9 INJECTION, SOLUTION INTRAVENOUS at 11:34

## 2021-01-01 RX ADMIN — IPRATROPIUM BROMIDE 4 PUFF: 17 AEROSOL, METERED RESPIRATORY (INHALATION) at 21:35

## 2021-01-01 RX ADMIN — ATROPINE SULFATE 1 MG: 0.1 INJECTION PARENTERAL at 05:44

## 2021-01-01 RX ADMIN — MORPHINE SULFATE 4 MG: 4 INJECTION, SOLUTION INTRAMUSCULAR; INTRAVENOUS at 11:34

## 2021-01-01 RX ADMIN — SODIUM BICARBONATE: 84 INJECTION, SOLUTION INTRAVENOUS at 08:30

## 2021-01-01 RX ADMIN — MORPHINE SULFATE 4 MG: 4 INJECTION, SOLUTION INTRAMUSCULAR; INTRAVENOUS at 03:06

## 2021-01-01 RX ADMIN — PIPERACILLIN AND TAZOBACTAM 3.38 G: 3; .375 INJECTION, POWDER, FOR SOLUTION INTRAVENOUS at 22:29

## 2021-01-01 RX ADMIN — PHENYLEPHRINE HYDROCHLORIDE 300 MCG/MIN: 10 INJECTION INTRAVENOUS at 00:01

## 2021-01-01 RX ADMIN — OYSTER SHELL CALCIUM WITH VITAMIN D 2 TABLET: 500; 200 TABLET, FILM COATED ORAL at 13:12

## 2021-01-01 RX ADMIN — AMIODARONE HYDROCHLORIDE 1 MG/MIN: 50 INJECTION, SOLUTION INTRAVENOUS at 10:33

## 2021-01-01 RX ADMIN — HYDROXYCHLOROQUINE SULFATE 200 MG: 200 TABLET, FILM COATED ORAL at 20:28

## 2021-01-01 RX ADMIN — OXYCODONE HYDROCHLORIDE AND ACETAMINOPHEN 1 TABLET: 5; 325 TABLET ORAL at 20:51

## 2021-01-01 RX ADMIN — ASPIRIN 81 MG CHEWABLE TABLET 81 MG: 81 TABLET CHEWABLE at 20:03

## 2021-01-01 RX ADMIN — NOREPINEPHRINE BITARTRATE 30 MCG: 1 INJECTION INTRAVENOUS at 02:10

## 2021-01-01 RX ADMIN — MORPHINE SULFATE 4 MG: 4 INJECTION, SOLUTION INTRAMUSCULAR; INTRAVENOUS at 11:32

## 2021-01-01 RX ADMIN — MINOCYCLINE HYDROCHLORIDE 50 MG: 50 CAPSULE ORAL at 19:55

## 2021-01-01 RX ADMIN — EPINEPHRINE 30 MCG/MIN: 1 INJECTION INTRAMUSCULAR; INTRAVENOUS; SUBCUTANEOUS at 01:59

## 2021-01-01 RX ADMIN — HYDROXYCHLOROQUINE SULFATE 200 MG: 200 TABLET, FILM COATED ORAL at 13:12

## 2021-01-01 RX ADMIN — Medication 1000 UNITS: at 07:50

## 2021-01-01 RX ADMIN — POLYETHYLENE GLYCOL (3350) 17 G: 17 POWDER, FOR SOLUTION ORAL at 09:21

## 2021-01-01 RX ADMIN — SODIUM CHLORIDE: 9 INJECTION, SOLUTION INTRAVENOUS at 13:15

## 2021-01-01 RX ADMIN — IPRATROPIUM BROMIDE 4 PUFF: 17 AEROSOL, METERED RESPIRATORY (INHALATION) at 11:18

## 2021-01-01 RX ADMIN — ENOXAPARIN SODIUM 40 MG: 40 INJECTION SUBCUTANEOUS at 13:11

## 2021-01-01 RX ADMIN — CALCIUM GLUCONATE 1 G: 20 INJECTION, SOLUTION INTRAVENOUS at 04:15

## 2021-01-01 RX ADMIN — OMEGA-3-ACID ETHYL ESTERS 1000 MG: 1 CAPSULE, LIQUID FILLED ORAL at 13:11

## 2021-01-01 RX ADMIN — ATORVASTATIN CALCIUM 10 MG: 10 TABLET, FILM COATED ORAL at 20:03

## 2021-01-01 RX ADMIN — SODIUM BICARBONATE 50 MEQ: 84 INJECTION, SOLUTION INTRAVENOUS at 09:58

## 2021-01-01 RX ADMIN — LORAZEPAM 2 MG: 2 INJECTION INTRAMUSCULAR; INTRAVENOUS at 06:50

## 2021-01-01 RX ADMIN — SODIUM CHLORIDE: 9 INJECTION, SOLUTION INTRAVENOUS at 21:50

## 2021-01-01 RX ADMIN — OXYCODONE HYDROCHLORIDE AND ACETAMINOPHEN 1 TABLET: 5; 325 TABLET ORAL at 01:00

## 2021-01-01 RX ADMIN — ENOXAPARIN SODIUM 40 MG: 40 INJECTION SUBCUTANEOUS at 07:49

## 2021-01-01 RX ADMIN — EPINEPHRINE 30 MCG/MIN: 1 INJECTION INTRAMUSCULAR; INTRAVENOUS; SUBCUTANEOUS at 17:33

## 2021-01-01 RX ADMIN — ATORVASTATIN CALCIUM 10 MG: 10 TABLET, FILM COATED ORAL at 19:50

## 2021-01-01 RX ADMIN — Medication 1000 UNITS: at 13:09

## 2021-01-01 RX ADMIN — PHENYLEPHRINE HYDROCHLORIDE 300 MCG/MIN: 10 INJECTION INTRAVENOUS at 06:27

## 2021-01-01 RX ADMIN — POTASSIUM CHLORIDE 20 MEQ: 29.8 INJECTION, SOLUTION INTRAVENOUS at 09:28

## 2021-01-01 RX ADMIN — DOCUSATE SODIUM 100 MG: 100 CAPSULE, LIQUID FILLED ORAL at 20:29

## 2021-01-01 RX ADMIN — OXYCODONE HYDROCHLORIDE AND ACETAMINOPHEN 1 TABLET: 5; 325 TABLET ORAL at 17:54

## 2021-01-01 RX ADMIN — ASPIRIN 81 MG CHEWABLE TABLET 81 MG: 81 TABLET CHEWABLE at 13:11

## 2021-01-01 RX ADMIN — VANCOMYCIN HYDROCHLORIDE 1250 MG: 5 INJECTION, POWDER, LYOPHILIZED, FOR SOLUTION INTRAVENOUS at 20:53

## 2021-01-01 RX ADMIN — LISINOPRIL AND HYDROCHLOROTHIAZIDE 1 TABLET: 12.5; 2 TABLET ORAL at 13:10

## 2021-01-01 RX ADMIN — SODIUM BICARBONATE: 84 INJECTION, SOLUTION INTRAVENOUS at 17:33

## 2021-01-01 RX ADMIN — MORPHINE SULFATE 4 MG: 4 INJECTION, SOLUTION INTRAMUSCULAR; INTRAVENOUS at 19:49

## 2021-01-01 RX ADMIN — PHENYLEPHRINE HYDROCHLORIDE 150 MCG/MIN: 10 INJECTION INTRAVENOUS at 12:04

## 2021-01-01 RX ADMIN — POTASSIUM CHLORIDE 10 MEQ: 7.46 INJECTION, SOLUTION INTRAVENOUS at 11:36

## 2021-01-01 RX ADMIN — ASPIRIN 81 MG CHEWABLE TABLET 81 MG: 81 TABLET CHEWABLE at 20:30

## 2021-01-01 RX ADMIN — NOREPINEPHRINE BITARTRATE 30 MCG: 1 INJECTION INTRAVENOUS at 01:47

## 2021-01-01 RX ADMIN — ACETAMINOPHEN 650 MG: 650 SUPPOSITORY RECTAL at 09:13

## 2021-01-01 RX ADMIN — ALBUTEROL SULFATE 4 PUFF: 90 AEROSOL, METERED RESPIRATORY (INHALATION) at 11:19

## 2021-01-01 RX ADMIN — DOCUSATE SODIUM 100 MG: 100 CAPSULE, LIQUID FILLED ORAL at 13:10

## 2021-01-01 RX ADMIN — MINOCYCLINE HYDROCHLORIDE 50 MG: 50 CAPSULE ORAL at 20:03

## 2021-01-01 RX ADMIN — CHLORHEXIDINE GLUCONATE 0.12% ORAL RINSE 15 ML: 1.2 LIQUID ORAL at 20:56

## 2021-01-01 RX ADMIN — ALBUMIN, HUMAN INJ 5% 25 G: 5 SOLUTION at 09:26

## 2021-01-01 RX ADMIN — SODIUM BICARBONATE: 84 INJECTION, SOLUTION INTRAVENOUS at 00:12

## 2021-01-01 RX ADMIN — DOCUSATE SODIUM 100 MG: 100 CAPSULE, LIQUID FILLED ORAL at 09:21

## 2021-01-01 RX ADMIN — HYDROXYCHLOROQUINE SULFATE 200 MG: 200 TABLET, FILM COATED ORAL at 19:55

## 2021-01-01 RX ADMIN — SODIUM CHLORIDE 1000 ML/HR: 9 INJECTION, SOLUTION INTRAVENOUS at 20:50

## 2021-01-01 RX ADMIN — VANCOMYCIN HYDROCHLORIDE 1500 MG: 5 INJECTION, POWDER, LYOPHILIZED, FOR SOLUTION INTRAVENOUS at 22:58

## 2021-01-01 RX ADMIN — IOPAMIDOL 75 ML: 755 INJECTION, SOLUTION INTRAVENOUS at 23:54

## 2021-01-01 RX ADMIN — PIPERACILLIN AND TAZOBACTAM 3.38 G: 3; .375 INJECTION, POWDER, FOR SOLUTION INTRAVENOUS at 05:09

## 2021-01-01 RX ADMIN — SODIUM CHLORIDE: 9 INJECTION, SOLUTION INTRAVENOUS at 02:59

## 2021-01-01 RX ADMIN — POLYETHYLENE GLYCOL (3350) 17 G: 17 POWDER, FOR SOLUTION ORAL at 16:49

## 2021-01-01 RX ADMIN — PHENYLEPHRINE HYDROCHLORIDE 300 MCG/MIN: 10 INJECTION INTRAVENOUS at 20:52

## 2021-01-01 RX ADMIN — AMIODARONE HYDROCHLORIDE 150 MG: 50 INJECTION, SOLUTION INTRAVENOUS at 20:46

## 2021-01-01 RX ADMIN — MORPHINE SULFATE 4 MG: 4 INJECTION, SOLUTION INTRAMUSCULAR; INTRAVENOUS at 20:02

## 2021-01-01 RX ADMIN — EPINEPHRINE 30 MCG/MIN: 1 INJECTION INTRAMUSCULAR; INTRAVENOUS; SUBCUTANEOUS at 07:39

## 2021-01-01 RX ADMIN — OXYCODONE HYDROCHLORIDE AND ACETAMINOPHEN 1 TABLET: 5; 325 TABLET ORAL at 16:03

## 2021-01-01 ASSESSMENT — PAIN SCALES - GENERAL
PAINLEVEL_OUTOF10: 10
PAINLEVEL_OUTOF10: 7
PAINLEVEL_OUTOF10: 9
PAINLEVEL_OUTOF10: 7
PAINLEVEL_OUTOF10: 10
PAINLEVEL_OUTOF10: 5
PAINLEVEL_OUTOF10: 8
PAINLEVEL_OUTOF10: 8
PAINLEVEL_OUTOF10: 7
PAINLEVEL_OUTOF10: 5
PAINLEVEL_OUTOF10: 5
PAINLEVEL_OUTOF10: 0
PAINLEVEL_OUTOF10: 7
PAINLEVEL_OUTOF10: 9
PAINLEVEL_OUTOF10: 7
PAINLEVEL_OUTOF10: 7
PAINLEVEL_OUTOF10: 9

## 2021-01-01 ASSESSMENT — PULMONARY FUNCTION TESTS
PIF_VALUE: 23
PIF_VALUE: 24
PIF_VALUE: 25
PIF_VALUE: 23
PIF_VALUE: 23
PIF_VALUE: 22
PIF_VALUE: 23
PIF_VALUE: 27
PIF_VALUE: 22
PIF_VALUE: 23
PIF_VALUE: 23
PIF_VALUE: 24
PIF_VALUE: 23
PIF_VALUE: 22
PIF_VALUE: 27
PIF_VALUE: 24
PIF_VALUE: 24
PIF_VALUE: 23
PIF_VALUE: 20
PIF_VALUE: 22
PIF_VALUE: 23
PIF_VALUE: 24
PIF_VALUE: 21
PIF_VALUE: 23
PIF_VALUE: 21
PIF_VALUE: 23
PIF_VALUE: 25
PIF_VALUE: 23
PIF_VALUE: 23
PIF_VALUE: 22
PIF_VALUE: 26
PIF_VALUE: 25
PIF_VALUE: 21
PIF_VALUE: 24
PIF_VALUE: 23
PIF_VALUE: 11
PIF_VALUE: 25
PIF_VALUE: 23
PIF_VALUE: 24
PIF_VALUE: 23
PIF_VALUE: 25
PIF_VALUE: 23
PIF_VALUE: 20
PIF_VALUE: 25

## 2021-01-01 ASSESSMENT — PAIN - FUNCTIONAL ASSESSMENT: PAIN_FUNCTIONAL_ASSESSMENT: ACTIVITIES ARE NOT PREVENTED

## 2021-01-01 ASSESSMENT — PAIN DESCRIPTION - PROGRESSION
CLINICAL_PROGRESSION: GRADUALLY WORSENING
CLINICAL_PROGRESSION: GRADUALLY IMPROVING
CLINICAL_PROGRESSION: GRADUALLY WORSENING
CLINICAL_PROGRESSION: GRADUALLY IMPROVING
CLINICAL_PROGRESSION: GRADUALLY WORSENING
CLINICAL_PROGRESSION: GRADUALLY WORSENING
CLINICAL_PROGRESSION: GRADUALLY IMPROVING
CLINICAL_PROGRESSION: GRADUALLY IMPROVING

## 2021-01-01 ASSESSMENT — PAIN DESCRIPTION - LOCATION
LOCATION: ABDOMEN

## 2021-01-01 ASSESSMENT — PAIN DESCRIPTION - FREQUENCY
FREQUENCY: CONTINUOUS
FREQUENCY: CONTINUOUS

## 2021-01-01 ASSESSMENT — PAIN DESCRIPTION - PAIN TYPE
TYPE: SURGICAL PAIN

## 2021-01-01 ASSESSMENT — PAIN DESCRIPTION - ONSET: ONSET: GRADUAL

## 2021-01-01 ASSESSMENT — PAIN DESCRIPTION - ORIENTATION: ORIENTATION: MID;LOWER

## 2021-01-01 NOTE — PROGRESS NOTES
Patient complaining of nausea this morning. Morning meds held per patient request. Patient resting now. Mag replacement is running then calcium and potassium will be replaced. Will continue to monitor.

## 2021-01-01 NOTE — PROGRESS NOTES
GENERAL SURGERY PROGRESS NOTE    Moncho Sow is a 77 y.o. female with lower abdominal pain and leukocytosis. S/p ex lap with sigmoid resection and primary anastomosis               Subjective:  Doing ok this morning. Pain controlled now that she got her pain medication, but had some pain this morning before her meds were given. She has had some nausea, but not vomiting. No BM or flatus yet.      Objective:    Vitals: VITALS:  BP (!) 167/77   Pulse 94   Temp 98.4 °F (36.9 °C) (Oral)   Resp 17   Ht 4' 11\" (1.499 m)   Wt 164 lb (74.4 kg)   SpO2 91%   BMI 33.12 kg/m²     I/O: 12/31 0701 - 01/01 0700  In: -   Out: 600 [Urine:600]    Labs/Imaging Results:   Lab Results   Component Value Date     01/01/2021    K 3.0 01/01/2021     01/01/2021    CO2 22 01/01/2021    BUN 10 01/01/2021    CREATININE 0.5 01/01/2021    GLUCOSE 125 01/01/2021    CALCIUM 6.5 01/01/2021      Lab Results   Component Value Date    WBC 8.5 01/01/2021    HGB 10.1 (L) 01/01/2021    HCT 30.8 (L) 01/01/2021    MCV 97.2 01/01/2021     01/01/2021       IV Fluids: sodium chloride Last Rate: 100 mL/hr at 01/01/21 0259    Scheduled Meds:   magnesium sulfate, 2 g, Intravenous, Once    calcium gluconate, 1 g, Intravenous, Once    docusate sodium, 100 mg, Oral, BID    influenza virus vaccine, 0.5 mL, Intramuscular, Prior to discharge    aspirin, 81 mg, Oral, BID    sodium chloride flush, 10 mL, Intravenous, BID    atorvastatin, 10 mg, Oral, Nightly    calcium-vitamin D, 2 tablet, Oral, Daily    hydroxychloroquine, 200 mg, Oral, BID    lisinopril-hydroCHLOROthiazide, 1 tablet, Oral, Daily    methotrexate, 10 mg, Oral, Weekly    minocycline, 50 mg, Oral, Nightly    omega-3 acid ethyl esters, 1,000 mg, Oral, Daily    vitamin D, 1,000 Units, Oral, BID    sodium chloride flush, 10 mL, Intravenous, 2 times per day    enoxaparin, 40 mg, Subcutaneous, Daily    pantoprazole, 40 mg, Intravenous, Daily    Autologous Serum 20% OPH SOLN (eye drops, patient own supply), 1 drop, Both Eyes, 4x Daily    Physical Exam:  General: A&O x 3, no distress. HEENT: Anicteric sclerae, MMM. Extremities: No edema bilat LE. Abdomen: Soft, incision dressing intact, staples intact, appropriately tender      Assessment and Plan:  77 y.o. female with abdominal pain an leukocytosis. 2 Days Post-Op  from ex lap with sigmoid resection and primary anastomosis.     Patient Active Problem List:     Primary osteoarthritis of left knee     Genu valgum, acquired     Essential hypertension     Sjogren's disease ([facility] [address]     Vitamin D deficiency     Environmental allergies     Mixed hyperlipidemia     Osteoporosis     Abdominal pain      - NG removed yesterday  - clear liquids and toast OK  - continue symptomatic management of pain and nausea  - increase ambulation  - await return of bowel function    Hall Mohs, MD

## 2021-01-01 NOTE — PROGRESS NOTES
Hospitalist Progress Note         Admit Date: 12/29/2020    PCP: Hattie Fields PA-C     Chief Complaint   Patient presents with    Abdominal Pain    Emesis        Assessment and Plan:     -Abdominal pain/nausea and vomiting with no evidence of sepsis  S/p exploratory laparotomy and sigmoid resection. Pain control, IVF. Tolerating clears.  -Sjogren's syndrome we will restart methotrexate and Plaquenil when appropriate.  -Leukocytosis possibly from stress related. Monitor CBC while off antibiotics. -HTN continue lisinopril/HCTZ. Monitor vitals  -Hypokalemia/hypomagnesemia/hypocalcemia on replacement protocol.       VTE prophylaxis LMWH  Tolerating clear liquids and advance diet as tolerated    Current Facility-Administered Medications   Medication Dose Route Frequency Provider Last Rate Last Admin    magnesium sulfate 2 g in 50 mL IVPB premix  2 g Intravenous Once Becky pSear MD 25 mL/hr at 01/01/21 0912 2 g at 01/01/21 0912    calcium gluconate 1 g in sodium chloride 50 mL  1 g Intravenous Once Becky Spear MD        docusate sodium (COLACE) capsule 100 mg  100 mg Oral BID Jerilyn Maynard MD   100 mg at 12/31/20 2056    influenza quadrivalent split vaccine (FLUZONE;FLUARIX;FLULAVAL;AFLURIA) injection 0.5 mL  0.5 mL Intramuscular Prior to discharge Becky Spear MD        sodium chloride (OCEAN, BABY AYR) 0.65 % nasal spray 1 spray  1 spray Each Nostril PRN Jerilyn Maynard MD        aspirin chewable tablet 81 mg  81 mg Oral BID Jerilyn Maynard MD   81 mg at 12/31/20 2056    oxyCODONE-acetaminophen (PERCOCET) 5-325 MG per tablet 1 tablet  1 tablet Oral Q4H PRN Jerilyn Maynard MD   1 tablet at 12/31/20 1519    morphine sulfate (PF) injection 4 mg  4 mg Intravenous Q2H PRN Jerilyn Maynard MD   4 mg at 01/01/21 0912    sodium chloride flush 0.9 % injection 10 mL  10 mL Intravenous BID Jerilyn Maynard MD   Stopped at 12/31/20 2247    ondansetron (ZOFRAN) injection 4 mg  4 mg Intravenous Q30 Min PRN El Dee MD   4 mg at 12/29/20 2890    atorvastatin (LIPITOR) tablet 10 mg  10 mg Oral Nightly El Dee MD   10 mg at 12/31/20 2056    calcium-vitamin D (OSCAL-500) 500-200 MG-UNIT per tablet 2 tablet  2 tablet Oral Daily El Dee MD   2 tablet at 12/31/20 1207    hydroxychloroquine (PLAQUENIL) tablet 200 mg  200 mg Oral BID El Dee MD   Stopped at 12/31/20 2247    lisinopril-hydroCHLOROthiazide (PRINZIDE;ZESTORETIC) 20-12.5 MG per tablet 1 tablet  1 tablet Oral Daily El Dee MD   1 tablet at 12/31/20 0935    methotrexate (RHEUMATREX) chemo tablet 10 mg  10 mg Oral Weekly El Dee MD        minocycline (MINOCIN;DYNACIN) capsule 50 mg  50 mg Oral Nightly El Dee MD   Stopped at 12/31/20 2247    omega-3 acid ethyl esters (LOVAZA) capsule 1,000 mg  1,000 mg Oral Daily El Dee MD   1,000 mg at 12/31/20 0935    vitamin D CAPS 1,000 Units  1,000 Units Oral BID El Dee MD   1,000 Units at 12/31/20 2056    0.9 % sodium chloride infusion   Intravenous Continuous El Dee  mL/hr at 01/01/21 0259 New Bag at 01/01/21 0259    sodium chloride flush 0.9 % injection 10 mL  10 mL Intravenous 2 times per day El Dee MD   Stopped at 12/31/20 2247    sodium chloride flush 0.9 % injection 10 mL  10 mL Intravenous PRN El Dee MD        enoxaparin (LOVENOX) injection 40 mg  40 mg Subcutaneous Daily El Dee MD   40 mg at 12/31/20 0935    promethazine (PHENERGAN) tablet 12.5 mg  12.5 mg Oral Q6H PRN El Dee MD        Or    ondansetron [facility]) injection 4 mg  4 mg Intravenous Q6H PRN El Dee MD   4 mg at 01/01/21 0916    polyethylene glycol (GLYCOLAX) packet 17 g  17 g Oral Daily PRN El Dee MD        acetaminophen (TYLENOL) tablet 650 mg  650 mg Oral Q6H PRN El Dee MD        Or    acetaminophen (TYLENOL) suppository 650 mg  650 mg Rectal Q6H PRN Jerilyn Maynard MD        potassium chloride (KLOR-CON M) extended release tablet 40 mEq  40 mEq Oral PRN Jerilyn Maynard MD        Or    potassium bicarb-citric acid (EFFER-K) effervescent tablet 40 mEq  40 mEq Oral PRN Jerilyn Maynard MD        Or    potassium chloride 10 mEq/100 mL IVPB (Peripheral Line)  10 mEq Intravenous PRN Jerilyn Maynard MD        pantoprazole (PROTONIX) injection 40 mg  40 mg Intravenous Daily Jerilyn Maynard MD   40 mg at 01/01/21 0912    Autologous Serum 20% OPH SOLN (eye drops, patient own supply)  1 drop Both Eyes 4x Daily Jerilyn Maynard MD   1 drop at 12/31/20 2131       Subjective:     S/p exploratory laparotomy postop day 2  NG tube was removed. No acute events since last night    Objective: Intake/Output Summary (Last 24 hours) at 1/1/2021 0921  Last data filed at 12/31/2020 2300  Gross per 24 hour   Intake --   Output 600 ml   Net -600 ml      Vitals:   Vitals:    01/01/21 0751   BP: (!) 167/77   Pulse: 94   Resp: 17   Temp: 98.4 °F (36.9 °C)   SpO2: 91%     Physical Exam:  General Appearance: Moderate distress due to pain +  Head:      Normocephalic, without obvious abnormality, atraumatic  Eyes:       Conjunctiva/corneas clear, EOM's intact  Lungs:    Clear to auscultation bilaterally, respirations unlabored  Heart:                Regular rate and rhythm, S1 and S2 normal, no murmur,   rub or gallop  Abdomen:     Soft, postop changes with incisional tenderness around, BS +  Extremities:   Extremities normal, atraumatic, no cyanosis or edema  Neurological:   Grossly Intact.     Significant Diagnostic Studies:   DATA:    CBC   Recent Labs     12/29/20 1957 12/30/20  0644 01/01/21  0604   WBC 15.1* 13.8* 8.5   HGB 12.3* 11.7* 10.1*   HCT 36.7* 35.5* 30.8*    178 175      BMP   Recent Labs     12/29/20 1957 12/30/20  0644 01/01/21  0604    140 138   K 3.4* 3.4* 3.0*    104 109   CO2 25 24 22   BUN 14 13 10   CREATININE 0.7 0.7 0.5*     LFT'S   No results for input(s): AST, ALT, ALB, BILIDIR, BILITOT, ALKPHOS in the last 72 hours. COAG No results for input(s): INR in the last 72 hours. POC: No results found for: POCGLU  [id number]:No results found for: LABA1C  CARDIAC ENZYMES  No results for input(s): CKTOTAL, CKMB, CKMBINDEX, TROPONINI in the last 72 hours. Troponin:   No results for input(s): TROPONINT in the last 72 hours. BNP:   No results for input(s): PROBNP in the last 72 hours. U/A:    Lab Results   Component Value Date    COLORU YELLOW 12/29/2020    WBCUA 1 12/29/2020    RBCUA 3 12/29/2020    MUCUS NEGATIVE 08/01/2018    BACTERIA NEGATIVE 12/29/2020    CLARITYU CLEAR 12/29/2020    SPECGRAV 1.034 12/29/2020    LEUKOCYTESUR NEGATIVE 12/29/2020    BLOODU SMALL 12/29/2020       Ct Abdomen Pelvis W Iv Contrast Additional Contrast? None    Result Date: 12/29/2020  EXAMINATION: CT OF THE ABDOMEN AND PELVIS WITH CONTRAST 12/29/2020 4:04 am TECHNIQUE: CT of the abdomen and pelvis was performed with the administration of intravenous contrast. Multiplanar reformatted images are provided for review. Dose modulation, iterative reconstruction, and/or weight based adjustment of the mA/kV was utilized to reduce the radiation dose to as low as reasonably achievable. COMPARISON: None. HISTORY: ORDERING SYSTEM PROVIDED HISTORY: abd pain TECHNOLOGIST PROVIDED HISTORY: Reason for exam:->abd pain Additional Contrast?->None Reason for Exam: mid low abd pain FINDINGS: Lower Chest: The lung bases are well aerated. Pleural surfaces are unremarkable and no evidence of pleural effusion is identified. Heart is mildly enlarged with otherwise unremarkable configuration. Organs: Diffuse fatty infiltration of the liver is noted. The liver, gallbladder, spleen, pancreas, adrenal glands, kidneys, are otherwise unremarkable in appearance. GI/Bowel:  The stomach is unremarkable without wall thickening and large bowel. No abnormally dilated gas-filled loops of bowel. No pathologic calcification. Severe lumbar levoscoliosis. Mild-to-moderate degenerative changes of the sacroiliac joints. 1. No evidence of free air or small bowel obstruction. 2. Gas throughout the small and large bowel may reflect ileus.            [facility] Center[facility]

## 2021-01-02 NOTE — PROGRESS NOTES
Physical Therapy    Physical Therapy Treatment Note  Name: Calista Mckenzie MRN: [id number] :   1945   Date:  2021   Admission Date: 2020 Room:  [address] [place] [address]   Restrictions/Precautions:        fall risk, abd surgery  Communication with other providers:  Thania Crump states pt is ok to see for therapy  Subjective:  Patient states:  She would like to walk and get her hair washed  Pain:   Location, Type, Intensity (0/10 to 10/10):  3/10 abd  Objective:    Observation:  Pt was resting in the bed  Treatment, including education/measures:  Transfers  Supine to sit :CGA to min A  Scooting :SBA and VC's  Rolling :min A and VC's  Sit to stand :VC's for hand placements and SBA from bed and toilet  Stand to sit :VC's for hand placements and SBA to toilet and chair  Attempted to get pt a bath cap to wash her hair but the clean utility room did not have a supply  Gait:  Pt amb with RW for 200 ft with CGA and A for the IV  Pt needed VC's for pathway, pt has slow maryana and forward flexed posture  Safety  Patient left safely in the chair, with call light/phone in reach. Gait belt and mask were used for transfers and gait.   Assessment / Impression:     Patient's tolerance of treatment:  Good, pt is very motivated   Adverse Reaction: none  Significant change in status and impact:  none  Barriers to improvement:  Pain, posture  Plan for Next Session:    Will cont to work towards pt's goals per her tolerance  Time in:  1100  Time out:  1138  Timed treatment minutes:  38  Total treatment time:  45  Previously filed items:  Social/Functional History  Lives With: Spouse(in good health and available )  Type of Home: House  Home Layout: One level  Home Access: Stairs to enter with rails  Entrance Stairs - Number of Steps: 14 (from basement level garage)  Entrance Stairs - Rails: Right  Bathroom Shower/Tub: Tub/Shower unit  Bathroom Toilet: Handicap height  Bathroom Equipment: Grab bars in shower  Bathroom Accessibility: Accessible  Home Equipment: Rolling walker, 4 wheeled walker  ADL Assistance: [id number]: [address] [facility] Responsibilities: Yes  Ambulation Assistance: Independent(uses no AD, briefly used 4ww after TKA)  Transfer Assistance: Independent  Active : Yes  Occupation: Retired  Short term goals  Time Frame for Holdenerika Makenzie term goals: 1 week  Short term goal 1: Pt will perform sit><supine SBA  Short term goal 2: Pt will transfer to all surfaces SBA  Short term goal 3: Pt will ambulate 200ft with LRAD SBA  Short term goal 4: Pt will ascend/descend 1 flight of stairs, single rail SBA   Electronically signed by:     Jessica Ruffin PTA  1/2/2021, 11:31 AM

## 2021-01-02 NOTE — PROGRESS NOTES
MMM.  Extremities: No edema bilat LE. Abdomen: Soft, incision dressing intact, staples intact, appropriately tender      Assessment and Plan:  77 y.o. female with abdominal pain an leukocytosis. 3 Days Post-Op  from ex lap with sigmoid resection and primary anastomosis.     Patient Active Problem List:     Primary osteoarthritis of left knee     Genu valgum, acquired     Essential hypertension     Sjogren's disease ([address]     Vitamin D deficiency     Environmental allergies     Mixed hyperlipidemia     Osteoporosis     Abdominal pain      -Tolerated clear liquids and toast OK, will advance to full liquids today  - continue symptomatic management of pain and nausea  - increase ambulation  - await return of bowel function    Abran Gutierrez MD

## 2021-01-02 NOTE — PROGRESS NOTES
Hospitalist Progress Note         Admit Date: 12/29/2020    PCP: Yemi mR PA-C     Chief Complaint   Patient presents with    Abdominal Pain    Emesis        Assessment and Plan:     -Abdominal pain/nausea and vomiting with no evidence of sepsis  S/p exploratory laparotomy and sigmoid resection. Pain control, IVF. Tolerating clears.  -Sjogren's syndrome we will restart methotrexate and Plaquenil when appropriate.  -Leukocytosis possibly from stress related. Monitor CBC while off antibiotics. -HTN continue lisinopril/HCTZ. Monitor vitals  -Hypokalemia/hypomagnesemia/hypocalcemia on replacement protocol.       VTE prophylaxis LMWH  Tolerating clear liquids and advance diet as tolerated    Current Facility-Administered Medications   Medication Dose Route Frequency Provider Last Rate Last Admin    docusate sodium (COLACE) capsule 100 mg  100 mg Oral BID Michael Basurto MD   100 mg at 01/01/21 1950    influenza quadrivalent split vaccine (FLUZONE;FLUARIX;FLULAVAL;AFLURIA) injection 0.5 mL  0.5 mL Intramuscular Prior to discharge Charlotte Martinez MD        sodium chloride (OCEAN, BABY AYR) 0.65 % nasal spray 1 spray  1 spray Each Nostril PRN Michael Basurto MD        aspirin chewable tablet 81 mg  81 mg Oral BID Michael Basurto MD   81 mg at 01/01/21 1950    oxyCODONE-acetaminophen (PERCOCET) 5-325 MG per tablet 1 tablet  1 tablet Oral Q4H PRN Michael Basurto MD   1 tablet at 01/02/21 0435    morphine sulfate (PF) injection 4 mg  4 mg Intravenous Q2H PRN Michael Basurto MD   4 mg at 01/02/21 1253    sodium chloride flush 0.9 % injection 10 mL  10 mL Intravenous BID Michael Basurto MD   Stopped at 12/31/20 2247    ondansetron (ZOFRAN) injection 4 mg  4 mg Intravenous Q30 Min PRN Michael Basurto MD   4 mg at 12/29/20 0804    atorvastatin (LIPITOR) tablet 10 mg  10 mg Oral Nightly Michael Basurto MD   10 mg at 01/01/21 1950    calcium-vitamin D (Toy Dave) 500-200 MG-UNIT per tablet 2 tablet  2 tablet Oral Daily Abhay Benitez MD   Stopped at 01/01/21 0932    hydroxychloroquine (PLAQUENIL) tablet 200 mg  200 mg Oral BID Abhay Benitez MD   200 mg at 01/01/21 1955    lisinopril-hydroCHLOROthiazide (PRINZIDE;ZESTORETIC) 20-12.5 MG per tablet 1 tablet  1 tablet Oral Daily Abhay Benitez MD   Stopped at 01/01/21 0933    methotrexate (RHEUMATREX) chemo tablet 10 mg  10 mg Oral Weekly Abhay Benitez MD        minocycline (MINOCIN;DYNACIN) capsule 50 mg  50 mg Oral Nightly Abhay Benitez MD   50 mg at 01/01/21 1955    omega-3 acid ethyl esters (LOVAZA) capsule 1,000 mg  1,000 mg Oral Daily Abhay Benitez MD   Stopped at 01/01/21 0933    vitamin D CAPS 1,000 Units  1,000 Units Oral BID Abhay Benitez MD   1,000 Units at 01/02/21 1309    0.9 % sodium chloride infusion   Intravenous Continuous Abhay Benitez  mL/hr at 01/01/21 1134 New Bag at 01/01/21 1134    sodium chloride flush 0.9 % injection 10 mL  10 mL Intravenous 2 times per day Abhay Benitez MD   Stopped at 12/31/20 2247    sodium chloride flush 0.9 % injection 10 mL  10 mL Intravenous PRN Abhay Benitez MD        enoxaparin (LOVENOX) injection 40 mg  40 mg Subcutaneous Daily Abhay Benitez MD   Stopped at 01/01/21 0933    promethazine (PHENERGAN) tablet 12.5 mg  12.5 mg Oral Q6H PRN Abhay Benitez MD        Or    ondansetron [facility]) injection 4 mg  4 mg Intravenous Q6H PRN Abhay Benitez MD   4 mg at 01/01/21 0916    polyethylene glycol (GLYCOLAX) packet 17 g  17 g Oral Daily PRN Abhay Benitez MD        acetaminophen (TYLENOL) tablet 650 mg  650 mg Oral Q6H PRN Abhay Benitez MD        Or    acetaminophen (TYLENOL) suppository 650 mg  650 mg Rectal Q6H PRN Abhay Benitez MD        potassium chloride (KLOR-CON M) extended release tablet 40 mEq  40 mEq Oral PRN Abhay Benitez MD   40 mEq at 01/01/21 1741    Or    potassium bicarb-citric acid (EFFER-K) effervescent tablet 40 mEq  40 mEq Oral PRN Lorraine Mendoza MD        Or    potassium chloride 10 mEq/100 mL IVPB (Peripheral Line)  10 mEq Intravenous PRN Lorraine Mendoza  mL/hr at 01/01/21 1321 10 mEq at 01/01/21 1321    pantoprazole (PROTONIX) injection 40 mg  40 mg Intravenous Daily Lorraine Mendoza MD   40 mg at 01/01/21 0912    Autologous Serum 20% OPH SOLN (eye drops, patient own supply)  1 drop Both Eyes 4x Daily Lorraine Mendoza MD   1 drop at 01/01/21 1955       Subjective:     S/p exploratory laparotomy postop day 3  No acute events since last night    Objective:     No intake or output data in the 24 hours ending 01/02/21 1311   Vitals:   Vitals:    01/02/21 1028   BP: 129/60   Pulse: 86   Resp: 18   Temp: 97.5 °F (36.4 °C)   SpO2: 94%     Physical Exam:  General Appearance:  Mild distress due to pain +  Head:      Normocephalic, without obvious abnormality, atraumatic  Eyes:       Conjunctiva/corneas clear, EOM's intact  Lungs:    Clear to auscultation bilaterally, respirations unlabored  Heart:                Regular rate and rhythm, S1 and S2 normal, no murmur,   rub or gallop  Abdomen:     Soft, postop changes with incisional tenderness around, BS +  Extremities:   Extremities normal, atraumatic, no cyanosis or edema  Neurological:   Grossly Intact. Significant Diagnostic Studies:   DATA:    CBC   Recent Labs     01/01/21  0604   WBC 8.5   HGB 10.1*   HCT 30.8*         BMP   Recent Labs     01/01/21  0604      K 3.0*      CO2 22   BUN 10   CREATININE 0.5*     LFT'S   No results for input(s): AST, ALT, ALB, BILIDIR, BILITOT, ALKPHOS in the last 72 hours. COAG No results for input(s): INR in the last 72 hours. POC: No results found for: POCGLU  [id number]:No results found for: LABA1C  CARDIAC ENZYMES  No results for input(s): CKTOTAL, CKMB, CKMBINDEX, TROPONINI in the last 72 hours.   Troponin:   No results for input(s): TROPONINT in the last 72 hours. BNP:   No results for input(s): PROBNP in the last 72 hours. U/A:    Lab Results   Component Value Date    COLORU YELLOW 12/29/2020    WBCUA 1 12/29/2020    RBCUA 3 12/29/2020    MUCUS NEGATIVE 08/01/2018    BACTERIA NEGATIVE 12/29/2020    CLARITYU CLEAR 12/29/2020    SPECGRAV 1.034 12/29/2020    LEUKOCYTESUR NEGATIVE 12/29/2020    BLOODU SMALL 12/29/2020       Ct Abdomen Pelvis W Iv Contrast Additional Contrast? None    Result Date: 12/29/2020  EXAMINATION: CT OF THE ABDOMEN AND PELVIS WITH CONTRAST 12/29/2020 4:04 am TECHNIQUE: CT of the abdomen and pelvis was performed with the administration of intravenous contrast. Multiplanar reformatted images are provided for review. Dose modulation, iterative reconstruction, and/or weight based adjustment of the mA/kV was utilized to reduce the radiation dose to as low as reasonably achievable. COMPARISON: None. HISTORY: ORDERING SYSTEM PROVIDED HISTORY: abd pain TECHNOLOGIST PROVIDED HISTORY: Reason for exam:->abd pain Additional Contrast?->None Reason for Exam: mid low abd pain FINDINGS: Lower Chest: The lung bases are well aerated. Pleural surfaces are unremarkable and no evidence of pleural effusion is identified. Heart is mildly enlarged with otherwise unremarkable configuration. Organs: Diffuse fatty infiltration of the liver is noted. The liver, gallbladder, spleen, pancreas, adrenal glands, kidneys, are otherwise unremarkable in appearance. GI/Bowel: The stomach is unremarkable without wall thickening or distention. Scattered diverticula are seen throughout the colon without definitive evidence of adjacent inflammatory changes within the mesenteric fat. Bowel loops are unremarkable in appearance without evidence of obstruction, distension or mucosal thickening. Pelvis: The urinary bladder is well distended and unremarkable in appearance. No evidence of pelvic free fluid is seen.  Peritoneum/Retroperitoneum: Scattered mild intraperitoneal free fluid is noted. No evidence of intraperitoneal or retroperitoneal lymphadenopathy is identified. Bones/Soft Tissues: The bones, skeletal muscle bundles, fascial planes and subcutaneous soft tissues are unremarkable in appearance. 1. Colonic diverticulosis without definitive evidence of diverticulitis. 2. Mild cardiomegaly. 3. Hepatic steatosis. 4. Mild scattered intraperitoneal free fluid. Xr Chest Portable    Result Date: 12/29/2020  EXAMINATION: ONE XRAY VIEW OF THE CHEST 12/29/2020 2:17 am COMPARISON: Chest radiograph performed December 31, 2016. HISTORY: ORDERING SYSTEM PROVIDED HISTORY: abd pain, vomiting TECHNOLOGIST PROVIDED HISTORY: Reason for exam:->abd pain, vomiting Reason for Exam: abd pain, vomiting Acuity: Acute Type of Exam: Initial FINDINGS: The lungs are clear without focal consolidation, pleural effusion, or pneumothorax. Stable cardiomediastinal silhouette. S-shaped curvature of the visualized thoracolumbar spine. No acute cardiopulmonary findings. Xr Abdomen (2 Views)    Result Date: 12/29/2020  EXAMINATION: TWO XRAY VIEWS OF THE ABDOMEN 12/29/2020 8:59 pm COMPARISON: None. HISTORY: ORDERING SYSTEM PROVIDED HISTORY: abdominal pain, assess gas pattern, rule out free air TECHNOLOGIST PROVIDED HISTORY: Reason for exam:->abdominal pain, assess gas pattern, rule out free air Reason for Exam: abdominal pain, assess gas pattern, rule out free air Acuity: Acute Type of Exam: Initial FINDINGS: No subdeltoid free air. Gas throughout the small and large bowel. No abnormally dilated gas-filled loops of bowel. No pathologic calcification. Severe lumbar levoscoliosis. Mild-to-moderate degenerative changes of the sacroiliac joints. 1. No evidence of free air or small bowel obstruction. 2. Gas throughout the small and large bowel may reflect ileus.            Kyara [facility] Healthcare Systemist

## 2021-01-03 NOTE — PROGRESS NOTES
Hospitalist Progress Note         Admit Date: 12/29/2020    PCP: Shakir Guajardo PA-C     Chief Complaint   Patient presents with    Abdominal Pain    Emesis        Assessment and Plan:     -Abdominal pain/nausea and vomiting with no evidence of sepsis  S/p exploratory laparotomy and sigmoid resection. Pain control, IVF. Tolerating clears.  -Sjogren's syndrome we will restart methotrexate and Plaquenil when appropriate.  -Leukocytosis possibly from stress related. Monitor CBC while off antibiotics. -HTN continue lisinopril/HCTZ. Monitor vitals  -Hypokalemia/hypomagnesemia/hypocalcemia on replacement protocol.       VTE prophylaxis LMWH  Tolerating clear liquids and advance diet as tolerated    Current Facility-Administered Medications   Medication Dose Route Frequency Provider Last Rate Last Admin    calcium carbonate (TUMS) chewable tablet 500 mg  500 mg Oral TID PRN Gordon Grossman MD        docusate sodium (COLACE) capsule 100 mg  100 mg Oral BID Lyssa Jordan MD   100 mg at 01/03/21 0750    influenza quadrivalent split vaccine (FLUZONE;FLUARIX;FLULAVAL;AFLURIA) injection 0.5 mL  0.5 mL Intramuscular Prior to discharge Gordon Grossman MD        sodium chloride (OCEAN, BABY AYR) 0.65 % nasal spray 1 spray  1 spray Each Nostril PRN Lyssa Jordan MD        aspirin chewable tablet 81 mg  81 mg Oral BID Lyssa Jordan MD   81 mg at 01/03/21 0750    oxyCODONE-acetaminophen (PERCOCET) 5-325 MG per tablet 1 tablet  1 tablet Oral Q4H PRN Lyssa Jordan MD   1 tablet at 01/02/21 1754    morphine sulfate (PF) injection 4 mg  4 mg Intravenous Q2H PRN Lyssa Jordan MD   4 mg at 01/03/21 1132    sodium chloride flush 0.9 % injection 10 mL  10 mL Intravenous BID Lyssa Jordan MD   10 mL at 01/03/21 0751    ondansetron (ZOFRAN) injection 4 mg  4 mg Intravenous Q30 Min PRN Lyssa Jordan MD   4 mg at 12/29/20 0804    atorvastatin (LIPITOR) tablet 10 mg  10 mg Oral Nightly Johnny Kitchen MD   10 mg at 01/02/21 2003    calcium-vitamin D (OSCAL-500) 500-200 MG-UNIT per tablet 2 tablet  2 tablet Oral Daily Johnny Kitchen MD   2 tablet at 01/03/21 1023    hydroxychloroquine (PLAQUENIL) tablet 200 mg  200 mg Oral BID Johnny Kitchen MD   200 mg at 01/03/21 1023    lisinopril-hydroCHLOROthiazide (PRINZIDE;ZESTORETIC) 20-12.5 MG per tablet 1 tablet  1 tablet Oral Daily Johnny Kitchen MD   1 tablet at 01/03/21 0750    methotrexate (RHEUMATREX) chemo tablet 10 mg  10 mg Oral Weekly Johnny Kitchen MD        minocycline (MINOCIN;DYNACIN) capsule 50 mg  50 mg Oral Nightly Johnny Kitchen MD   50 mg at 01/02/21 2003    omega-3 acid ethyl esters (LOVAZA) capsule 1,000 mg  1,000 mg Oral Daily Johnny Kitchen MD   1,000 mg at 01/03/21 0750    vitamin D CAPS 1,000 Units  1,000 Units Oral BID Johnny Kitchen MD   1,000 Units at 01/03/21 0750    0.9 % sodium chloride infusion   Intravenous Continuous Johnny Kitchen  mL/hr at 01/03/21 0410 New Bag at 01/03/21 0410    sodium chloride flush 0.9 % injection 10 mL  10 mL Intravenous 2 times per day Johnny Kitchen MD   10 mL at 01/03/21 0749    sodium chloride flush 0.9 % injection 10 mL  10 mL Intravenous PRN Johnny Kitchen MD        enoxaparin (LOVENOX) injection 40 mg  40 mg Subcutaneous Daily Johnny Kitchen MD   40 mg at 01/03/21 0749    promethazine (PHENERGAN) tablet 12.5 mg  12.5 mg Oral Q6H PRN Johnny Kitchen MD        Or    [facility]ron [facility]) injection 4 mg  4 mg Intravenous Q6H PRN Johnny Kitchen MD   4 mg at 01/01/21 0916    polyethylene glycol (GLYCOLAX) packet 17 g  17 g Oral Daily PRN Johnny Kitchen MD        acetaminophen (TYLENOL) tablet 650 mg  650 mg Oral Q6H PRN Johnny Kitchen MD        Or   Radha Guallpa acetaminophen (TYLENOL) suppository 650 mg  650 mg Rectal Q6H PRN Johnny Kitchen MD        potassium chloride Mariano GOMEZ) extended release tablet No results found for: POCGLU  [id number]:No results found for: LABA1C  CARDIAC ENZYMES  No results for input(s): CKTOTAL, CKMB, CKMBINDEX, TROPONINI in the last 72 hours. Troponin:   No results for input(s): TROPONINT in the last 72 hours. BNP:   No results for input(s): PROBNP in the last 72 hours. U/A:    Lab Results   Component Value Date    COLORU YELLOW 12/29/2020    WBCUA 1 12/29/2020    RBCUA 3 12/29/2020    MUCUS NEGATIVE 08/01/2018    BACTERIA NEGATIVE 12/29/2020    CLARITYU CLEAR 12/29/2020    SPECGRAV 1.034 12/29/2020    LEUKOCYTESUR NEGATIVE 12/29/2020    BLOODU SMALL 12/29/2020       Ct Abdomen Pelvis W Iv Contrast Additional Contrast? None    Result Date: 12/29/2020  EXAMINATION: CT OF THE ABDOMEN AND PELVIS WITH CONTRAST 12/29/2020 4:04 am TECHNIQUE: CT of the abdomen and pelvis was performed with the administration of intravenous contrast. Multiplanar reformatted images are provided for review. Dose modulation, iterative reconstruction, and/or weight based adjustment of the mA/kV was utilized to reduce the radiation dose to as low as reasonably achievable. COMPARISON: None. HISTORY: ORDERING SYSTEM PROVIDED HISTORY: abd pain TECHNOLOGIST PROVIDED HISTORY: Reason for exam:->abd pain Additional Contrast?->None Reason for Exam: mid low abd pain FINDINGS: Lower Chest: The lung bases are well aerated. Pleural surfaces are unremarkable and no evidence of pleural effusion is identified. Heart is mildly enlarged with otherwise unremarkable configuration. Organs: Diffuse fatty infiltration of the liver is noted. The liver, gallbladder, spleen, pancreas, adrenal glands, kidneys, are otherwise unremarkable in appearance. GI/Bowel: The stomach is unremarkable without wall thickening or distention. Scattered diverticula are seen throughout the colon without definitive evidence of adjacent inflammatory changes within the mesenteric fat.   Bowel loops are unremarkable in appearance without evidence of or small bowel obstruction. 2. Gas throughout the small and large bowel may reflect ileus.            [facility] [place] Divisionist

## 2021-01-03 NOTE — PROGRESS NOTES
GENERAL SURGERY PROGRESS NOTE    Raissa Castro is a 77 y.o. female with lower abdominal pain and leukocytosis. S/p ex lap with sigmoid resection and primary anastomosis              Subjective:  Doing ok today. Her pain is stable. She had some indigestion on full liquids today, but no N/V. No BM yet, but passing flatus. Objective:    Vitals: VITALS:  BP (!) 175/76   Pulse 79   Temp 98.6 °F (37 °C) (Oral)   Resp 16   Ht 4' 11\" (1.499 m)   Wt 164 lb (74.4 kg)   SpO2 98%   BMI 33.12 kg/m²     I/O: No intake/output data recorded. Labs/Imaging Results:   Lab Results   Component Value Date     01/01/2021    K 3.0 01/01/2021     01/01/2021    CO2 22 01/01/2021    BUN 10 01/01/2021    CREATININE 0.5 01/01/2021    GLUCOSE 125 01/01/2021    CALCIUM 6.5 01/01/2021      Lab Results   Component Value Date    WBC 8.5 01/01/2021    HGB 10.1 (L) 01/01/2021    HCT 30.8 (L) 01/01/2021    MCV 97.2 01/01/2021     01/01/2021       IV Fluids: sodium chloride Last Rate: 100 mL/hr at 01/03/21 1252    Scheduled Meds: docusate sodium, 100 mg, Oral, BID    influenza virus vaccine, 0.5 mL, Intramuscular, Prior to discharge    aspirin, 81 mg, Oral, BID    sodium chloride flush, 10 mL, Intravenous, BID    atorvastatin, 10 mg, Oral, Nightly    calcium-vitamin D, 2 tablet, Oral, Daily    hydroxychloroquine, 200 mg, Oral, BID    lisinopril-hydroCHLOROthiazide, 1 tablet, Oral, Daily    methotrexate, 10 mg, Oral, Weekly    minocycline, 50 mg, Oral, Nightly    omega-3 acid ethyl esters, 1,000 mg, Oral, Daily    vitamin D, 1,000 Units, Oral, BID    sodium chloride flush, 10 mL, Intravenous, 2 times per day    enoxaparin, 40 mg, Subcutaneous, Daily    pantoprazole, 40 mg, Intravenous, Daily    Autologous Serum 20% OPH SOLN (eye drops, patient own supply), 1 drop, Both Eyes, 4x Daily    Physical Exam:  General: A&O x 3, no distress. HEENT: Anicteric sclerae, MMM.   Extremities: No edema bilat LE.  Abdomen: Soft, incision dressing intact, staples intact, appropriately tender      Assessment and Plan:  77 y.o. female with abdominal pain an leukocytosis. 4 Days Post-Op  from ex lap with sigmoid resection and primary anastomosis.     Patient Active Problem List:     Primary osteoarthritis of left knee     Genu valgum, acquired     Essential hypertension     Sjogren's disease ([facility] [address]     Vitamin D deficiency     Environmental allergies     Mixed hyperlipidemia     Osteoporosis     Abdominal pain      -Continue full liquids today  - continue symptomatic management of pain and nausea  - increase ambulation  - await return of bowel function  - continue colace, added Sebas Harrington MD

## 2021-01-04 NOTE — PROGRESS NOTES
Hospitalist Progress Note         Admit Date: 12/29/2020    PCP: Mariluz Keyes PA-C     Chief Complaint   Patient presents with    Abdominal Pain    Emesis        Assessment and Plan:     -Abdominal pain/nausea and vomiting with no evidence of sepsis  S/p exploratory laparotomy and sigmoid resection. Pain control, IVF. Started full liquid diet.  -Sjogren's syndrome continue methotrexate and Plaquenil.  -Leukocytosis possibly from stress related. Monitor CBC while off antibiotics. -HTN continue lisinopril/HCTZ. Monitor vitals  -Hypokalemia/hypomagnesemia/hypocalcemia on replacement protocol. VTE prophylaxis LMWH  Awaiting for bowel function recovery before DC plan possibly tomorrow.     Current Facility-Administered Medications   Medication Dose Route Frequency Provider Last Rate Last Admin    calcium carbonate (TUMS) chewable tablet 500 mg  500 mg Oral TID PRN Abel Kothari MD        polyethylene glycol [facility]) packet 17 g  17 g Oral Daily Deanna Koo MD   17 g at 01/04/21 0344    docusate sodium (COLACE) capsule 100 mg  100 mg Oral BID Nikkie Gil MD   100 mg at 01/04/21 3796    influenza quadrivalent split vaccine (FLUZONE;FLUARIX;FLULAVAL;AFLURIA) injection 0.5 mL  0.5 mL Intramuscular Prior to discharge Abel Kothari MD        sodium chloride (OCEAN, BABY AYR) 0.65 % nasal spray 1 spray  1 spray Each Nostril PRN Nikkie Gil MD        aspirin chewable tablet 81 mg  81 mg Oral BID Nikkie Gil MD   81 mg at 01/04/21 0921    oxyCODONE-acetaminophen (PERCOCET) 5-325 MG per tablet 1 tablet  1 tablet Oral Q4H PRN Nikkie Gil MD   1 tablet at 01/04/21 1205    morphine sulfate (PF) injection 4 mg  4 mg Intravenous Q2H PRN Nikkie Gil MD   4 mg at 01/03/21 2028    sodium chloride flush 0.9 % injection 10 mL  10 mL Intravenous BID Nikkie Gil MD   Stopped at 01/03/21 2030    ondansetron (ZOFRAN) injection 4 mg  4 mg Intravenous Q30 Min PRN Michelle Albarran MD   4 mg at 12/29/20 0804    atorvastatin (LIPITOR) tablet 10 mg  10 mg Oral Nightly Michelle Albarran MD   10 mg at 01/03/21 2029    calcium-vitamin D (Vickie Hose) 500-200 MG-UNIT per tablet 2 tablet  2 tablet Oral Daily Michelle Albarran MD   2 tablet at 01/04/21 7699    hydroxychloroquine (PLAQUENIL) tablet 200 mg  200 mg Oral BID Michelle Albarran MD   200 mg at 01/04/21 4637    lisinopril-hydroCHLOROthiazide (PRINZIDE;ZESTORETIC) 20-12.5 MG per tablet 1 tablet  1 tablet Oral Daily Michelle Albarran MD   1 tablet at 01/04/21 0920    [Held by provider] methotrexate (RHEUMATREX) chemo tablet 10 mg  10 mg Oral Weekly Michelle Albarran MD        minocycline (MINOCIN;DYNACIN) capsule 50 mg  50 mg Oral Nightly Michelle Albarran MD   50 mg at 01/03/21 2028    omega-3 acid ethyl esters (LOVAZA) capsule 1,000 mg  1,000 mg Oral Daily Michelle Albarran MD   1,000 mg at 01/04/21 6066    vitamin D CAPS 1,000 Units  1,000 Units Oral BID Michelle Albarran MD   1,000 Units at 01/04/21 1251    0.9 % sodium chloride infusion   Intravenous Continuous Michelle Albarran MD   Stopped at 01/04/21 1206    sodium chloride flush 0.9 % injection 10 mL  10 mL Intravenous 2 times per day Michelle Albarran MD   Stopped at 01/03/21 2029    sodium chloride flush 0.9 % injection 10 mL  10 mL Intravenous PRN Michelle Albarran MD        enoxaparin (LOVENOX) injection 40 mg  40 mg Subcutaneous Daily Michelle Albarran MD   40 mg at 01/04/21 5652    promethazine (PHENERGAN) tablet 12.5 mg  12.5 mg Oral Q6H PRN Michelle Albarran MD        Or    ondansetron [facility]) injection 4 mg  4 mg Intravenous Q6H PRN Michelle Albarran MD   4 mg at 01/01/21 0916    polyethylene glycol (GLYCOLAX) packet 17 g  17 g Oral Daily PRN Michelle Albarran MD        acetaminophen (TYLENOL) tablet 650 mg  650 mg Oral Q6H PRN Michelle Albarran MD        Or    acetaminophen (TYLENOL) suppository 650 mg 650 mg Rectal Q6H PRN Vesna Bailey MD        potassium chloride (KLOR-CON M) extended release tablet 40 mEq  40 mEq Oral PRN Vesna Bailey MD   40 mEq at 01/01/21 1741    Or    potassium bicarb-citric acid (EFFER-K) effervescent tablet 40 mEq  40 mEq Oral PRN Vesna Bailey MD        Or    potassium chloride 10 mEq/100 mL IVPB (Peripheral Line)  10 mEq Intravenous PRN Vesna Bailey  mL/hr at 01/01/21 1321 10 mEq at 01/01/21 1321    pantoprazole (PROTONIX) injection 40 mg  40 mg Intravenous Daily Vesna Bailey MD   40 mg at 01/03/21 0749    Autologous Serum 20% OPH SOLN (eye drops, patient own supply)  1 drop Both Eyes 4x Daily Vesna Bailey MD   Stopped at 01/03/21 2051       Subjective:     S/p exploratory laparotomy postop day 5  No acute events since last night  Tolerating ice chips/clears-advancing diet. Objective:     No intake or output data in the 24 hours ending 01/04/21 1320   Vitals:   Vitals:    01/03/21 2048   BP: 129/60   Pulse: 78   Resp: 16   Temp: 99.4 °F (37.4 °C)   SpO2: 97%     Physical Exam:  General Appearance:  Mild distress due to pain +  Head:      Normocephalic, without obvious abnormality, atraumatic  Eyes:       Conjunctiva/corneas clear, EOM's intact  Lungs:    Clear to auscultation bilaterally, respirations unlabored  Heart:                Regular rate and rhythm, S1 and S2 normal, no murmur,   rub or gallop  Abdomen:     Soft, postop changes with incisional tenderness around, BS +  Extremities:   Extremities normal, atraumatic, no cyanosis or edema  Neurological:   Grossly Intact. Significant Diagnostic Studies:   DATA:    CBC   Recent Labs     01/04/21  0639   WBC 5.2   HGB 10.9*   HCT 33.8*         BMP   Recent Labs     01/04/21  0639      K 3.6      CO2 29   BUN 5*   CREATININE 0.6     LFT'S   No results for input(s): AST, ALT, ALB, BILIDIR, BILITOT, ALKPHOS in the last 72 hours.   COAG No results for input(s): appearance without evidence of obstruction, distension or mucosal thickening. Pelvis: The urinary bladder is well distended and unremarkable in appearance. No evidence of pelvic free fluid is seen. Peritoneum/Retroperitoneum: Scattered mild intraperitoneal free fluid is noted. No evidence of intraperitoneal or retroperitoneal lymphadenopathy is identified. Bones/Soft Tissues: The bones, skeletal muscle bundles, fascial planes and subcutaneous soft tissues are unremarkable in appearance. 1. Colonic diverticulosis without definitive evidence of diverticulitis. 2. Mild cardiomegaly. 3. Hepatic steatosis. 4. Mild scattered intraperitoneal free fluid. Xr Chest Portable    Result Date: 12/29/2020  EXAMINATION: ONE XRAY VIEW OF THE CHEST 12/29/2020 2:17 am COMPARISON: Chest radiograph performed December 31, 2016. HISTORY: ORDERING SYSTEM PROVIDED HISTORY: abd pain, vomiting TECHNOLOGIST PROVIDED HISTORY: Reason for exam:->abd pain, vomiting Reason for Exam: abd pain, vomiting Acuity: Acute Type of Exam: Initial FINDINGS: The lungs are clear without focal consolidation, pleural effusion, or pneumothorax. Stable cardiomediastinal silhouette. S-shaped curvature of the visualized thoracolumbar spine. No acute cardiopulmonary findings. Xr Abdomen (2 Views)    Result Date: 12/29/2020  EXAMINATION: TWO XRAY VIEWS OF THE ABDOMEN 12/29/2020 8:59 pm COMPARISON: None. HISTORY: ORDERING SYSTEM PROVIDED HISTORY: abdominal pain, assess gas pattern, rule out free air TECHNOLOGIST PROVIDED HISTORY: Reason for exam:->abdominal pain, assess gas pattern, rule out free air Reason for Exam: abdominal pain, assess gas pattern, rule out free air Acuity: Acute Type of Exam: Initial FINDINGS: No subdeltoid free air. Gas throughout the small and large bowel. No abnormally dilated gas-filled loops of bowel. No pathologic calcification. Severe lumbar levoscoliosis. Mild-to-moderate degenerative changes of the sacroiliac joints. 1. No evidence of free air or small bowel obstruction. 2. Gas throughout the small and large bowel may reflect ileus.            [facility]

## 2021-01-04 NOTE — PROGRESS NOTES
GENERAL SURGERY PROGRESS NOTE    Kapil Lino is a 77 y.o. female with lower abdominal pain and leukocytosis. S/p sigmoid colectomy on 12/30. Subjective:  Doing ok this morning. Passing flatus, No BM. She does feel like she is incompletely voiding with urination. Endorses some left sided abdominal pain.     Objective:    Vitals: VITALS:  /60   Pulse 78   Temp 99.4 °F (37.4 °C) (Oral)   Resp 16   Ht 4' 11\" (1.499 m)   Wt 164 lb (74.4 kg)   SpO2 97%   BMI 33.12 kg/m²     I/O: 01/03 0701 - 01/04 0700  In: 180 [P.O.:180]  Out: -     Labs/Imaging Results:   Lab Results   Component Value Date     01/04/2021    K 3.6 01/04/2021     01/04/2021    CO2 29 01/04/2021    BUN 5 01/04/2021    CREATININE 0.6 01/04/2021    GLUCOSE 90 01/04/2021    CALCIUM 7.8 01/04/2021      Lab Results   Component Value Date    WBC 5.2 01/04/2021    HGB 10.9 (L) 01/04/2021    HCT 33.8 (L) 01/04/2021    MCV 97.7 01/04/2021     01/04/2021       IV Fluids: sodium chloride Last Rate: 100 mL/hr at 01/03/21 1252    Scheduled Meds: polyethylene glycol, 17 g, Oral, Daily    docusate sodium, 100 mg, Oral, BID    influenza virus vaccine, 0.5 mL, Intramuscular, Prior to discharge    aspirin, 81 mg, Oral, BID    sodium chloride flush, 10 mL, Intravenous, BID    atorvastatin, 10 mg, Oral, Nightly    calcium-vitamin D, 2 tablet, Oral, Daily    hydroxychloroquine, 200 mg, Oral, BID    lisinopril-hydroCHLOROthiazide, 1 tablet, Oral, Daily    [Held by provider] methotrexate, 10 mg, Oral, Weekly    minocycline, 50 mg, Oral, Nightly    omega-3 acid ethyl esters, 1,000 mg, Oral, Daily    vitamin D, 1,000 Units, Oral, BID    sodium chloride flush, 10 mL, Intravenous, 2 times per day    enoxaparin, 40 mg, Subcutaneous, Daily    pantoprazole, 40 mg, Intravenous, Daily    Autologous Serum 20% OPH SOLN (eye drops, patient own supply), 1 drop, Both Eyes, 4x Daily    Physical Exam:  General: A&O x 3, no

## 2021-01-04 NOTE — PROGRESS NOTES
RN spoke with Dr. Toi Odell regarding patient no wanting IV team to place IV. Per Dr. Toi Odell, ok to discontinue IV fluids and for patient to not have IV line.

## 2021-01-04 NOTE — CONSULTS
Consult cancelled per protocol - on arrival to bedside pt states Dr. Thais Spurling specifically told her she did not need an IV as staff had been unable to initiate line. Pt is drinking PO fluids without n/v and has no scheduled IV meds ordered. Maren, Primary RN, notified and will recheck with Hospitalist that pt doesn't need IV and reenter consult if pt requires access.

## 2021-01-04 NOTE — CARE COORDINATION
Cm into see pt. Pt is sitting up in chair. CM discussed home care with pt and she is agreeable. Pt lives with her spouse. Pt did not have a preference. Cm placed a PS to [facility] with referral. Pt is complaining of feeling cold and increase pain. CM notified nursing.  [address]

## 2021-01-04 NOTE — CONSULTS
[facility]  Gastroenterology Consultation    2021  8:08 AM    Patient:    Floyd Richmond  : 1945   77 y.o. MRN: [id number]  Admitted: 2020  2:10 AM ATT: MD Christian   0824/2320-D  AdmitDx: Abdominal pain [R10.9]  Lower abdominal pain [R10.30]  Leukocytosis, unspecified type [D72.829]  Non-intractable vomiting with nausea, unspecified vomiting type [R11.2]  PCP: ELKE HahnC    Reason for Consult:  Hx of GERD, abdominal pain    Requesting Physician:  MD Christian    History Obtained From:  Patient and review of all records    HISTORY OF PRESENT ILLNESS:                The patient is a 77 y.o. female with significant   Past Medical History:   Diagnosis Date    Arthritis     Dry eye     GERD (gastroesophageal reflux disease)     Hiatal hernia     Hyperlipidemia     Hypertension     Osteopenia     Osteoporosis     TIA (transient ischemic attack)     who presented to [facility] ED with sudden abdominal pain, nausea and vomiting, s/p sigmoid colon resection. Reports longstanding hx of GERD. Used to take Zantac, but taken off of the market and has not been taking medication to control her acid reflux.  Reports hx of constipation    Colonoscopy  unable to pass pedi scope pass descending colon d/t angulated colon    History of EGD 2019 small hiatal hernia, otherwise normal, no biopsies,   EGD 2018 grade b esophagitis, shatzki ring dilated     ASA 81 mg daily  Denies NSAIDs  No Anti-platelet therapy    Past Medical History:        Diagnosis Date    Arthritis     Dry eye     GERD (gastroesophageal reflux disease)     Hiatal hernia     Hyperlipidemia     Hypertension     Osteopenia     Osteoporosis     TIA (transient ischemic attack)        Past Surgical History:        Procedure Laterality Date    BACK SURGERY      scoliosis    CARPAL TUNNEL RELEASE      CYST REMOVAL      ESOPHAGEAL DILATATION      HYSTERECTOMY      Total    JOINT REPLACEMENT  09/26/1916    knee L    LAPAROTOMY N/A 12/30/2020    LAPAROTOMY EXPLORATORY WITH DISTAL SIGMOID COLON RESECTION performed by Tracey Herrera MD at [facility] OR         Current Medications:    Medications    Scheduled Medications:    polyethylene glycol  17 g Oral Daily    docusate sodium  100 mg Oral BID    influenza virus vaccine  0.5 mL Intramuscular Prior to discharge    aspirin  81 mg Oral BID    sodium chloride flush  10 mL Intravenous BID    atorvastatin  10 mg Oral Nightly    calcium-vitamin D  2 tablet Oral Daily    hydroxychloroquine  200 mg Oral BID    lisinopril-hydroCHLOROthiazide  1 tablet Oral Daily    methotrexate  10 mg Oral Weekly    minocycline  50 mg Oral Nightly    omega-3 acid ethyl esters  1,000 mg Oral Daily    vitamin D  1,000 Units Oral BID    sodium chloride flush  10 mL Intravenous 2 times per day    enoxaparin  40 mg Subcutaneous Daily    pantoprazole  40 mg Intravenous Daily    Autologous Serum 20% OPH SOLN (eye drops, patient own supply)  1 drop Both Eyes 4x Daily     PRN Medications: calcium carbonate, sodium chloride, oxyCODONE-acetaminophen, morphine, ondansetron, sodium chloride flush, promethazine **OR** ondansetron, polyethylene glycol, acetaminophen **OR** acetaminophen, potassium chloride **OR** potassium alternative oral replacement **OR** potassium chloride      Allergies:  Omnaris [ciclesonide]    Social History:   TOBACCO:   reports that she has never smoked. She has never used smokeless tobacco.  ETOH:   reports no history of alcohol use. Family History:       Problem Relation Age of Onset    Stroke Father        No family history of colon cancer, Crohn's disease, or ulcerative colitis.     REVIEW OF SYSTEMS:    The positive ROS will be identified in bold, otherwise ROS are negative     CONSTITUTIONAL:  Neg   Recent weight changes, fatigue, fever, chills or night sweats  EYES:  Neg  Blurriness, earing, itching or acute change in vision  EARS:  Neg  hearing loss, tinnitus, vertigo, discharge or earache. NOSE:  Neg  Rhinorrhea, sneezing, itching, allergy or epistaxis  MOUTH/THROAT:  Neg  bleeding gums, hoarseness or sore throat. RESPIRATORY:   Neg SOB, wheeze, cough, sputum, hemoptysis or bronchitis  CARDIOVASCULAR:  Neg chest pain, palpitations, dyspnea on exertion, orthopnea, paroxysmal nocturnal dyspnea or edema  GASTROINTESTINAL:  SEE HPI  GENITOURINARY:  Neg  Urinary frequency, hesitancy, urgency, polyuria, dysuria, hematuria, nocturia, or incontinence. HEMATOLOGIC/LYMPHATIC:  Neg  Anemia, bleeding tendency  MUSCULOSKELETAL:    New myalgias, bone pain, joint pain, swelling or stiffness and has had change in gait. NEUROLOGICAL:  Neg  Loss of Consciousness, memory loss, forgetfulness, periods of confusion, difficulty concentrating, seizures, decline in intellect, nervousness, insomina, aphasia or dysarthria. SKIN:  Neg  skin or hair changes, and has no itching, rashes, or sores. PSYCHIATRIC:  Neg depression, personality changes, anxiety. ENDOCRINE:  Neg polydipsia, polyuria, abnormal weight changes, heat /cold intolerance.   ALL/IMM:  Neg reactions to drugs other than listed    PHYSICAL EXAM:      Vitals:    /60   Pulse 78   Temp 99.4 °F (37.4 °C) (Oral)   Resp 16   Ht 4' 11\" (1.499 m)   Wt 164 lb (74.4 kg)   SpO2 97%   BMI 33.12 kg/m²     General Appearance:    Alert, cooperative, no distress, appears stated age   HEENT:    Normocephalic, atraumatic, Conjunctiva clear, Lips, mucosa, and tongue normal; teeth and gums normal   Neck:   Supple, symmetrical, trachea midline   Lungs:     Clear to auscultation bilaterally, respirations unlabored   Chest Wall:    No tenderness or deformity    Heart:    Regular rate and rhythm, S1 and S2 normal, no murmur, rub   or gallop   Abdomen:     Soft, LLQ tender, bowel sounds active all four quadrants,     no masses, no organomegaly, no ascites    Rectal:    Deferred   Extremities: Extremities normal, atraumatic, no cyanosis. 2+ edema   Pulses:   2+ and symmetric all extremities   Skin:   Skin color, texture, turgor normal, no rashes or lesions, mid abdominal incision well approximated    Lymph nodes:   No abnormality   Neurologic:   No focal deficits, moving all four extremities        DATA:    ABGs: No results found for: PHART, PO2ART, YVL7BPL  CBC:   Recent Labs     01/04/21  0639   WBC 5.2   HGB 10.9*        BMP:    Recent Labs     01/04/21  0639      K 3.6      CO2 29   BUN 5*   CREATININE 0.6   GLUCOSE 90     Magnesium:   Lab Results   Component Value Date    MG 1.6 01/01/2021     Hepatic: No results for input(s): AST, ALT, ALB, BILITOT, ALKPHOS in the last 72 hours. No results for input(s): LIPASE, AMYLASE in the last 72 hours. No results for input(s): PROTIME, INR in the last 72 hours. No results for input(s): PTT in the last 72 hours. Lipids: No results for input(s): CHOL, HDL in the last 72 hours. Invalid input(s): LDLCALCU  INR: No results for input(s): INR in the last 72 hours. TSH: No results found for: TSH    Intake/Output Summary (Last 24 hours) at 1/4/2021 0808  Last data filed at 1/3/2021 0936  Gross per 24 hour   Intake 180 ml   Output --   Net 180 ml      sodium chloride 100 mL/hr at 01/03/21 1252       Imaging Studies: Reviewed     1. Colonic diverticulosis without definitive evidence of diverticulitis. 2. Mild cardiomegaly. 3. Hepatic steatosis. 4. Mild scattered intraperitoneal free fluid.        IMPRESSION:      Patient Active Problem List   Diagnosis Code    Primary osteoarthritis of left knee M17.12    Genu valgum, acquired M21.069    Essential hypertension I10    Sjogren's disease ([facility] [address] M35.00    Vitamin D deficiency E55.9    Environmental allergies Z91.09    Mixed hyperlipidemia E78.2    Osteoporosis M81.0    Abdominal pain R10.9       ASSESSMENT/RECOMMENDATIONS:    Hx of GERD, abdominal pain:  Improved   S/p exp lap with sigmoid resection and primary anastamosis  Maintain PPI daily at discharge   Diet as tolerated per surgery  Continue bowel regimen as recommended by surgery  Follow up outpatient     Will sign off. Call if further needs arise      Discussed plan of care with patient and Kasey Garnett RN    Patient clinical, biochemical, and radiological information discussed with Dr. Paulina Burris. He agrees with the assessment and plan. Francisco Cheek CNP  1/4/2021  8:08 AM     C/o of pain  Advised to call Surgical team    I have seen and examined this patient personally, and independently of the nurse practitioner. The plan was developed mutually at the time of the visit with the patient. Lg Gaona and myself have spoken with patient, nursing staff and provided written and verbal instructions .     The above note has been reviewed and I agree with the Assessment,  Diagnosis, and Treatment plan as suggested by [facility] gastro[facility]ology

## 2021-01-04 NOTE — PROGRESS NOTES
Physical Therapy    Physical Therapy Treatment Note  Name: Chris Jones MRN: [id number] :   1945   Date:  2021   Admission Date: 2020 Room:  [address] [place], [place] [address]   Restrictions/Precautions:        fall risk,abdominal precautions  Communication with other providers:  Per nurse pt was suppose to get IV for IV pain meds but then refused IV team. Nurse reports pt not due for oral pain meds at this time  Subjective:  Patient states:  Pt reports abdominal pain but would like to try to amb. Once up amb Pt states she needs to have a BM that her bowels feel like they will \"explode\". Pt declined amb after time in bathroom. Pain:   Location, Type, Intensity (0/10 to 10/10):   Pt c/o  Pain 10 and nurse notified. Objective:    Observation:  Alert and oriented  Treatment, including education/measures:  Sit<=>stand from chair and commode sba. Pt needing extended time on commode. Nurse notified pt had very small BM. amb with rw 10' x 2 sba  Safety  Patient left safely in the bed, with call light/phone in reach with alarm applied. Gait belt and mask were used for transfers and gait. Assessment / Impression:       Patient's tolerance of treatment:  good   Adverse Reaction: na  Significant change in status and impact:  na  Barriers to improvement:  none  Plan for Next Session:    Cont.  POC  Time in:  1345  Time out:  1410  Timed treatment minutes:  25  Total treatment time:  25    Previously filed items:  Social/Functional History  Lives With: Spouse(in good health and available )  Type of Home: House  Home Layout: One level  Home Access: Stairs to enter with rails  Entrance Stairs - Number of Steps: 14 (from basement level garage)  Entrance Stairs - Rails: Right  Bathroom Shower/Tub: Tub/Shower unit  Bathroom Toilet: Handicap height  Bathroom Equipment: Grab bars in shower  Bathroom Accessibility: Accessible  Home Equipment: Rolling walker, 4 wheeled walker  ADL Assistance: [address] [place] [address] Avenue: Independent  Homemaking Responsibilities: Yes  Ambulation Assistance: Independent(uses no AD, briefly used 4ww after TKA)  Transfer Assistance: Independent  Active : Yes  Occupation: Retired  Short term goals  Time Frame for [employer] term goals: 1 week  Short term goal 1: Pt will perform sit><supine SBA  Short term goal 2: Pt will transfer to all surfaces SBA  Short term goal 3: Pt will ambulate 200ft with LRAD SBA  Short term goal 4: Pt will ascend/descend 1 flight of stairs, single rail SBA       Electronically signed by:    Alberot Borrego PTA  1/4/2021, 8:25 AM

## 2021-01-05 NOTE — PROGRESS NOTES
Pt decreased O2 sat, attempted to suction, increased to 100%, tachypnic, HR with several PVCs. Given PRN dilaudid to try to slow RR to improve O2 sats.

## 2021-01-05 NOTE — PROGRESS NOTES
Physician Progress Note      Paz Fernando  [facility] #:                  [id number]  :                       1945  ADMIT DATE:       2020 2:10 AM  DISCH DATE:  RESPONDING  PROVIDER #:        Natalio Davis MD          QUERY TEXT:    Dear Hospitalist    Pt admitted with Intestinal Obstruction. Noted documentation of Septic Shock    on  Cx note  by ordered Nephrology consultant. If possible, please   document in progress notes and discharge summary:    The medical record reflects the following:  Risk Factors: Intestinal Obstruction, Post op bowel resection, Respiratory   fail, cardiac arrest, ATN, Ischemia and infarction of Kidney  Clinical Indicators: \"abd pain sp bowel obstruction, cardiac arrest,  met   acidosis , septic shock,  resp failure,  renal infarct with decrease uop, WBC   20.0 on admission -3.8, Segs 91.3,   on admission, \"Currently on 4   pressors. Lactic acid 7.3. Arterial pH 7.19.\"  PN  Treatment: Vasopressin, albumin ,monitor pan culture ,on abx, iv bicarb ,na   monitor with crrt , Vented, Follow up to OR for washout and colostomy, Nick, 4   rounds of CPR requiring defibrillation x2,. CT abdomen pelvis, Vent management   per pulmonary ,Cardiac consultation, Nephrology consultation, Lactic acid   every 4 hours Continue empiric antibiotics with vancomycin and cefepime    Thank you Iris Hitchcock RN, CDS ([phone])  Options provided:  -- Septic Shock  -- Cardiogenic Shock  -- Defer to Nephrology consultant documentation regarding Septic  -- Other - I will add my own diagnosis  -- Disagree - Not applicable / Not valid  -- Disagree - Clinically unable to determine / Unknown  -- Refer to Clinical Documentation Reviewer    PROVIDER RESPONSE TEXT:    This patient is in cardiogenic shock. Query created by: Sabi Og on 2021 12:09 PM      QUERY TEXT:    Dear Hospitalist/ Dr Roro Walker    Pt admitted with Intestinal obstruction.   Pt noted to have  Post op Illeus . If possible, please document in progress notes and discharge summary:    The medical record reflects the following:  Risk Factors: Intestinal Obstruction, Post op bowel resection, Respiratory   fail, cardiac arrest, ATN, Ischemia and infarction of Kidney  Clinical Indicators:\"suspect Sarah aspirated due to post op ileus which   resulted in her decline and code. Anastomotic leak from her bowel surgery is   also possible. \" \"Free air and fluid is noted in the abdomen, Taken back to   the OR early this morning for washout and colostomy after anastomotic leak\"   Documented in 1/5 PN  Treatment: Vasopressin, albumin ,monitor pan culture ,on abx, iv bicarb ,na   monitor with crrt , Vented, Follow up to OR for washout and colostomy, Nick, 4   rounds of CPR requiring defibrillation x2,. CT abdomen pelvis, Vent management   per pulmonary ,Cardiac consultation, Nephrology consultation, Lactic acid   every 4 hours Continue empiric antibiotics with vancomycin and cefepime  Thank you Toro Givens RN, CDS ([phone])  Options provided:  -- Post op ileus  as an postoperative complication  -- Post op ileus  as an expected/inherent condition that occurred   postoperatively and not a complication  -- Post op ileus  related to other incidental risk factor (please specify)   occurring following surgery and not a complication, Please document incidental   risk factor. -- Other - I will add my own diagnosis  -- Disagree - Not applicable / Not valid  -- Disagree - Clinically unable to determine / Unknown  -- Refer to Clinical Documentation Reviewer    PROVIDER RESPONSE TEXT:    Patient has a post op ileus as an expected condition that occurred   postoperatively and not a complication.     Query created by: Bogdan Alarcon on 1/5/2021 12:17 PM      Electronically signed by:  Estrella Sanders MD 1/5/2021 12:28 PM

## 2021-01-05 NOTE — OP NOTE
Operative Note      Patient: Kapil Lino  YOB: 1945  MRN: [id number]    Date of Procedure: 1/5/2021    Pre-Op Diagnosis: abdominal free air, leaking from anastomosis    Post-Op Diagnosis: Same       Procedure(s):  LAPAROTOMY EXPLORATORY, ABDOMINAL WASHOUT, RESECTION OF ANASTOMOSIS, COLOSTOMY CREATION    Surgeon(s):  Mirella Cade MD    Assistant:   * No surgical staff found *    Anesthesia: General    Estimated Blood Loss (mL): 53JU    Complications: Other: anastomotic leak after prior sigmoid resection    Specimens:   ID Type Source Tests Collected by Time Destination   A : Anastomosis Tissue Tissue SURGICAL PATHOLOGY Mirella Cade MD 1/5/2021 0230        Implants:  * No implants in log *      Drains:   Closed/Suction Drain Right RLQ Bulb 19 Martiniquais (Active)       NG/OG/NJ/NE Tube Nasogastric 16 fr Left nostril (Active)   Surrounding Skin Dry; Intact 01/04/21 2325   Securement device Yes 01/04/21 2325   Status Suction-low continuous 01/04/21 2325   Placement Verified by X-Ray (Initial);by External Catheter Length;by Gastric Contents 01/04/21 2325   NG/OG/NJ/NE External Measurement (cm) 65 cm 01/04/21 2325   Drainage Appearance Bile 01/04/21 2325   Output (mL) 600 ml 01/05/21 0100       Urethral Catheter (Active)   $ Urethral catheter insertion $ Not inserted for procedure 01/04/21 2325   Catheter Indications Need for fluid management in critically ill patients in a critical care setting not able to be managed by other means such as BSC with hat, bedpan, urinal, condom catheter, or short term intermittent urethral catherization 01/04/21 2325   Site Assessment Pink 01/04/21 2325   Urine Color Yellow 01/04/21 2325   Urine Appearance Cloudy 01/04/21 2325       [REMOVED] NG/OG/NJ/NE Tube Nasogastric Right nostril (Removed)   Surrounding Skin Dry; Intact 12/30/20 1340   Securement device Yes 12/30/20 1340   Status Suction-low intermittent 12/30/20 1340   NG/OG/NJ/NE External Measurement (cm) 52 cm 12/30/20 1340   Drainage Appearance Tan 12/30/20 1340   Output (mL) 0 ml 12/30/20 1720       [REMOVED] Urethral Catheter (Removed)   $ Urethral catheter insertion $ Not inserted for procedure 12/29/20 1848   Catheter Indications Acute urinary retention/obstruction 12/31/20 1232   Site Assessment Pink; No urethral drainage 12/31/20 1232   Urine Color Yellow 12/31/20 1232   Urine Appearance Clear 12/31/20 1232   Output (mL) 750 mL 12/29/20 1848       Findings: large amount of spilled stool within the abdomen    Detailed Description of Procedure:   Procedure Details   The patient was seen in the ICU after she decompensated last evening requiring intubation and blood pressure support. The risks, benefits, complications, treatment options, and expected outcomes were discussed with the patient's family. They concurred with the proposed plan, giving informed consent. The patient was taken to the operating room, identified and the procedure verified as the consent form. A Time Out was held and the above information confirmed. Procedure Description    The patient was brought into the operating room placed supine. After induction of anesthesia the abdomen was prepped and draped in a sterile fashion. Christie catheter was already in place. The midline incision from her previous operation was reopened. Fascial sutures were cut and removed allowing access to the abdomen. There was copious liquid stool encountered upon entering the abdomen. This was suctioned clean. The area of previous anastomosis in the pelvis was examined and found to be the source of leak as expected. A window was made beneath the sigmoid colon proximal to the anastomosis and a TLC 75 stapler with a blue load was fired across the bowel. Ligasure was then used to take down the mesentery in a proximal to distal direction until I was past the anastomosis. A contour stapler was then fired to transect the bowel at the rectosidmoid junction.   The end was reinforced with 3-0 vicryl suture and marked with a 3-0 prolene suture. At this point the soilage was controlled. The abdomen was copiously irrigated until returns were clear. A 19fr Bryan drain was left in the pelvis and brought out through the RLQ. The proximal end of the sigmoid was then brought out through the LLQ abdominal wall to create an ostomy. This was done by resecting a quarter sized piece of skin with Bovie. The anterior and posterior fascia were then scored with electrocautery. The aperture was made large enough to fit 2 fingers. The sigmoid was then brought through the abdominal wall by grasping the staple line with a Abigail clamp. Copious irrigation of the abdominal cavity was performed and hemostasis was achieved to my satisfaction. The abdominal wall fascia was closed using #1 looped PDS suture. The subcutaneous tissue was packed open with saline dampened Kerlex. The ostomy was then matured by cutting off the staple line and using 3-0 vicryl sutures to secure the bowel to the skin with Brooking type stitches. Appliance was placed. The patient was ultimately transferred back to her ICU room in guarded condition.     Electronically signed by Trenton Griffin MD on 1/5/2021 at 2:46 AM                  Electronically signed by Trenton Griffin MD on 1/5/2021 at 2:46 AM

## 2021-01-05 NOTE — ANESTHESIA POSTPROCEDURE EVALUATION
Department of Anesthesiology  Postprocedure Note    Patient: Carissa Pereira  MRN: [id number]  YOB: 1945  Date of evaluation: 1/5/2021  Time:  3:10 AM     Procedure Summary     Date: 01/05/21 Room / Location: [place] [address] / [facility]    Anesthesia Start: 0106 Anesthesia Stop: 1473    Procedure: LAPAROTOMY EXPLORATORY, ABDOMINAL WASHOUT, RESECTION OF ANASTOMOSIS, COLOSTOMY CREATION (N/A Abdomen) Diagnosis: (abdominal free air, leaking from anastomosis)    Surgeons: Noa Paul MD Responsible Provider: Miguel Angel Johnson MD    Anesthesia Type: general ASA Status: 5 - Emergent          Anesthesia Type: No value filed. Arely Phase I: Arely Score: 8    Arely Phase II:      Last vitals: Reviewed and per EMR flowsheets.        Anesthesia Post Evaluation    Patient location during evaluation: ICU  Patient participation: complete - patient cannot participate  Level of consciousness: awake and alert and sedated and ventilated  Pain score: 0  Airway patency: patent  Nausea & Vomiting: no nausea and no vomiting  Complications: no  Cardiovascular status: blood pressure returned to baseline, vasoactive/inotropes and hemodynamically stable  Respiratory status: acceptable  Hydration status: stable

## 2021-01-05 NOTE — PROGRESS NOTES
Pt arrived to room with RT and RN on vent. Transferred to ICU bed. Color noted to be grey. Abd soft. While attaching to monitor and receiving report zoll rhythm noted to change to a wide complex and progressively lilibeth. No pulse noted. Code status confirmed with floor RN, code blue started. See Code documentation.

## 2021-01-05 NOTE — PROGRESS NOTES
Arrived to pt's room to place a temporary dialysis catheter. Pt is on a ventilator. Triple lumen catheter x 24 cm placed in the right femoral vein by Dr Miguel White. Brisk blood return noted. Catheter sutured in place. Biopatch, sterile dressing applied. KUB ordered to verify placement.      Report given to Mónica Garcia

## 2021-01-05 NOTE — CONSULTS
bowel obstruction  2 cardiac arrest  3 met acidosis   4 septic shock  5 resp failure  6 renal infarct with decrease uop  7 hyponatremia    Plan  1 sp surgery this am and monitor  2 cardio monitor  3 gave iv bicarb and with lactic acid will start on crrt in above and net 0 uf  4 on pressor and albumin and monitor pan cutlre on abx  5 maitnain vent  6 fu uop in above setting with atn and contrast and start dialysis mostly for the acidosis as felt will get worse with overall status  7 na monitor with crrt  Will follow  dw daughter    Electronically signed by Fermin Dao MD on 1/5/2021 at 11:02 AM

## 2021-01-05 NOTE — PROGRESS NOTES
Physical Therapy  Pt is s/p sigmoid colectomy on 12/30. Taken back to the OR early this morning for washout and colostomy after anastomotic leak. Pt remains intubated. Will need re-assessed by therapy once medically stable.

## 2021-01-05 NOTE — PROGRESS NOTES
dw family and daughters and spouse decided on dnr cc-a and maintain care but not do cpr if heart stop.  On crrt now and bp in 62s

## 2021-01-05 NOTE — CONSULTS
Comprehensive Nutrition Assessment    Type and Reason for Visit:  Consult    Nutrition Recommendations/Plan:   Recommend continuing NPO until patient more hemodynamically stable  Will reassess EN appropriateness in 24 hr    Nutrition Assessment:  Pt intubated, +4 pressors, s/p ex lap, abdominal washout, resection of anastomosis, and colostomy creation, +NGT, dietitian consult for tube feed order and manage, pt at high nutrition risk at this time    Estimated Daily Nutrient Needs:  Energy (kcal):  1966([facility] 2010); Weight Used for Energy Requirements:  Current     Protein (g):  86+(2.0 g/kg); Weight Used for Protein Requirements:  Ideal        Fluid (ml/day):  1900; Method Used for Fluid Requirements:  1 ml/kcal      Wounds:  Surgical Incision       Current Nutrition Therapies:    Diet NPO Effective Now Exceptions are: Sips with Meds, Ice Chips, Popsicles    Anthropometric Measures:  · Height: 4' 11.02\" (149.9 cm)  · Current Body Weight: 164 lb 0.4 oz (74.4 kg)(unknown weight source)   · Usual Body Weight: (no weight history to review)     · Ideal Body Weight: 95 lbs; % Ideal Body Weight 172.7 %   · BMI: 33.1  · BMI Categories: Obese Class 1 (BMI 30.0-34. 9)       Nutrition Diagnosis:   · Inadequate oral intake related to impaired respiratory function as evidenced by NPO or clear liquid status due to medical condition    Nutrition Interventions:   Food and/or Nutrient Delivery:  Continue NPO  Nutrition Education/Counseling:  No recommendation at this time   Coordination of Nutrition Care:  Continue to monitor while inpatient    Goals:  pt will have nutrition source by day 5 LOS       Nutrition Monitoring and Evaluation:   Food/Nutrient Intake Outcomes:  Enteral Nutrition Intake/Tolerance  Physical Signs/Symptoms Outcomes:  Biochemical Data, Weight, GI Status, Hemodynamic Status, Fluid Status or Edema     Discharge Planning:     Too soon to determine     Electronically signed by Lily Oppenheim, MS, RD, LD on 1/5/21 at 4:25 PM EST    Contact: [phone]

## 2021-01-05 NOTE — PROCEDURES
Patient Name: Neena Quesada   Medical Record Number: [id number]  Date: 1/4/2021   Time: 11:25 PM   Room/Bed: 2128/2128-A    Arterial Line Placement Procedure Note      Indication: Hypotension on pressors    Consent: Unable to be obtained due to patient's condition. Procedure: The patient was positioned appropriately and the skin over the right radial artery was prepped with chlorhexidine. Arterial line was placed using the kit and US guidance then attached to pressure bag setup by nursing staff. The catheter was then securely fastened to the skin with sutures and covered with a sterile dressing. The patient tolerated the procedure well.     Complications: None    Electronically signed by Sylvia Mayo MD on 1/4/2021 at 11:25 PM

## 2021-01-05 NOTE — PROGRESS NOTES
Hospitalist Progress Note      Name:  Abby Oglesby /Age/Sex: 1945  (48 y.o. female)   MRN & CSN:  [id number] & [id number] Admission Date/Time: 2020  2:10 AM   Location:  -A PCP: Yemi Rm, [facility] Day: 8    ASSESSMENT/PLAN:  Abby Oglesby is a 77 y.o.  female who presented to the hospital with a complaint of abdominal pain and vomiting. Initial CT abdomen pelvis at the time of admission did not show any acute pathology. Patient subsequently underwent exploratory laparotomy on . Intraoperatively, the patient was noted to have a large bowel obstruction. She underwent 10 cm sigmoid colon resection with anastomosis on . Patient has been clinically doing fairly well throughout the hospital course. Unfortunately, the patient became altered and subsequently had a respiratory arrest leading to intubation in the evening of 21. She subsequent had a cardiac arrest and had 4 rounds of CPR requiring defibrillation x2. Repeat CT abdomen pelvis was done on 21. CT abdomen pelvis demonstrated peritoneal free air and free fluid. Patient was subsequently taken to the OR again  On 21 and underwent exploratory laparotomy with ABDOMINAL WASHOUT, RESECTION OF ANASTOMOSIS, COLOSTOMY CREATION. She was noted to be acidotic with pH of 7.19 with lactic acidosis of 7.3 and normal bicarb most recently. She is currently on 4 pressors and remains hypotensive. She is not requiring any sedation and is not responsive. The pupil is symmetrically dilated but slightly responsive to light. The patient is critically ill. She is currently full code. #.  Cardiac arrest & acute hypoxic respiratory failure ---s/p 4 rounds of CPR. Critically ill. Currently on 4 pressors. Lactic acid 7.3. Arterial pH 7.19. She is not requiring sedation. Not following commands.   Pupils dilated but slightly responsive to light.  -Vent management per pulmonary  -Cardiac Not requiring sedation. EYES: Pupils dilated. Slightly reactive to light. HENT: Membranes dry. NECK: Supple  RESP: Symmetric chest expansion. Accessory muscle use. CV: RRR. No pitting extremity edema. GI: Colostomy bag in place. Surgical incisional site with dressing. : Christie in place. MSK: No bony fractures. No gross deformities.   SKIN: warm, dry, no rashes, no pressure ulcer    Medications:   Medications:    [START ON 1/8/2021] hydroxychloroquine  200 mg Oral BID    albuterol sulfate HFA  4 puff Inhalation Q4H    And    ipratropium  4 puff Inhalation Q4H    famotidine (PEPCID) injection  20 mg Intravenous BID    sodium chloride  1,000 mL Intravenous Once    potassium chloride  20 mEq Intravenous Once    amiodarone  150 mg Intravenous Once    albumin human        albumin human  25 g Intravenous Once    chlorhexidine  15 mL Mouth/Throat BID    piperacillin-tazobactam  3.375 g Intravenous Q6H    vancomycin  1,250 mg Intravenous Q18H    polyethylene glycol  17 g Oral Daily    influenza virus vaccine  0.5 mL Intramuscular Prior to discharge    sodium chloride flush  10 mL Intravenous BID    lisinopril-hydroCHLOROthiazide  1 tablet Oral Daily    [Held by provider] methotrexate  10 mg Oral Weekly    omega-3 acid ethyl esters  1,000 mg Oral Daily    sodium chloride flush  10 mL Intravenous 2 times per day    enoxaparin  40 mg Subcutaneous Daily    Autologous Serum 20% OPH SOLN (eye drops, patient own supply)  1 drop Both Eyes 4x Daily      Infusions:    fentanyl      propofol      phenylephrine (CATHERINE-SYNEPHRINE) 50mg/250mL infusion 150 mcg/min (01/05/21 0922)    sodium bicarbonate infusion 150 mL/hr at 01/05/21 0830    amiodarone      Followed by   Ruben Esteves amiodarone      dexmedetomidine (PRECEDEX) IV infusion      sodium chloride 100 mL/hr at 01/04/21 2150    norepinephrine 24 mcg/min (01/05/21 0248)    EPINEPHrine 30 mcg/min (01/05/21 0739)    vasopressin (Septic Shock) infusion 0.04 Units/min (01/05/21 0711)     PRN Meds:     HYDROmorphone, 0.5 mg, Q4H PRN      promethazine, 12.5 mg, Q6H PRN    Or      ondansetron, 4 mg, Q6H PRN      calcium carbonate, 500 mg, TID PRN      sodium chloride, 1 spray, PRN      oxyCODONE-acetaminophen, 1 tablet, Q4H PRN      morphine, 4 mg, Q2H PRN      sodium chloride flush, 10 mL, PRN      polyethylene glycol, 17 g, Daily PRN      acetaminophen, 650 mg, Q6H PRN    Or      acetaminophen, 650 mg, Q6H PRN      potassium chloride, 40 mEq, PRN    Or      potassium alternative oral replacement, 40 mEq, PRN    Or      potassium chloride, 10 mEq, PRN        Electronically signed by Leopold Cuba, MD on 1/5/2021 at 9:25 AM

## 2021-01-05 NOTE — PROCEDURES
Patient Name: Kayleigh Reardon   Medical Record Number: [id number]  Date: 1/4/2021   Time: 10:35 PM   Room/Bed: 2128/2128-A      Ultrasound-Guided Central Line Placement Procedure Note  Indication: centrally administered medications    Consent: Unable to be obtained due to the emergent nature of this procedure. Procedure: The patient was positioned appropriately and the skin over the left internal jugular vein was prepped with betadine and draped in a sterile fashion. Local anesthesia was obtained by infiltration using 1% Lidocaine without epinephrine. A large bore needle was used to identify the vein under sterile ultrasound guidance. A guide wire was then inserted into the vein through the needle and advanced without resistance. A skin incision was made at the wire and the tract was dilated. A triple lumen catheter was then inserted into the vessel over the guide wire using Seldinger technique. All ports showed good, free flowing blood return and were flushed with saline solution. The catheter was then securely fastened to the skin with sutures and with an adhesive dressing and covered with a sterile dressing. A post procedure X-ray was not ordered because the pt will be going for CT chest after procedure and that study can verify placement. The patient tolerated the procedure well. Of note, also attempted was placement of radial arterial line; however, pt's radial artery was very constricted on ultrasound was unable to be accomplished.     Complications: None    SARABJIT Shetty MD

## 2021-01-05 NOTE — PROGRESS NOTES
Arrived to pt's room to place a temporary dialysis catheter. Pt is on a ventilator. Informed consent obtained by family. Dr Wilberto Conn at bedside. US images obtained. Temporary dialysis catheter: triple lumen x 16 cm catheter placed in the right IJ. Sutured in place, biopatch, sterile dressing applied. Heparin locked. Post chest xray ordered to verify placement.      Report given to Rebel Dao

## 2021-01-05 NOTE — PLAN OF CARE
Problem: Pain:  Description: Pain management should include both nonpharmacologic and pharmacologic interventions. Goal: Pain level will decrease  Description: Pain level will decrease  Outcome: Ongoing  Goal: Control of acute pain  Description: Control of acute pain  Outcome: Ongoing  Goal: Control of chronic pain  Description: Control of chronic pain  Outcome: Ongoing     Problem: Falls - Risk of:  Goal: Will remain free from falls  Description: Will remain free from falls  Outcome: Ongoing  Goal: Absence of physical injury  Description: Absence of physical injury  Outcome: Ongoing     Problem: Skin Integrity:  Goal: Will show no infection signs and symptoms  Description: Will show no infection signs and symptoms  Outcome: Ongoing  Goal: Absence of new skin breakdown  Description: Absence of new skin breakdown  Outcome: Ongoing     Problem: Discharge Planning:  Goal: Participates in care planning  Description: Participates in care planning  Outcome: Ongoing  Goal: Discharged to appropriate level of care  Description: Discharged to appropriate level of care  Outcome: Ongoing     Problem: Airway Clearance - Ineffective:  Goal: Ability to maintain a clear airway will improve  Description: Ability to maintain a clear airway will improve  Outcome: Ongoing     Problem: Anxiety/Stress:  Goal: Level of anxiety will decrease  Description: Level of anxiety will decrease  Outcome: Ongoing     Problem: Aspiration:  Goal: Absence of aspiration  Description: Absence of aspiration  Outcome: Ongoing     Problem:  Bowel Function - Altered:  Goal: Bowel elimination is within specified parameters  Description: Bowel elimination is within specified parameters  Outcome: Ongoing     Problem: Cardiac Output - Decreased:  Goal: Hemodynamic stability will improve  Description: Hemodynamic stability will improve  Outcome: Ongoing     Problem: Fluid Volume - Imbalance:  Goal: Absence of imbalanced fluid volume signs and symptoms  Description: Absence of imbalanced fluid volume signs and symptoms  Outcome: Ongoing     Problem: Gas Exchange - Impaired:  Goal: Levels of oxygenation will improve  Description: Levels of oxygenation will improve  Outcome: Ongoing     Problem: Mental Status - Impaired:  Goal: Mental status will be restored to baseline  Description: Mental status will be restored to baseline  Outcome: Ongoing     Problem: Nutrition Deficit:  Goal: Ability to achieve adequate nutritional intake will improve  Description: Ability to achieve adequate nutritional intake will improve  Outcome: Ongoing     Problem: Pain:  Description: Pain management should include both nonpharmacologic and pharmacologic interventions.   Goal: Pain level will decrease  Description: Pain level will decrease  Outcome: Ongoing  Goal: Recognizes and communicates pain  Description: Recognizes and communicates pain  Outcome: Ongoing  Goal: Control of acute pain  Description: Control of acute pain  Outcome: Ongoing  Goal: Control of chronic pain  Description: Control of chronic pain  Outcome: Ongoing     Problem: Serum Glucose Level - Abnormal:  Goal: Ability to maintain appropriate glucose levels will improve to within specified parameters  Description: Ability to maintain appropriate glucose levels will improve to within specified parameters  Outcome: Ongoing     Problem: Skin Integrity - Impaired:  Goal: Will show no infection signs and symptoms  Description: Will show no infection signs and symptoms  Outcome: Ongoing  Goal: Absence of new skin breakdown  Description: Absence of new skin breakdown  Outcome: Ongoing     Problem: Sleep Pattern Disturbance:  Goal: Appears well-rested  Description: Appears well-rested  Outcome: Ongoing     Problem: Tissue Perfusion, Altered:  Goal: Circulatory function within specified parameters  Description: Circulatory function within specified parameters  Outcome: Ongoing     Problem: Tissue Perfusion - Cardiopulmonary, Altered:  Goal: Absence of angina  Description: Absence of angina  Outcome: Ongoing  Goal: Hemodynamic stability will improve  Description: Hemodynamic stability will improve  Outcome: Ongoing

## 2021-01-05 NOTE — PROGRESS NOTES
01/05/21 0505   Vent Information   Vent Type 840   Vent Mode AC/VC   Vt Ordered 500 mL   Rate Set 14 bmp   Peak Flow 60 L/min   Pressure Support 0 cmH20   FiO2  100 %   SpO2 90 %   SpO2/FiO2 ratio 90   Sensitivity 3   PEEP/CPAP 5   I Time/ I Time % 0 s   Humidification Source HME   Vent Patient Data   High Peep/I Pressure 0   Peak Inspiratory Pressure 18 cmH2O   Mean Airway Pressure 11 cmH20   Rate Measured 21 br/min   Vt Exhaled 446 mL   Minute Volume 10.9 Liters   I:E Ratio 1:2.9   Cough/Sputum   Sputum How Obtained Endotracheal;Suctioned   $Obtained Sample $Induced Sputum   Cough Productive   Sputum Amount Small   Sputum Color Clear   Tenacity Thin   Spontaneous Breathing Trial (SBT) RT Doc   Pulse 105   Additional Respiratory  Assessments   Resp 20   Alarm Settings   High Pressure Alarm 40 cmH2O   Delay Alarm 20 sec(s)   Low Minute Volume Alarm 2.5 L/min   Apnea (secs) 20 secs   High Respiratory Rate 40 br/min   Low Exhaled Vt  250 mL   ETT (adult)   Placement Date/Time: 01/04/21 (c) 2030   Preoxygenation: Yes  Mask Ventilation: Ventilated by mask (1)  Tube Size: 7.5 mm  Laryngoscope: Mac  Blade Size: 3  Location: Oral  Insertion attempts: 1  Placement Verified By[de-identified] Auscultation;Capnometry; Chest X...    Secured at 21 cm   Measured From Lips   ET Placement Left   Secured By Commercial tube daly   Site Condition Dry   Cuff Pressure   (Minimal leak)

## 2021-01-05 NOTE — ANESTHESIA PRE PROCEDURE
Department of Anesthesiology  Preprocedure Note       Name:  Leo Cervantes   Age:  77 y.o.  :  1945                                          MRN:  [id number]         Date:  2021      Surgeon: Brooke Madrigal):  Abhay Benitez MD    Procedure: Procedure(s):  LAPAROTOMY EXPLORATORY    Medications prior to admission:   Prior to Admission medications    Medication Sig Start Date End Date Taking? Authorizing Provider   ondansetron (ZOFRAN ODT) 4 MG disintegrating tablet Take 1 tablet by mouth every 6 hours 20   Star Marshall PA-C   famotidine (PEPCID) 40 MG tablet Take 40 mg by mouth daily    Historical Provider, MD   methotrexate (RHEUMATREX) 2.5 MG chemo tablet Take 10 mg by mouth once a week 2020 Patient takes on  due today. Takes with lunch meal    Historical Provider, MD   aspirin 81 MG tablet Take 81 mg by mouth 2 times daily     Historical Provider, MD   Flaxseed, Linseed, (FLAXSEED OIL) 1200 MG CAPS Take 2,400 mg by mouth daily    Historical Provider, MD   lisinopril-hydrochlorothiazide (PRINZIDE;ZESTORETIC) 20-12.5 MG per tablet Take 1 tablet by mouth daily 17   Adrien Anderson PA-C   atorvastatin (LIPITOR) 10 MG tablet Take 1 tablet by mouth nightly 17   Adrien Anderson PA-C   vitamin D (CHOLECALCIFEROL) 1000 UNIT TABS tablet Take 1,000 Units by mouth 2 times daily    Historical Provider, MD   zoledronic acid (RECLAST) 5 MG/100ML SOLN Infuse 100 mLs intravenously once for 1 dose To be set up through infusion unit at [facility]. Orders for infusion per protocol  Patient taking differently: Infuse 5 mg intravenously once To be set up through infusion unit at [facility]. Orders for infusion per protocol, was scheduled for today 2020  Candida Anderson PA-C   minocycline (MINOCIN;DYNACIN) 50 MG capsule Take 50 mg by mouth daily    Historical Provider, MD [employer] (GLUCOSAMINE CHOND COMPLEX/MSM PO) Take 1 tablet by mouth daily.     Historical Provider, MD   b complex vitamins capsule Take 1 capsule by mouth daily. Historical Provider, MD   magnesium gluconate (MAGONATE) 500 MG tablet Take 500 mg by mouth daily. Historical Provider, MD   Omega-3 Fatty Acids 1200 MG CAPS Take 1 tablet by mouth daily. Historical Provider, MD   Calcium Carbonate-Vit D-Min 6773-3850 MG-UNIT CHEW Take 1 tablet by mouth daily. Historical Provider, MD   hydroxychloroquine (PLAQUENIL) 200 MG tablet Take 200 mg by mouth 2 times daily. Historical Provider, MD       Current medications:    No current facility-administered medications for this visit.       Current Outpatient Medications   Medication Sig Dispense Refill    ondansetron (ZOFRAN ODT) 4 MG disintegrating tablet Take 1 tablet by mouth every 6 hours 20 tablet 0     Facility-Administered Medications Ordered in Other Visits   Medication Dose Route Frequency Provider Last Rate Last Admin    rocuronium (Thena Rashad) injection    PRN Robbin Cadet MD   50 mg at 01/05/21 0132    EPINEPHrine (EPINEPHrine HCL) 5 mg in dextrose 5 % 250 mL infusion    Continuous PRN Robbin Cadet MD 90 mL/hr at 01/05/21 0138 30 mcg/min at 01/05/21 0138    vasopressin 20 Units in dextrose 5 % 100 mL infusion    Continuous PRN Robbin Cadet MD 15 mL/hr at 01/05/21 0138 0.05 Units/min at 01/05/21 0138    0.9 % sodium chloride infusion    Continuous PRN Robbin Cadet MD   New Bag at 01/05/21 0136    norepinephrine (LEVOPHED) injection    PRN Robbin Cadet MD   20 mcg at 01/05/21 0204    famotidine (PEPCID) injection 20 mg  20 mg Intravenous BID JORDI Mas CNP        fentanyl (SUBLIMAZE) 1,000 mcg in sodium chloride 0.9% 100 mL infusion  25 mcg/hr Intravenous Continuous JORDI Mas CNP        dexmedetomidine (PRECEDEX) 400 mcg in sodium chloride 0.9 % 100 mL infusion  0.2 mcg/kg/hr Intravenous Continuous JORDI Mas CNP        0.9 % sodium chloride infusion   Intravenous Continuous Brendan Barthel, APRN -  mL/hr at 01/04/21 2150 New Bag at 01/04/21 2150    promethazine (PHENERGAN) tablet 12.5 mg  12.5 mg Oral Q6H PRN Adia JORDI Harding - CNP        Or    ondansetron (ZOFRAN) injection 4 mg  4 mg Intravenous Q6H PRN Adia Mehdi, APRN - CNP        chlorhexidine (PERIDEX) 0.12 % solution 15 mL  15 mL Mouth/Throat BID Adia VIK HardingN - CNP        norepinephrine (LEVOPHED) 16 mg in dextrose 5% 250 mL infusion  5 mcg/min Intravenous Continuous Adia Mehdi APRN - CNP 28.1 mL/hr at 01/05/21 0106 30 mcg/min at 01/05/21 0106    EPINEPHrine (EPINEPHrine HCL) 5 mg in dextrose 5 % 250 mL infusion  1 mcg/min Intravenous Continuous Adia Mehdi, APRN - CNP 72 mL/hr at 01/05/21 0023 24 mcg/min at 01/05/21 0023    piperacillin-tazobactam (ZOSYN) 3.375 g in dextrose 5 % 50 mL IVPB (mini-bag)  3.375 g Intravenous Q6H Adia Mehdi APRN - CNP        vancomycin (VANCOCIN) 1,250 mg in dextrose 5 % 250 mL IVPB  1,250 mg Intravenous Q18H Adia Mehdi, APRN - CNP        sodium bicarbonate 100 mEq in dextrose 5 % 1,000 mL infusion   Intravenous Continuous JORDI Mas -  mL/hr at 01/05/21 0154 200 mL/hr at 01/05/21 0154    vasopressin 20 Units in dextrose 5 % 100 mL infusion  0.04 Units/min Intravenous Continuous Nataly Jeff MD 12 mL/hr at 01/05/21 0015 0.04 Units/min at 01/05/21 0015    calcium carbonate (TUMS) chewable tablet 500 mg  500 mg Oral TID PRN Inocente Garnett MD   500 mg at 01/04/21 1615    polyethylene glycol (GLYCOLAX) packet 17 g  17 g Oral Daily Cassandra Villagran MD   17 g at 01/04/21 1498    docusate sodium (COLACE) capsule 100 mg  100 mg Oral BID Nataly Jeff MD   100 mg at 01/04/21 9069    influenza quadrivalent split vaccine (FLUZONE;FLUARIX;FLULAVAL;AFLURIA) injection 0.5 mL  0.5 mL Intramuscular Prior to discharge Inocente Garnett MD        sodium chloride (OCEAN, BABY AYR) 0.65 % nasal spray 1 spray  1 spray Each Nostril PRN Anatoly Johnson MD        aspirin chewable tablet 81 mg  81 mg Oral BID Anatoly Johnson MD   81 mg at 01/04/21 0921    oxyCODONE-acetaminophen (PERCOCET) 5-325 MG per tablet 1 tablet  1 tablet Oral Q4H PRN Anatoly Johnson MD   1 tablet at 01/04/21 1603    morphine sulfate (PF) injection 4 mg  4 mg Intravenous Q2H PRN Anatoly Johnson MD   4 mg at 01/03/21 2028    sodium chloride flush 0.9 % injection 10 mL  10 mL Intravenous BID Anatoly Johnson MD   10 mL at 01/05/21 0027    atorvastatin (LIPITOR) tablet 10 mg  10 mg Oral Nightly Anatoly Johnson MD   10 mg at 01/03/21 2029    calcium-vitamin D ([place]) 500-200 MG-UNIT per tablet 2 tablet  2 tablet Oral Daily Anatoly Johnson MD   2 tablet at 01/04/21 7406    hydroxychloroquine (PLAQUENIL) tablet 200 mg  200 mg Oral BID Anatoly Johnson MD   200 mg at 01/04/21 5475    lisinopril-hydroCHLOROthiazide (PRINZIDE;ZESTORETIC) 20-12.5 MG per tablet 1 tablet  1 tablet Oral Daily Anatoly Johnson MD   1 tablet at 01/04/21 0920    [Held by provider] methotrexate (RHEUMATREX) chemo tablet 10 mg  10 mg Oral Weekly Anatoly Johnson MD        omega-3 acid ethyl esters (LOVAZA) capsule 1,000 mg  1,000 mg Oral Daily Anatoly Johnson MD   1,000 mg at 01/04/21 5143    vitamin D CAPS 1,000 Units  1,000 Units Oral BID Anatoly Johnson MD   1,000 Units at 01/04/21 1251    sodium chloride flush 0.9 % injection 10 mL  10 mL Intravenous 2 times per day Anatoly Johnson MD   10 mL at 01/05/21 0027    sodium chloride flush 0.9 % injection 10 mL  10 mL Intravenous PRN Anatoly Johnson MD        enoxaparin (LOVENOX) injection 40 mg  40 mg Subcutaneous Daily Anatoly Johnson MD   40 mg at 01/04/21 0584    polyethylene glycol (GLYCOLAX) packet 17 g  17 g Oral Daily PRN Anatoly Johnson MD        acetaminophen (TYLENOL) tablet 650 mg  650 mg Oral Q6H PRN Anatoly Johnson MD        Or    acetaminophen (TYLENOL) suppository 650 mg  650 mg Rectal Q6H PRN Jerilyn Maynard MD        potassium chloride (KLOR-CON M) extended release tablet 40 mEq  40 mEq Oral PRN Jerilyn Maynard MD   40 mEq at 01/01/21 1741    Or    potassium bicarb-citric acid (EFFER-K) effervescent tablet 40 mEq  40 mEq Oral PRN Jerilyn Maynard MD        Or    potassium chloride 10 mEq/100 mL IVPB (Peripheral Line)  10 mEq Intravenous PRN Jerilyn Maynard  mL/hr at 01/01/21 1321 10 mEq at 01/01/21 1321    Autologous Serum 20% OPH SOLN (eye drops, patient own supply)  1 drop Both Eyes 4x Daily Jerilyn Maynard MD   1 drop at 01/04/21 1615       Allergies: Allergies   Allergen Reactions    Omnaris [Ciclesonide]        Problem List:    Patient Active Problem List   Diagnosis Code    Primary osteoarthritis of left knee M17.12    Genu valgum, acquired M25.65    Essential hypertension I10    Sjogren's disease ([facility] [address] M35.00    Vitamin D deficiency E55.9    Environmental allergies Z91.09    Mixed hyperlipidemia E78.2    Osteoporosis M81.0    Abdominal pain R10.9    Poor intravenous access Z78.9       Past Medical History:        Diagnosis Date    Arthritis     Dry eye     GERD (gastroesophageal reflux disease)     Hiatal hernia     Hyperlipidemia     Hypertension     Osteopenia     Osteoporosis     TIA (transient ischemic attack)        Past Surgical History:        Procedure Laterality Date    BACK SURGERY      scoliosis    CARPAL TUNNEL RELEASE      CYST REMOVAL      ESOPHAGEAL DILATATION      HYSTERECTOMY      Total    JOINT REPLACEMENT  09/26/1916    knee L    LAPAROTOMY N/A 12/30/2020    LAPAROTOMY EXPLORATORY WITH DISTAL SIGMOID COLON RESECTION performed by Jerilyn Maynard MD at [facility]       Social History:    Social History     Tobacco Use    Smoking status: Never Smoker    Smokeless tobacco: Never Used   Substance Use Topics    Alcohol use:  No                                Counseling given: Not Answered      Vital Signs (Current): There were no vitals filed for this visit. BP Readings from Last 3 Encounters:   01/05/21 99/73   12/30/20 (!) 134/92   11/15/19 139/64       NPO Status:                                                                                 BMI:   Wt Readings from Last 3 Encounters:   12/29/20 164 lb (74.4 kg)   11/15/19 164 lb (74.4 kg)   11/14/18 165 lb (74.8 kg)     There is no height or weight on file to calculate BMI.    CBC:   Lab Results   Component Value Date    WBC 8.8 01/04/2021    RBC 4.26 01/04/2021    HGB 13.7 01/04/2021    HCT 42.2 01/04/2021    MCV 99.1 01/04/2021    RDW 13.2 01/04/2021     01/04/2021       CMP:   Lab Results   Component Value Date     01/04/2021    K 3.5 01/04/2021     01/04/2021    CO2 21 01/04/2021    BUN 11 01/04/2021    CREATININE 0.8 01/04/2021    GFRAA >60 01/04/2021    AGRATIO 1.5 07/05/2016    LABGLOM >60 01/04/2021    GLUCOSE 177 01/04/2021    PROT 3.6 01/04/2021    CALCIUM 9.7 01/04/2021    BILITOT 1.2 01/04/2021    ALKPHOS 56 01/04/2021     01/04/2021    ALT 33 01/04/2021       POC Tests: No results for input(s): POCGLU, POCNA, POCK, POCCL, POCBUN, POCHEMO, POCHCT in the last 72 hours.     Coags:   Lab Results   Component Value Date    PROTIME 16.3 01/04/2021    INR 1.34 01/04/2021    APTT 23.7 01/04/2021       HCG (If Applicable): No results found for: PREGTESTUR, PREGSERUM, HCG, HCGQUANT     ABGs: No results found for: PHART, PO2ART, PHJ2XNG, OVM1JAK, BEART, T1VQFVZM     Type & Screen (If Applicable):  No results found for: LABABO, LABRH    Drug/Infectious Status (If Applicable):  No results found for: HIV, HEPCAB    COVID-19 Screening (If Applicable):   Lab Results   Component Value Date    COVID19 NOT DETECTED 12/29/2020         Anesthesia Evaluation  Patient summary reviewed and Nursing notes reviewed  Airway: Mallampati: II  TM distance: >3 FB   Neck ROM: full  Mouth opening: > = 3 FB Dental:    (+) edentulous      Pulmonary:normal exam    (+) rhonchi,                             Cardiovascular:  Exercise tolerance: poor (<4 METS),   (+) hypertension:, hyperlipidemia      ECG reviewed  Rhythm: regular  Rate: abnormal  Echocardiogram reviewed               ROS comment: Sinus rhythm with occasional premature ventricular complexes   Possible Anterior infarct , age undetermined   Abnormal ECG   No previous ECGs available   Confirmed by Una Grace ([id number]) on 12/29/2020 5:34:02 PM     IMPRESSION:  1. Technically difficult study. 2.  Mild left ventricular hypertrophy noted. 3.  Ejection fraction is greater than 55%. 4.  Mild left atrial enlargement noted. 5.  Grade 1 diastolic dysfunction present. 6.  Mild mitral and tricuspid regurgitation is present. Jennifer Roa MD     [id number]  DD: 06/17/2014 13:04   DT: 06/17/2014 13:43   Job #: [id number]  CC: Jennifer Roa MD  CC: Terri Stroud MD    Display only: Transcription ([id number]) on 6/17/2014  1:03 PM by Naveed Wu MD  Echocardiogram complete 2D with doppler with color  Study Date: 06/17/2014  Katiuska Diego 80 y.o. Ordering Provider Bert Tang,     ================    Rapid response and CPR x 4 rounds this evening -now on maxed Norepi/epi + vasopressin 0.04 mcg/min     Neuro/Psych:   (+) TIA,             GI/Hepatic/Renal:   (+) hiatal hernia, GERD:,           Endo/Other:    (+) : arthritis:., .          Pt had no PAT visit        ROS comment: sjogrens Abdominal:           Vascular:                                          Anesthesia Plan      general     ASA 5 - emergent     (Covid -  )  Induction: inhalational and intravenous. arterial line and central line    Anesthetic plan and risks discussed with Unable to obtain due to emergent nature.                       Lakisha Toussaint MD   1/5/2021

## 2021-01-05 NOTE — SIGNIFICANT EVENT
Patient returned from exploratory laparotomy. Patient underwent washout with resection of anastomosis and colostomy placement. CHERISE drain in place with serosanguineous drainage. Ostomy clean. Patient noted to have map of 87, tachycardia at 103, and saturating 98%. Patient not breathing over vent. Noted to have good Plath on monitor. Abdomen slight dressed and clean.

## 2021-01-05 NOTE — PROGRESS NOTES
GENERAL SURGERY PROGRESS NOTE    Macy Pope is a 77 y.o. female with lower abdominal pain and leukocytosis. S/p sigmoid colectomy on 12/30. Taken back to the OR early this morning for washout and colostomy after anastomotic leak                Subjective: Intubated and sedated on pressors this morning. Possibly some seizure like activity per nursing. Febrile.     Objective:    Vitals: VITALS:  /65   Pulse 121   Temp 102.4 °F (39.1 °C) (Rectal)   Resp 22   Ht 4' 11\" (1.499 m)   Wt 164 lb (74.4 kg)   SpO2 98%   BMI 33.12 kg/m²     I/O: 01/04 0701 - 01/05 0700  In: 4351 [I.V.:4051]  Out: 1110 [Urine:250; Drains:150]    Labs/Imaging Results:   Lab Results   Component Value Date     01/05/2021    K 3.5 01/05/2021    CL 96 01/05/2021    CO2 21 01/05/2021    BUN 15 01/05/2021    CREATININE 1.0 01/05/2021    GLUCOSE 295 01/05/2021    CALCIUM 8.3 01/05/2021      Lab Results   Component Value Date    WBC 3.8 (L) 01/05/2021    HGB 14.5 01/05/2021    HCT 45.0 01/05/2021    MCV 99.1 01/05/2021     01/05/2021       IV Fluids: fentanyl    propofol    phenylephrine (CATHERINE-SYNEPHRINE) 50mg/250mL infusion Last Rate: 150 mcg/min (01/05/21 0922)    sodium bicarbonate infusion Last Rate: 150 mL/hr at 01/05/21 0830    amiodarone **FOLLOWED BY** amiodarone **FOLLOWED BY** amiodarone    dexmedetomidine (PRECEDEX) IV infusion    sodium chloride Last Rate: 100 mL/hr at 01/04/21 2150    norepinephrine Last Rate: 24 mcg/min (01/05/21 0248)    EPINEPHrine Last Rate: 30 mcg/min (01/05/21 0739)    vasopressin (Septic Shock) infusion Last Rate: 0.04 Units/min (01/05/21 0711)    Scheduled Meds:   [START ON 1/8/2021] hydroxychloroquine, 200 mg, Oral, BID    albuterol sulfate HFA, 4 puff, Inhalation, Q4H **AND** ipratropium, 4 puff, Inhalation, Q4H **AND** MDI Treatment, , , Q4H    famotidine (PEPCID) injection, 20 mg, Intravenous, BID    sodium chloride, 1,000 mL, Intravenous, Once    amiodarone, 150 mg, Intravenous, Once **FOLLOWED BY** amiodarone, 1 mg/min, Intravenous, Continuous **FOLLOWED BY** amiodarone, 0.5 mg/min, Intravenous, Continuous    albumin human, 25 g, Intravenous, Once    chlorhexidine, 15 mL, Mouth/Throat, BID    piperacillin-tazobactam, 3.375 g, Intravenous, Q6H    vancomycin, 1,250 mg, Intravenous, Q18H    polyethylene glycol, 17 g, Oral, Daily    influenza virus vaccine, 0.5 mL, Intramuscular, Prior to discharge    sodium chloride flush, 10 mL, Intravenous, BID    lisinopril-hydroCHLOROthiazide, 1 tablet, Oral, Daily    [Held by provider] methotrexate, 10 mg, Oral, Weekly    omega-3 acid ethyl esters, 1,000 mg, Oral, Daily    sodium chloride flush, 10 mL, Intravenous, 2 times per day    enoxaparin, 40 mg, Subcutaneous, Daily    Autologous Serum 20% OPH SOLN (eye drops, patient own supply), 1 drop, Both Eyes, 4x Daily    Physical Exam:  General: intubated and sedated  HEENT: Anicteric sclerae, MMM. Extremities: atraumatic  Abdomen: Soft, dressing clean and intact    CHERISE- 150cc serosanguinous      Assessment and Plan:  77 y.o. female with abdominal pain an leukocytosis. POD 6 from ex lap with sigmoid resection and primary anastomosis. POD 0 from take back to the OR with washout and colostomy. Hemodynamically unstable on pressors. Making urine. CVP low and Hgb up, likely due to hemoconcentration.     Patient Active Problem List:     Primary osteoarthritis of left knee     Genu valgum, acquired     Essential hypertension     Sjogren's disease ([facility] [address]     Vitamin D deficiency     Environmental allergies     Mixed hyperlipidemia     Osteoporosis     Abdominal pain      - continue supportive cares  - daily dressing changes to midline wound with wet to dry kerlex, possible VAC placement in a couple of days  - will bolus NS/albumin this morning given low CVP, pressor requirement and labs    Anatoly Johnson MD

## 2021-01-05 NOTE — PROGRESS NOTES
Pt having irregularities on HR. Pulse checked showed pt lilibeth down to 30, then back up 70. Poor perfusion per ART line. Difficulty palpating a pulse with pauses. RRT called. Atropine given. Pt now ST at 115.

## 2021-01-05 NOTE — CONSULTS
Neurology Service Consult Note  [facility]  Patient Name: Moncho Sow  : 1945        Subjective:   Reason for consult: cardiac arrest   77 y.o. right-handed female with PMH listed below presenting to [facility] with complaints of ABD pain, patient found to have large bowel obstruction, surgery with complications one day prior to this exam, and then around  one day prior to this exam patient suffered from spontaneous cardiac arrest, 4 rounds of CPR were completed, CT head completed and showed no catastrophic hypoxic injury, then around 6590-7875 patient showed jerking movement, possible seizure, she was given 2mg of Ativan, no further movement. Prior to arrival patient had chronic health co-morbidities such as HTN, HLD but had never suffered from previous cardiac arrest, CVA, or seizure. She did not suffer from dementia. She had hx of Sjogren's disease. Patient did not back to surgery overnight for colostomy placement and colon wash-out, she has open abd wound in place. Patient family at the bedside during my second rounds with Dr. Jeff Sanders, they are in shock and confused about the entire chain of events, we discussed in length and in detail what happens in the state of cardiac arrest in relationship to hypoxia and anoxia of the brain and how this now affects the rest of her metabolic complications. No further seizure activity.        Past Medical History:   Diagnosis Date    Arthritis     Dry eye     GERD (gastroesophageal reflux disease)     Hiatal hernia     Hx of Sjogren's disease ([facility] [address]     Hyperlipidemia     Hypertension     Osteopenia     Osteoporosis     TIA (transient ischemic attack)     :   Past Surgical History:   Procedure Laterality Date    BACK SURGERY      scoliosis    CARPAL TUNNEL RELEASE      COLECTOMY  2021    COLOSTOMY Left 2021    CYST REMOVAL      DILATATION, ESOPHAGUS      ESOPHAGEAL DILATATION      HYSTERECTOMY      Total    JOINT REPLACEMENT 09/26/1916    knee L    LAPAROTOMY N/A 12/30/2020    LAPAROTOMY EXPLORATORY WITH DISTAL SIGMOID COLON RESECTION performed by El Dee MD at [facility] OR     Medications:  Scheduled Meds:   [START ON 1/8/2021] hydroxychloroquine  200 mg Oral BID    albuterol sulfate HFA  4 puff Inhalation Q4H    And    ipratropium  4 puff Inhalation Q4H    famotidine (PEPCID) injection  20 mg Intravenous BID    chlorhexidine  15 mL Mouth/Throat BID    piperacillin-tazobactam  3.375 g Intravenous Q6H    vancomycin  1,250 mg Intravenous Q18H    polyethylene glycol  17 g Oral Daily    influenza virus vaccine  0.5 mL Intramuscular Prior to discharge    sodium chloride flush  10 mL Intravenous BID    lisinopril-hydroCHLOROthiazide  1 tablet Oral Daily    [Held by provider] methotrexate  10 mg Oral Weekly    omega-3 acid ethyl esters  1,000 mg Oral Daily    sodium chloride flush  10 mL Intravenous 2 times per day    enoxaparin  40 mg Subcutaneous Daily    Autologous Serum 20% OPH SOLN (eye drops, patient own supply)  1 drop Both Eyes 4x Daily     Continuous Infusions:   fentanyl      propofol      phenylephrine (CATHERINE-SYNEPHRINE) 50mg/250mL infusion 150 mcg/min (01/05/21 0922)    sodium bicarbonate infusion 150 mL/hr at 01/05/21 0830    amiodarone 1 mg/min (01/05/21 1033)    Followed by   [facility] amiodarone      prismaSATE BGK 4/0/1.2      prismaSATE BGK 4/2.5      dialysis builder      dexmedetomidine (PRECEDEX) IV infusion      sodium chloride 100 mL/hr at 01/04/21 2150    norepinephrine 24 mcg/min (01/05/21 0248)    EPINEPHrine 30 mcg/min (01/05/21 1040)    vasopressin (Septic Shock) infusion 0.04 Units/min (01/05/21 0711)     PRN Meds:. HYDROmorphone, sodium chloride, potassium chloride, magnesium sulfate, calcium gluconate **OR** calcium gluconate **OR** calcium gluconate **OR** calcium gluconate, sodium phosphate IVPB **OR** sodium phosphate IVPB **OR** sodium phosphate IVPB **OR** sodium phosphate IVPB, promethazine **OR** ondansetron, calcium carbonate, sodium chloride, oxyCODONE-acetaminophen, morphine, sodium chloride flush, polyethylene glycol, acetaminophen **OR** acetaminophen, potassium chloride **OR** potassium alternative oral replacement **OR** potassium chloride    Allergies   Allergen Reactions    Omnaris [Ciclesonide]      Social History     Socioeconomic History    Marital status:      Spouse name: Not on file    Number of children: Not on file    Years of education: Not on file    Highest education level: Not on file   Occupational History    Occupation: housewife   Social Needs    Financial resource strain: Not on file    Food insecurity     Worry: Not on file     Inability: Not on file   [employer] needs     Medical: Not on file     Non-medical: Not on file   Tobacco Use    Smoking status: Never Smoker    Smokeless tobacco: Never Used   Substance and Sexual Activity    Alcohol use: No    Drug use: No    Sexual activity: Not on file   Lifestyle    Physical activity     Days per week: Not on file     Minutes per session: Not on file    Stress: Not on file   Relationships    Social connections     Talks on phone: Not on file     Gets together: Not on file     Attends [employer] service: Not on file     Active member of club or organization: Not on file     Attends meetings of clubs or organizations: Not on file     Relationship status: Not on file    Intimate partner violence     Fear of current or ex partner: Not on file     Emotionally abused: Not on file     Physically abused: Not on file     Forced sexual activity: Not on file   Other Topics Concern    Not on file   Social History Narrative    Not on file      Family History   Problem Relation Age of Onset    Stroke Father        Review of Symptoms:    Patient cannot answer ROS due to current encephalopathic state    Physical Exam:         Gen: comatose  HEENT: NC/AT, EOMI, PERRL, mmm, neck supple, no meningeal signs; Heart: st  Lungs: vent   Ext: no edema, no calf tenderness b/l  Psych: obtunded on the vent, off sedation   Skin: open abd wound     NEUROLOGIC EXAM:    Mental Status: comatose    Cranial Nerve Exam:   CN II-XII: pupils are dilated, they are reactive bilaterally, she has no corneal reflexes b/l, no blink, oculocephalic's are intact bilaterally, she is breathing over the vent, she has no gag or cough reflex     Motor Exam/Sensation/Reflexes:  No withdrawal/grimace to pain x4  No reflexes present  No babinski BLE      Coordination/Cerebellum:       Tremors--myoclonic jerking of the bilateral eye lids and face    Gait and stance:      Gait: deferred      LABS:     Recent Labs     01/04/21  0639 01/04/21  2235 01/05/21  0445   WBC 5.2 8.8 3.8*    140 130*   K 3.6 3.5 3.5    101 96*   CO2 29 21 21   BUN 5* 11 15   CREATININE 0.6 0.8 1.0   GLUCOSE 90 177* 295*   INR  --  1.34  --            IMAGING:    CT head nonacute  EEG pending      ASSESSMENT/PLAN:     3 71-year-old female with unresponsive behavior status post cardiac arrest superimposed on large bowel obstruction with sigmoid resection and primary anastomosis, and kidney infarction, question of seizure activity. TME with possible ischemic anoxic encephalopathy. Plan of care as follows:  1. Neuro Exam:  1. Obtunded, pupils intact b/l with oculocephalic movement but no corneal, no cortical function  2. Neurodiagnostics:  1. CT head non acute  2. EEG pending   3. Medications:  1. Hold AED until after EEG  4. PT/OT/ST:  1. Per their recommendations   5. Follow up:  1. Pending EEG and course of admission          Thank you for allowing us to participate in the care of your patient. If there are any questions regarding evaluation please feel free to contact us. JORDI Osei CNP, 1/5/2021    Attending Note:  I have rounded on this patient with Amandeep Castro CNP. I have reviewed the chart and we have discussed this case in detail.  The patient was seen and examined by myself. Pertinent labs and imaging have been personally reviewed. Our findings and impressions were discussed with the patient. I concur with the Nurse Practioner's assessment and plan. Discussed with family at bedside.      Adolph Adorno DO 1/8/2021 2:25 PM

## 2021-01-05 NOTE — PROGRESS NOTES
CT abd/pelvis reviewed. Free air and fluid is noted in the abdomen. CT chest also with ET tube in the right mainstem.     - to OR for washout and colostomy  - will back out ET tube 2cm    Electronically signed by Orly Dawson MD on 1/4/2021 at 11:53 PM

## 2021-01-05 NOTE — PROGRESS NOTES
Pt noted to be having seizure like activity, small upper body jerks every few seconds.  Adia NP at bedside, 2mg ativan ordered and given

## 2021-01-05 NOTE — SIGNIFICANT EVENT
Significant event documentation    Time of event: 2040 1/4/2021    Event: Cardiopulmonary arrest    Code note:  Upon transfer to ICU patient was undergoing procedure to place art line when noted to have weak pulse that was immediately lost.  Code was started at 2043. Patient underwent approximately 4 rounds of compressions. Patient was immediately given epinephrine. Patient was then noted to go into V. fib and was immediately defibrillated. Patient was given a total of 2 defibrillations and rhythm changed from V. fib to PEA. In total patient received 2 doses of epinephrine, 1 amp of sodium bicarbonate, 2 defibrillations, 300 mg amiodarone, 2 g mag sulfate, 1 g calcium chloride, and 1 amp D50. Patient obtained ROSC. Post code:  Patient hooked up to NG tube and noted to have greater than 500 cc bile return immediately. Additionally added CTA abdomen pelvis with contrast which is pending. Broad-spectrum antibiotics were initiated. Concern for aspiration given above. Lactic acid, ABG, BMP, wheezing, phosphorus, and ionized calcium was ordered. Had a lengthy discussion with family regarding patient's condition, overall poor prognosis, and plan for treatment. Patient's family wanted patient to remain full code. Discussed case in person with general surgeon Dr. Bryant Mccoy. Due to the immediate potential for life-threatening deterioration due to respiratory arrest leading to circulatory collapse, I spent 90 minutes providing critical care. This time is excluding time spent performing procedures. Electronically signed by Daiana Becerra DO on 1/5/2021 at 2:30 AM    This dictation was created with voice recognition software. While attempts have been made to review the dictation as it is transcribed, on occasion the spoken word can be misinterpreted by the technology leading to omissions or inappropriate words, phrases or sentences.

## 2021-01-05 NOTE — CONSULTS
Department of General Surgery   Surgical Service Dr. Makayla Valdivia   Consult Note    Date of Consult: 1/4/21    Reason for Consult:  CVC placement    Requesting Physician:  Hospitalist    CHIEF COMPLAINT:  Need for CVC    History Obtained From:  electronic medical record, reason patient could not give history:  on ventilator    HISTORY OF PRESENT ILLNESS:      The patient is a 77 y.o. female who presented with need for IV access, CVC. Location: n/a  Quality: n/a  Severity: n/a  Duration: acute onset  Timing: acute  Context: just had rapid and code blue s/p cardiac arrest  Modifying factors: n/a  Associated signs and symptoms: n/a    Stat c/s was placed for CVC placement.        Past Medical History:    Past Medical History:   Diagnosis Date    Arthritis     Dry eye     GERD (gastroesophageal reflux disease)     Hiatal hernia     Hyperlipidemia     Hypertension     Osteopenia     Osteoporosis     TIA (transient ischemic attack)        Past Surgical History:    Past Surgical History:   Procedure Laterality Date    BACK SURGERY      scoliosis    CARPAL TUNNEL RELEASE      CYST REMOVAL      ESOPHAGEAL DILATATION      HYSTERECTOMY      Total    JOINT REPLACEMENT  09/26/1916    knee L    LAPAROTOMY N/A 12/30/2020    LAPAROTOMY EXPLORATORY WITH DISTAL SIGMOID COLON RESECTION performed by Vesna Bailey MD at [facility] OR       Current Medications:   Current Facility-Administered Medications   Medication Dose Route Frequency Provider Last Rate Last Admin    famotidine (PEPCID) injection 20 mg  20 mg Intravenous BID Adia Mehdi, APRN - CNP        fentanyl (SUBLIMAZE) 1,000 mcg in sodium chloride 0.9% 100 mL infusion  25 mcg/hr Intravenous Continuous Adia Dapilah, APRN - CNP        dexmedetomidine (PRECEDEX) 400 mcg in sodium chloride 0.9 % 100 mL infusion  0.2 mcg/kg/hr Intravenous Continuous Adia Dapilah, APRN - CNP        0.9 % sodium chloride infusion   Intravenous Continuous Adia Dapilah, APRN -  mL/hr at 01/04/21 2150 New Bag at 01/04/21 2150    promethazine (PHENERGAN) tablet 12.5 mg  12.5 mg Oral Q6H PRN Adia JORDI Harding - CNP        Or    ondansetron (ZOFRAN) injection 4 mg  4 mg Intravenous Q6H PRN Adia Chantallah, APRN - CNP        chlorhexidine (PERIDEX) 0.12 % solution 15 mL  15 mL Mouth/Throat BID Adia Mehdi, APRN - CNP        norepinephrine (LEVOPHED) 16 mg in dextrose 5% 250 mL infusion  5 mcg/min Intravenous Continuous Adia Dapilah, APRN - CNP 9.4 mL/hr at 01/04/21 2141 10 mcg/min at 01/04/21 2141    EPINEPHrine (EPINEPHrine HCL) 5 mg in dextrose 5 % 250 mL infusion  1 mcg/min Intravenous Continuous Adia Mehdi, APRN - CNP        vancomycin (VANCOCIN) 1,500 mg in dextrose 5 % 500 mL IVPB  1,500 mg Intravenous Once Adia Justineh, APRN - CNP        piperacillin-tazobactam (ZOSYN) 3.375 g in dextrose 5 % 50 mL IVPB (mini-bag)  3.375 g Intravenous Q6H Adia Mehdi, APRN - CNP        [START ON 1/5/2021] vancomycin (VANCOCIN) 1,250 mg in dextrose 5 % 250 mL IVPB  1,250 mg Intravenous Q18H Adia Mehdi, APRN - CNP        sodium bicarbonate 100 mEq in dextrose 5 % 1,000 mL infusion   Intravenous Continuous Adia Mehdi, APRN - CNP        calcium carbonate (TUMS) chewable tablet 500 mg  500 mg Oral TID PRN Shari Pendleton MD   500 mg at 01/04/21 1615    polyethylene glycol (GLYCOLAX) packet 17 g  17 g Oral Daily Vishal Peguero MD   17 g at 01/04/21 1957    docusate sodium (COLACE) capsule 100 mg  100 mg Oral BID Johnny Kitchen MD   100 mg at 01/04/21 8559    influenza quadrivalent split vaccine (FLUZONE;FLUARIX;FLULAVAL;AFLURIA) injection 0.5 mL  0.5 mL Intramuscular Prior to discharge Shari Pendleton MD        sodium chloride (OCEAN, BABY AYR) 0.65 % nasal spray 1 spray  1 spray Each Nostril PRN Johnny Kitchen MD        aspirin chewable tablet 81 mg  81 mg Oral BID Johnny Kitchen MD   81 mg at 01/04/21 7488    oxyCODONE-acetaminophen (PERCOCET) 5-325 MG per tablet 1 tablet  1 tablet Oral Q4H PRN Gloria Griffith MD   1 tablet at 01/04/21 1603    morphine sulfate (PF) injection 4 mg  4 mg Intravenous Q2H PRN Gloria Griffith MD   4 mg at 01/03/21 2028    sodium chloride flush 0.9 % injection 10 mL  10 mL Intravenous BID Gloria Griffith MD   Stopped at 01/03/21 2030    atorvastatin (LIPITOR) tablet 10 mg  10 mg Oral Nightly Gloria Griffith MD   10 mg at 01/03/21 2029    calcium-vitamin D (Donna Bravo) 500-200 MG-UNIT per tablet 2 tablet  2 tablet Oral Daily Gloria Griffith MD   2 tablet at 01/04/21 6796    hydroxychloroquine (PLAQUENIL) tablet 200 mg  200 mg Oral BID Gloria Griffith MD   200 mg at 01/04/21 3134    lisinopril-hydroCHLOROthiazide (PRINZIDE;ZESTORETIC) 20-12.5 MG per tablet 1 tablet  1 tablet Oral Daily Gloria Griffith MD   1 tablet at 01/04/21 0920    [Held by provider] methotrexate (RHEUMATREX) chemo tablet 10 mg  10 mg Oral Weekly Gloria Griffith MD        omega-3 acid ethyl esters (LOVAZA) capsule 1,000 mg  1,000 mg Oral Daily Gloria Griffith MD   1,000 mg at 01/04/21 0327    vitamin D CAPS 1,000 Units  1,000 Units Oral BID Gloria Griffith MD   1,000 Units at 01/04/21 1251    sodium chloride flush 0.9 % injection 10 mL  10 mL Intravenous 2 times per day Gloria Griffith MD   Stopped at 01/03/21 2029    sodium chloride flush 0.9 % injection 10 mL  10 mL Intravenous PRN Gloria Griffith MD        enoxaparin (LOVENOX) injection 40 mg  40 mg Subcutaneous Daily Gloria Griffith MD   40 mg at 01/04/21 5153    polyethylene glycol (GLYCOLAX) packet 17 g  17 g Oral Daily PRN Gloria Griffith MD        acetaminophen (TYLENOL) tablet 650 mg  650 mg Oral Q6H PRN MD Charanjit Conner acetaminophen (TYLENOL) suppository 650 mg  650 mg Rectal Q6H PRN Gloria Griffith MD        potassium chloride (KLOR-CON M) extended release tablet 40 mEq 01/04/21 2140 01/04/21 2145 01/04/21 2150   BP:  (!) 95/46  (!) 60/42   Pulse: 99 105 106 104   Resp: 16 19 19 20   Temp:       TempSrc:       SpO2: 95% 90% (!) 88% (!) 87%   Weight:       Height:           Physical Exam  Vitals signs reviewed. Constitutional:       Appearance: She is ill-appearing. She is not diaphoretic. HENT:      Head: Normocephalic and atraumatic. Nose: Nose normal.   Eyes:      General:         Right eye: No discharge. Left eye: No discharge. Neck:      Musculoskeletal: Normal range of motion. Cardiovascular:      Rate and Rhythm: Tachycardia present. Pulmonary:      Comments: Intubated    Abdominal:      Tenderness: There is no abdominal tenderness. Musculoskeletal:         General: No swelling. Skin:     General: Skin is warm. Neurological:      General: No focal deficit present.            DATA:    Reviewed labs and images pertinent for CVC placement    IMPRESSION:        Patient Active Problem List:     Primary osteoarthritis of left knee     Genu valgum, acquired     Essential hypertension     Sjogren's disease ([facility] [address]     Vitamin D deficiency     Environmental allergies     Mixed hyperlipidemia     Osteoporosis     Abdominal pain      78 y/o F with need for CVC placement s/p cardiac arrest     PLAN:    -Will place u/s guided CVC  -Refer to procedure note          Rudolph Mooney II, MD

## 2021-01-05 NOTE — SIGNIFICANT EVENT
Significant event documentation    Time of event: 0 544 on 1/5/2021    Event: Rapid response was called patient was having irregularities of her heart rate ranging from 30s to 70s. Art line was noting poor perfusion. Atropine was given prior to arrival.  Patient was noted on arrival to have sinus tach at 115. Art line was reset and manipulated. Art line now showing moderately good perfusion but MAP still less than 65. Patient noted to have elevated lactic and was febrile, this was anticipated given clinical course. Blood cultures was drawn and we will continue current broad-spectrum antibiotics. We will finish bicarb drip and run at 150 mL an hour. We will go ahead and add 1 L fluid bolus after bicarb is done. The team to determine need for additional bicarb drip or pushes based on repeat ABG. Patient currently acidotic at 7.19 which was drawn when only 300 cc of bicarb drip was done. Given current map we will go ahead and add phenylephrine. Cardiology has been consulted and we will follow their recommendations in regards to pressor use. Overall prognosis is poor. Electronically signed by Caro Garay DO on 1/5/2021 at 6:00 AM    This dictation was created with voice recognition software. While attempts have been made to review the dictation as it is transcribed, on occasion the spoken word can be misinterpreted by the technology leading to omissions or inappropriate words, phrases or sentences.

## 2021-01-05 NOTE — PROGRESS NOTES
RT at bedside to reposition ETT tube. When cuff deflated pt noted to have bile looking emesis, and gurgling.  Orally suctioned and notified Dr. Margot Gu

## 2021-01-05 NOTE — CONSULTS
lisinopril, hydrochlorothiazide and methotrexate. PHYSICAL EXAMINATION:  GENERAL:  The patient is on vent, sedated, on pressors. VITAL SIGNS:  Temperature is afebrile. She had regular temperature  yesterday 100.8, pulse is in 120s, blood pressure in 90s. LUNGS:  Decreased breath sounds present, but clear to auscultation. HEART:  Tachycardiac. ABDOMEN:  Open and she has colostomy present. EXTREMITIES:  She has distal pulses present. LABORATORY DATA:  Sodium is 130, BUN 15 and creatinine 1.0. LFTs are  within normal range. CBC is within normal range. IMPRESSION:  This is a 71-year-old female patient who was admitted to  the hospital with having abdominal pain, admitted to OR and had partial  bowel resection and then she was doing okay and then yesterday she coded  and she went back to OR where she had free air in the abdomen and  colostomy was placed and part of the bowel was resected. Now the patient is on multiple pressors present. I will obtain an echo from cardiac stand to evaluate the LV function and  continue with supportive care. Overall prognosis is guarded. We will  make further recommendations based on hospital course. I will follow  the patient along with you.         Rupali Mcintosh MD    D: 01/05/2021 8:08:36       T: 01/05/2021 11:11:31     [id number]  Job#: [id number]     Doc#: [id number]    CC:

## 2021-01-05 NOTE — CONSULTS
Subjective:   CHIEF COMPLAINT / HPI:  77year old female in hospital for past few days. . she coded tonite. She was taken to OR and had reexploratomy laparotomy done. She is intubated and on vent.       Past Medical History:  Past Medical History:   Diagnosis Date    Arthritis     Dry eye     GERD (gastroesophageal reflux disease)     Hiatal hernia     Hyperlipidemia     Hypertension     Osteopenia     Osteoporosis     TIA (transient ischemic attack)        Past Surgical History:        Procedure Laterality Date    BACK SURGERY      scoliosis    CARPAL TUNNEL RELEASE      COLECTOMY  01/05/2021    COLOSTOMY Left 01/05/2021    CYST REMOVAL      DILATATION, ESOPHAGUS      ESOPHAGEAL DILATATION      HYSTERECTOMY      Total    JOINT REPLACEMENT  09/26/1916    knee L    LAPAROTOMY N/A 12/30/2020    LAPAROTOMY EXPLORATORY WITH DISTAL SIGMOID COLON RESECTION performed by Carly Kay MD at [address] [facility] OR       Current Medications:    Current Facility-Administered Medications: [START ON 1/8/2021] hydroxychloroquine (PLAQUENIL) tablet 200 mg, 200 mg, Oral, BID  HYDROmorphone (DILAUDID) injection 0.5 mg, 0.5 mg, Intravenous, Q4H PRN  famotidine (PEPCID) injection 20 mg, 20 mg, Intravenous, BID  fentanyl (SUBLIMAZE) 1,000 mcg in sodium chloride 0.9% 100 mL infusion, 25 mcg/hr, Intravenous, Continuous  dexmedetomidine (PRECEDEX) 400 mcg in sodium chloride 0.9 % 100 mL infusion, 0.2 mcg/kg/hr, Intravenous, Continuous  0.9 % sodium chloride infusion, , Intravenous, Continuous  promethazine (PHENERGAN) tablet 12.5 mg, 12.5 mg, Oral, Q6H PRN **OR** ondansetron (ZOFRAN) injection 4 mg, 4 mg, Intravenous, Q6H PRN  chlorhexidine (PERIDEX) 0.12 % solution 15 mL, 15 mL, Mouth/Throat, BID  norepinephrine (LEVOPHED) 16 mg in dextrose 5% 250 mL infusion, 5 mcg/min, Intravenous, Continuous  EPINEPHrine (EPINEPHrine HCL) 5 mg in dextrose 5 % 250 mL infusion, 1 mcg/min, Intravenous, Continuous  piperacillin-tazobactam PRN  potassium chloride (KLOR-CON M) extended release tablet 40 mEq, 40 mEq, Oral, PRN **OR** potassium bicarb-citric acid (EFFER-K) effervescent tablet 40 mEq, 40 mEq, Oral, PRN **OR** potassium chloride 10 mEq/100 mL IVPB (Peripheral Line), 10 mEq, Intravenous, PRN  Autologous Serum 20% OPH SOLN (eye drops, patient own supply), 1 drop, Both Eyes, 4x Daily    Allergies   Allergen Reactions    Omnaris [Ciclesonide]        Social History:    Social History     Socioeconomic History    Marital status:      Spouse name: None    Number of children: None    Years of education: None    Highest education level: None   Occupational History    Occupation: housewife   Social Needs    Financial resource strain: None    Food insecurity     Worry: None     Inability: None    Transportation needs     Medical: None     Non-medical: None   Tobacco Use    Smoking status: Never Smoker    Smokeless tobacco: Never Used   Substance and Sexual Activity    Alcohol use: No    Drug use: No    Sexual activity: None   Lifestyle    Physical activity     Days per week: None     Minutes per session: None    Stress: None   Relationships    Social connections     Talks on phone: None     Gets together: None     Attends Orthodox service: None     Active member of club or organization: None     Attends meetings of clubs or organizations: None     Relationship status: None    Intimate partner violence     Fear of current or ex partner: None     Emotionally abused: None     Physically abused: None     Forced sexual activity: None   Other Topics Concern    None   Social History Narrative    None       Family History:   Family History   Problem Relation Age of Onset    Stroke Father        Immunization:  There is no immunization history for the selected administration types on file for this patient.       Objective:   PHYSICAL EXAM:      VITALS:    Vitals:    01/05/21 0325 01/05/21 0330 01/05/21 0345 01/05/21 0400   BP: 121/69  113/71   Pulse:  104 102 102   Resp: 17 14 14 14   Temp:    100 °F (37.8 °C)   TempSrc:    Rectal   SpO2: 95% 94% 93% 93%   Weight:       Height:               NECK:  Supple, symmetrical, trachea midline, no adenopathy, thyroid symmetric, not enlarged and no tenderness  CHEST: Chest expansion equal and symmetrical, no intercostal retraction. LUNGS:  no increased work of breathing, has expiratory wheezes both lungs, no crackles. CARDIOVASCULAR: S1 and S2, no edema and no JVD    LYMPHADENOPATHY:  no axillary or supraclavicular adenopathy. No cervical adnenopathy    MUSCULOSKELETAL: No obvious misalignment or effusion of the joints. No clubbing, cyanosis of the digits. RIGHT AND LEFT LOWER EXTREMITIES: No edema, no inflammation, no tenderness. SKIN:  normal skin color, texture, turgor and no redness, warmth, or swelling. No palpable nodules    DATA:       1. Interval retraction of the endotracheal tube which now lies 1.3 cm above   the pallavi. 2. Left internal jugular central venous catheter tip terminates at the caval   atrial junction. 3. Perihilar opacities suggestive of mild edema. 4. Basilar atelectasis and probable trace effusions.                 abg 7.19/49/84              Assessment:     1. Ac resp failure hypoxic  2. S/p CPR  3. adolfo small pl effusion  4. Bibasilar pulm atelectasis  5. S/p abdominal surgery          Plan:     1. Vent management  2. Vent protocol  3. Bd rx  4. Stress ulcer prophylaxis  5. Pressors for bp maintenance  6. On zosyn and vanc so will continue  7. D/w family  8.  Thanks will follow

## 2021-01-06 NOTE — SIGNIFICANT EVENT
Significant event documentation    Time of event: 0230    Event: Patient status declining and family discussion. Nurse notified Orestes ARTEAGA, who is unfamiliar with the case, forwarded the message immediately to me. Impressions: Reviewed lab work and charts prior to coming into discussed case with family. Patient is becoming hypothermic, SBP's around 60 with MAP at 50 with 4 pressors maxed out and amiodarone running, and sodium level of 118. Prior sodium 125. Patient noted to have elevated glucose of 375 as well. Previous glucose 303. Ionized calcium of 1.05, previous 0.87. ABG with a pH of 7.27, PCO2 of 33, PO2 65, and bicarbonate 15.2. Overall clinical courses worsening with prognosis poor. Concern for cerebral salt wasting given neurologic status. Discussed extensively with family regarding future care. Family would like to discuss amongst themselves at this time about withdrawal of care versus further treatment. I told him I would return at approximately 0430 on 1/6/2021. All questions were answered at that time. Interventions:   Insulin drip was started, urine osmolality, serum osmolality, urine sodium, and sodium levels were ordered stat.  1 g calcium gluconate was ordered as well. Hypoglycemic orders were placed with insulin protocol and POCT glucose to be checked every 1 hour. Goal titration 140-180. Due to the immediate potential for life-threatening deterioration due to worsening clinical course, I spent 35 minutes providing critical care. This time is excluding time spent performing procedures. Electronically signed by [facility] ClairDO on 1/6/2021 at 3:55 AM    This dictation was created with voice recognition software. While attempts have been made to review the dictation as it is transcribed, on occasion the spoken word can be misinterpreted by the technology leading to omissions or inappropriate words, phrases or sentences.

## 2021-01-06 NOTE — PROGRESS NOTES
pulmonary      SUBJECTIVE:  Intubated on vent     OBJECTIVE    VITALS:  BP (!) 67/34   Pulse 63   Temp (!) 91.2 °F (32.9 °C) (Rectal)   Resp 26   Ht 4' 11.02\" (1.499 m)   Wt 164 lb (74.4 kg)   SpO2 (!) 43%   BMI 33.11 kg/m²   HEAD AND FACE EXAM:  No throat injection, no active exudate,no thrush  NECK EXAM;No JVD, no masses, symmetrical  CHEST EXAM; Expansion equal and symmetrical, no masses  LUNG EXAM; Good breath sounds bilaterally. There are expiratory wheezes both lungs, there are crackles at both lung bases  CARDIOVASCULAR EXAM: Positive S1 and S2, no S3 or S4, no clicks ,no murmurs  RIGHT AND LEFT LOWER EXTRIMITY EXAM: No edema, no swelling, no inflamation            LABS   Lab Results   Component Value Date    WBC 3.8 (L) 01/05/2021    HGB 14.5 01/05/2021    HCT 45.0 01/05/2021    MCV 99.1 01/05/2021     01/05/2021     Lab Results   Component Value Date    CREATININE 0.8 01/06/2021    BUN 10 01/06/2021     (L) 01/06/2021    K 4.1 01/06/2021    CL 90 (L) 01/06/2021    CO2 16 (L) 01/06/2021     Lab Results   Component Value Date    INR 1.34 01/04/2021    PROTIME 16.3 (H) 01/04/2021          Lab Results   Component Value Date    PHOS 3.8 01/06/2021    PHOS 4.2 01/05/2021    PHOS 3.2 01/05/2021    PHOS 3.1 01/05/2021        Recent Labs     01/05/21  1200 01/05/21  1800 01/06/21  0000   PH 7.37 7.27* 7.27*   PO2ART 65* 75 65*   GQS7XPC 32.0 31.0* 33.0   O2SAT 92.0* 93.6* 91.5*         Wt Readings from Last 3 Encounters:   12/29/20 164 lb (74.4 kg)   11/15/19 164 lb (74.4 kg)   11/14/18 165 lb (74.8 kg)               ASSESMENT  acresp failure  S/p cpr        PLAN  1. cpm  2. Dr Jennifer Slade note read and family wishes are dnrcc and care withdrawal  3.  I will sign off.    1/6/2021  Meet Chin M.D.

## 2021-01-06 NOTE — SIGNIFICANT EVENT
Returned to bedside to discuss with family regarding CODE STATUS and withdrawal of care. Discussion was done in conjunction with nurse Elena Son, MAGDIEL. We had a lengthy discussion regarding lab work, current therapy, goals of care, overall poor prognosis and comfort care. All family's questions were answered and ultimately it was decided upon comfort care measures and withdrawal of care. Family requests that all staff including physicians check with nurse prior to entering during the withdrawal of care phase. Note was left on outside of the door. Orders are placed at this time with anticipation of withdrawal of care within the next 2 hours. Family would like time to be with patient prior to this. Family knows that I will be available for any and all questions or to attend to the need during this difficult time.     Electronically signed by Sharlene Adams DO on 1/6/2021 at 5:03 AM

## 2021-01-06 NOTE — PROCEDURES
[address], [address] [facility]                             Routine EEG Report        History: This is a patient presenting to assess for seizure disorder. Technical:  This routine EEG recording was collected digitally and stored in a computer that has seizure detection as well as spike detection software. Report: With the patient intubated, the background did not show any clear occipital dominant rhythm. There was irregular polymorphic theta rhythm in the range of 4-5 Hz. There were frequent single myogenic artifact discharge seen mostly over posterior region. No epileptiform discharges were noted    No sleep pattern was seen. Clinical Event: None reported    Single channel EKG showed irregular rhythm. IMPRESSION:    Abnormal EEG due to moderate range diffuse slowing of the background. This is nonspecific finding and can be seen in variety of encephalopathies.         Signed: Electronically signed by Jyotsna Porter DO

## 2021-01-06 NOTE — DISCHARGE SUMMARY
Discharge Summary    Name:  Leonard Ramachandran /Age/Sex: 1945  (32 y.o. female)   MRN & CSN:  [id number] & [id number] Admission Date/Time: 2020  2:10 AM   Attending:  No att. providers found Discharging Physician: Daylin June MD     Hospital Course:   Leonard Ramachandran is a 77 y.o.  female who presented to the hospital with a complaint of abdominal pain and vomiting. Initial CT abdomen pelvis at the time of admission did not show any acute pathology. Patient subsequently underwent exploratory laparotomy on . Intraoperatively, the patient was noted to have a large bowel obstruction. She underwent 10 cm sigmoid colon resection with anastomosis on . Patient has been clinically doing fairly well throughout the hospital course. Unfortunately, the patient became altered and subsequently had a respiratory arrest leading to intubation in the evening of 21. She subsequent had a cardiac arrest and had 4 rounds of CPR requiring defibrillation x2. Repeat CT abdomen pelvis was done on 21. CT abdomen pelvis demonstrated peritoneal free air and free fluid. Patient was subsequently taken to the OR again  On 21 and underwent exploratory laparotomy with ABDOMINAL WASHOUT, RESECTION OF ANASTOMOSIS, COLOSTOMY CREATION. Per operative report, patient was noted to have a leakage of the anastomosis. Liquid stool was noted. She was noted to be acidotic with pH of 7.19 with lactic acidosis of 7.3 and normal bicarb most recently. She was initiated on 4 pressors and remained hypotensive. .  She is not requiring any sedation and remained unresponsive. Clinical status deteriorated continuously and the decision was made to withdraw care overnight in discussion with the night hospitalist.  Ultimately, care was withdrawn early in the morning of  and the patient passed away at 6:50 AM on 20.    I did not pronounce the patient dead.      #.  Cardiac arrest & acute hypoxic respiratory failure ---s/p 4 rounds of CPR. Critically ill. On 4 pressors. Lactic acid 7.3. Arterial pH 7.19. She did not require any sedation while on the ventilator. There was no neurological function. The event that led to the cardiac arrest is likely anastomosis leak. #.  Large bowel perforation----as result of sigmoid anastomosis leak. #.  Cardiogenic shock----in the setting of cardiac arrest.  Treated with 4 pressors and remained hypotensive. #.  Abdominal pain---with leukocytosis. Initially underwent exlaparotomy with sigmoid colon resection and primary anastomosis on 12/30. Clinically decompensated on the evening of 1/4/21 and subsequently taken to the OR for abdominal washout, resection of anastomosis and colostomy creation. Per operative report, patient noted to have anastomosis leakage and liquid stool was found.     #. Sjogren's syndrome---on methotrexate and Plaquenil, on hold     #. Hypertension---on lisinopril on head CTZ at home.     #.  Obesity---weight 164 pounds, BMI 33    The patient expressed appropriate understanding of and agreement with the discharge recommendations, medications, and plan. Consults this admission:  IP CONSULT TO GENERAL SURGERY  IP CONSULT TO HOSPITALIST  IP CONSULT TO GI  IP CONSULT TO GI  IP CONSULT TO IV TEAM  IP CONSULT TO PULMONOLOGY  IP CONSULT TO PHARMACY  IP CONSULT TO GENERAL SURGERY  IP CONSULT TO CARDIOLOGY  IP CONSULT TO DIETITIAN  IP CONSULT TO NEPHROLOGY  IP CONSULT TO NEPHROLOGY  IP CONSULT TO INTERVENTIONAL RADIOLOGY  IP CONSULT TO NEUROLOGY  IP CONSULT TO [address] [place] [address]    Discharge Instruction:   Patient passed away at 6:50 AM on 1/6/20.     Discharge Medications:     Electronically signed by Brando Ha MD on 1/12/2021 at 12:23 PM

## 2021-01-07 LAB
CULTURE: NORMAL
Lab: NORMAL
SPECIMEN: NORMAL

## 2021-01-10 LAB
CULTURE: ABNORMAL
CULTURE: ABNORMAL
DRAW SOURCE: ABNORMAL
Lab: ABNORMAL
SPECIMEN: ABNORMAL

## 2021-01-11 LAB
CULTURE: NORMAL
DRAW SOURCE: NORMAL
Lab: NORMAL
SPECIMEN: NORMAL
